# Patient Record
Sex: FEMALE | Race: WHITE | NOT HISPANIC OR LATINO | Employment: FULL TIME | URBAN - METROPOLITAN AREA
[De-identification: names, ages, dates, MRNs, and addresses within clinical notes are randomized per-mention and may not be internally consistent; named-entity substitution may affect disease eponyms.]

---

## 2017-02-18 ENCOUNTER — APPOINTMENT (OUTPATIENT)
Dept: LAB | Facility: HOSPITAL | Age: 22
End: 2017-02-18
Payer: COMMERCIAL

## 2017-02-18 ENCOUNTER — OFFICE VISIT (OUTPATIENT)
Dept: URGENT CARE | Facility: MEDICAL CENTER | Age: 22
End: 2017-02-18
Payer: COMMERCIAL

## 2017-02-18 DIAGNOSIS — R69 ILLNESS: ICD-10-CM

## 2017-02-18 PROCEDURE — 99203 OFFICE O/P NEW LOW 30 MIN: CPT

## 2017-02-18 PROCEDURE — 87798 DETECT AGENT NOS DNA AMP: CPT

## 2017-02-19 LAB
FLUAV AG SPEC QL: NORMAL
FLUBV AG SPEC QL: NORMAL
RSV B RNA SPEC QL NAA+PROBE: NORMAL

## 2018-07-12 ENCOUNTER — OFFICE VISIT (OUTPATIENT)
Dept: OBGYN CLINIC | Facility: MEDICAL CENTER | Age: 23
End: 2018-07-12
Payer: COMMERCIAL

## 2018-07-12 VITALS
BODY MASS INDEX: 22.99 KG/M2 | HEIGHT: 65 IN | DIASTOLIC BLOOD PRESSURE: 70 MMHG | SYSTOLIC BLOOD PRESSURE: 110 MMHG | WEIGHT: 138 LBS

## 2018-07-12 DIAGNOSIS — Z01.419 ENCOUNTER FOR GYNECOLOGICAL EXAMINATION WITHOUT ABNORMAL FINDING: Primary | ICD-10-CM

## 2018-07-12 DIAGNOSIS — Z12.4 PAP SMEAR FOR CERVICAL CANCER SCREENING: ICD-10-CM

## 2018-07-12 PROBLEM — J11.1 INFLUENZA-LIKE ILLNESS: Status: ACTIVE | Noted: 2017-02-18

## 2018-07-12 PROCEDURE — 99385 PREV VISIT NEW AGE 18-39: CPT | Performed by: NURSE PRACTITIONER

## 2018-07-12 PROCEDURE — G0145 SCR C/V CYTO,THINLAYER,RESCR: HCPCS | Performed by: NURSE PRACTITIONER

## 2018-07-12 NOTE — PROGRESS NOTES
[facility]  ANNUAL GYNECOLOGIC EXAM  Kiko Navarro 21 y o  SUBJECTIVE      HPI:  Kiko Navarro (pronounced "Ya Khan") is a 21 y o  [de-identified] female presenting today for annual GYN exam  Her last gynecologic exam was in  at our practice  Patient's last menstrual period was 2018  Periods are regular, Q30, x7 days  Sexually active with 1 Male partner, monogamous  Just got  on ! Declines STI testing today  Denies sexual concerns  Has no breast, bowel, bladder, or vaginal concerns  Works at [employer]  Gynecologic History   Last pap smear: Never  History of abnormal pap smears: N/A  The patient denies history of sexually transmitted disease  Contraceptive method: Condoms  Dyspareunia: denies      Obstetric History   0  Desires conception in the next year: Possibly    Health Maintenance  Self-breast exams: yes  Tobacco cessation: N/A      The following portions of the patient's history were reviewed and updated as appropriate: allergies, current medications, past family history, past medical history, past social history, past surgical history and problem list     At todays visit I discussed the importance of self-breast exams and awareness  We discussed the importance of exercise and healthy diet  I reviewed ASCCP guidelines for cervical cancer screening  Safe sexual practices were strongly encouraged to protect against sexually transmitted infections  We reviewed her reproductive life plan  All questions were answered to her apparent satisfaction       ROS  General ROS: negative for chills or fever  Breast ROS: negative for breast lumps  Respiratory ROS: no cough, shortness of breath, or wheezing  Cardiovascular ROS: no chest pain or dyspnea on exertion  Gastrointestinal ROS: no abdominal pain, change in bowel habits, or black or bloody stools  Genito-Urinary ROS: no dysuria, trouble voiding, or hematuria  Neurological ROS: negative      OBJECTIVE  Vitals:    07/12/18 1627   BP: 110/70   BP Location: Left arm   Patient Position: Sitting   Weight: 62 6 kg (138 lb)   Height: 5' 5" (1 651 m)       Physical Exam   Constitutional: She is oriented to person, place, and time  Vital signs are normal  She appears well-developed and well-nourished  Genitourinary: Vagina normal and uterus normal  Pelvic exam was performed with patient supine  There is no rash, tenderness or lesion on the right labia  There is no rash, tenderness or lesion on the left labia  No erythema in the vagina  No vaginal discharge found  Right adnexum does not display mass and does not display tenderness  Left adnexum does not display mass and does not display tenderness  Cervix does not exhibit motion tenderness  Uterus is not tender  HENT:   Head: Normocephalic and atraumatic  Pulmonary/Chest: Effort normal  Right breast exhibits no inverted nipple, no mass, no nipple discharge, no skin change and no tenderness  Left breast exhibits no inverted nipple, no mass, no nipple discharge, no skin change and no tenderness  Breasts are symmetrical    Abdominal: Soft  Normal appearance  Musculoskeletal: Normal range of motion  Lymphadenopathy:     She has no axillary adenopathy  Neurological: She is alert and oriented to person, place, and time  Skin: Skin is warm, dry and intact  Psychiatric: She has a normal mood and affect  Her behavior is normal    Vitals reviewed  ASSESSMENT  Normal exam      PLAN  1 - She is OK with condoms for now  Advised to start prenatal vitamin, they want kids soon but not sure when  2 - First pap smear with reflex HPV (if ASCUS) done today  3 - Return annually for routine GYN exams      All questions were answered & [place] expressed understanding        Rubio Leblanc

## 2018-07-20 LAB
LAB AP GYN PRIMARY INTERPRETATION: NORMAL
LAB AP LMP: NORMAL
Lab: NORMAL

## 2019-04-17 ENCOUNTER — TRANSCRIBE ORDERS (OUTPATIENT)
Dept: LAB | Facility: CLINIC | Age: 24
End: 2019-04-17

## 2019-04-17 ENCOUNTER — TELEPHONE (OUTPATIENT)
Dept: OBGYN CLINIC | Facility: CLINIC | Age: 24
End: 2019-04-17

## 2019-04-17 ENCOUNTER — APPOINTMENT (OUTPATIENT)
Dept: LAB | Facility: CLINIC | Age: 24
End: 2019-04-17
Payer: COMMERCIAL

## 2019-04-17 DIAGNOSIS — N30.00 ACUTE CYSTITIS WITHOUT HEMATURIA: Primary | ICD-10-CM

## 2019-04-17 DIAGNOSIS — N30.00 ACUTE CYSTITIS WITHOUT HEMATURIA: ICD-10-CM

## 2019-04-17 LAB
BACTERIA UR QL AUTO: ABNORMAL /HPF
BILIRUB UR QL STRIP: NEGATIVE
CLARITY UR: ABNORMAL
COLOR UR: YELLOW
GLUCOSE UR STRIP-MCNC: NEGATIVE MG/DL
HGB UR QL STRIP.AUTO: ABNORMAL
KETONES UR STRIP-MCNC: NEGATIVE MG/DL
LEUKOCYTE ESTERASE UR QL STRIP: ABNORMAL
NITRITE UR QL STRIP: NEGATIVE
NON-SQ EPI CELLS URNS QL MICRO: ABNORMAL /HPF
PH UR STRIP.AUTO: 6 [PH]
PROT UR STRIP-MCNC: NEGATIVE MG/DL
RBC #/AREA URNS AUTO: ABNORMAL /HPF
SP GR UR STRIP.AUTO: 1.02 (ref 1–1.03)
UROBILINOGEN UR QL STRIP.AUTO: 0.2 E.U./DL
WBC #/AREA URNS AUTO: ABNORMAL /HPF

## 2019-04-17 PROCEDURE — 87086 URINE CULTURE/COLONY COUNT: CPT

## 2019-04-17 PROCEDURE — 87186 SC STD MICRODIL/AGAR DIL: CPT

## 2019-04-17 PROCEDURE — 81001 URINALYSIS AUTO W/SCOPE: CPT

## 2019-04-17 PROCEDURE — 87077 CULTURE AEROBIC IDENTIFY: CPT

## 2019-04-17 RX ORDER — NITROFURANTOIN 25; 75 MG/1; MG/1
100 CAPSULE ORAL 2 TIMES DAILY
Qty: 6 CAPSULE | Refills: 0 | Status: SHIPPED | OUTPATIENT
Start: 2019-04-17 | End: 2019-04-20

## 2019-04-19 ENCOUNTER — TELEPHONE (OUTPATIENT)
Dept: OBGYN CLINIC | Facility: CLINIC | Age: 24
End: 2019-04-19

## 2019-04-19 LAB
BACTERIA UR CULT: ABNORMAL
BACTERIA UR CULT: ABNORMAL

## 2019-09-03 ENCOUNTER — OFFICE VISIT (OUTPATIENT)
Dept: URGENT CARE | Facility: MEDICAL CENTER | Age: 24
End: 2019-09-03
Payer: COMMERCIAL

## 2019-09-03 VITALS
TEMPERATURE: 98.6 F | DIASTOLIC BLOOD PRESSURE: 72 MMHG | RESPIRATION RATE: 20 BRPM | SYSTOLIC BLOOD PRESSURE: 105 MMHG | HEART RATE: 100 BPM | HEIGHT: 65 IN | WEIGHT: 164.38 LBS | OXYGEN SATURATION: 76 % | BODY MASS INDEX: 27.39 KG/M2

## 2019-09-03 DIAGNOSIS — J02.9 SORE THROAT: Primary | ICD-10-CM

## 2019-09-03 LAB — S PYO AG THROAT QL: NEGATIVE

## 2019-09-03 PROCEDURE — 99213 OFFICE O/P EST LOW 20 MIN: CPT | Performed by: PHYSICIAN ASSISTANT

## 2019-09-03 PROCEDURE — 87070 CULTURE OTHR SPECIMN AEROBIC: CPT | Performed by: PHYSICIAN ASSISTANT

## 2019-09-03 PROCEDURE — 87880 STREP A ASSAY W/OPTIC: CPT | Performed by: PHYSICIAN ASSISTANT

## 2019-09-03 NOTE — PATIENT INSTRUCTIONS
Please use Tylenol or Motrin for pain   may continue to use warm salt water gargles and drink hot fluids   follow-up with PCP if symptoms do not improve  Report to ER if symptoms worsen    Pharyngitis   WHAT YOU NEED TO KNOW:   Pharyngitis, or sore throat, is inflammation of the tissues and structures in your pharynx (throat)  Pharyngitis is most often caused by bacteria  It may also be caused by a cold or flu virus  Other causes include smoking, allergies, or acid reflux  DISCHARGE INSTRUCTIONS:   Call 911 for any of the following:   · You have trouble breathing or swallowing because your throat is swollen or sore  Return to the emergency department if:   · You are drooling because it hurts too much to swallow  · Your fever is higher than 102? F (39?C) or lasts longer than 3 days  · You are confused  · You taste blood in your throat  Contact your healthcare provider if:   · Your throat pain gets worse  · You have a painful lump in your throat that does not go away after 5 days  · Your symptoms do not improve after 5 days  · You have questions or concerns about your condition or care  Medicines:  Viral pharyngitis will go away on its own without treatment  Your sore throat should start to feel better in 3 to 5 days for both viral and bacterial infections  You may need any of the following:  · Antibiotics  treat a bacterial infection  · NSAIDs , such as ibuprofen, help decrease swelling, pain, and fever  NSAIDs can cause stomach bleeding or kidney problems in certain people  If you take blood thinner medicine, always ask your healthcare provider if NSAIDs are safe for you  Always read the medicine label and follow directions  · Acetaminophen  decreases pain and fever  It is available without a doctor's order  Ask how much to take and how often to take it  Follow directions  Acetaminophen can cause liver damage if not taken correctly  · Take your medicine as directed    Contact your healthcare provider if you think your medicine is not helping or if you have side effects  Tell him or her if you are allergic to any medicine  Keep a list of the medicines, vitamins, and herbs you take  Include the amounts, and when and why you take them  Bring the list or the pill bottles to follow-up visits  Carry your medicine list with you in case of an emergency  Manage your symptoms:   · Gargle salt water  Mix ¼ teaspoon salt in an 8 ounce glass of warm water and gargle  This may help decrease swelling in your throat  · Drink liquids as directed  You may need to drink more liquids than usual  Liquids may help soothe your throat and prevent dehydration  Ask how much liquid to drink each day and which liquids are best for you  · Use a cool-steam humidifier  to help moisten the air in your room and calm your cough  · Soothe your throat  with cough drops, ice, soft foods, or popsicles  Prevent the spread of pharyngitis:  Cover your mouth and nose when you cough or sneeze  Do not share food or drinks  Wash your hands often  Use soap and water  If soap and water are unavailable, use an alcohol based hand   Follow up with your healthcare provider as directed:  Write down your questions so you remember to ask them during your visits  © 2017 [address] Information is for End User's use only and may not be sold, redistributed or otherwise used for commercial purposes  All illustrations and images included in CareNotes® are the copyrighted property of A D A M , Inc  or Nicholas Owens  The above information is an  only  It is not intended as medical advice for individual conditions or treatments  Talk to your doctor, nurse or pharmacist before following any medical regimen to see if it is safe and effective for you

## 2019-09-05 LAB — BACTERIA THROAT CULT: NORMAL

## 2021-06-21 ENCOUNTER — ANNUAL EXAM (OUTPATIENT)
Dept: OBGYN CLINIC | Facility: MEDICAL CENTER | Age: 26
End: 2021-06-21
Payer: COMMERCIAL

## 2021-06-21 VITALS — DIASTOLIC BLOOD PRESSURE: 62 MMHG | WEIGHT: 178 LBS | BODY MASS INDEX: 29.62 KG/M2 | SYSTOLIC BLOOD PRESSURE: 96 MMHG

## 2021-06-21 DIAGNOSIS — Z01.419 ENCOUNTER FOR GYNECOLOGICAL EXAMINATION (GENERAL) (ROUTINE) WITHOUT ABNORMAL FINDINGS: Primary | ICD-10-CM

## 2021-06-21 PROCEDURE — S0612 ANNUAL GYNECOLOGICAL EXAMINA: HCPCS | Performed by: NURSE PRACTITIONER

## 2021-06-21 PROCEDURE — G0145 SCR C/V CYTO,THINLAYER,RESCR: HCPCS | Performed by: NURSE PRACTITIONER

## 2021-06-21 NOTE — ASSESSMENT & PLAN NOTE
Normal findings on routine annual gyn exam  Recommended monthly SBE, annual CBE  Reviewed ASCCP guidelines and pap collected today  The patient reports she has NOT completed Gardasil series-declines at this time  STI testing was offered and declined at this time; the patient is aware that condoms are recommended for all sexual contact for prevention of STI  Reviewed diet/activity recommendations and reasons to call  F/u in one year for routine annual gyn exam or sooner PRN

## 2021-06-21 NOTE — PROGRESS NOTES
Encounter for gynecological examination (general) (routine) without abnormal findings  Normal findings on routine annual gyn exam  Recommended monthly SBE, annual CBE  Reviewed ASCCP guidelines and pap collected today  The patient reports she has NOT completed Gardasil series-declines at this time  STI testing was offered and declined at this time; the patient is aware that condoms are recommended for all sexual contact for prevention of STI  Reviewed diet/activity recommendations and reasons to call  F/u in one year for routine annual gyn exam or sooner PRN  Diagnoses and all orders for this visit:    Encounter for gynecological examination (general) (routine) without abnormal findings  -     Liquid-based pap, screening         Nathaly Grayson  1995    CC:  Yearly exam    S:  Nathaly Grayson is a 32 y o  female here for yearly exam  She denies breast concerns, abdominal/pelvic pain, abnormal vaginal discharge or bladder/bowel dysfunction  Her cycles are regular, not heavy or painful  Sexual activity: She is sexually active without pain, bleeding or dryness  Contraception:  She uses nothing  for contraception  They are attempting pregnancy in the next few months  Advised PN vitamin with DHA to start now  Discussed timing and frequency of intercourse  STD testing:  She does not request STD testing today  Gardasil:  She has had the Gardasil series  Last Pap:7/18 negative     Family hx of breast cancer: denies  Family hx of ovarian cancer: denies  Family hx of colon cancer: denies     She works for [employer]    No current outpatient medications on file    Social History     Socioeconomic History    Marital status: /Civil Union     Spouse name: Not on file    Number of children: Not on file    Years of education: Not on file    Highest education level: Not on file   Occupational History    Not on file   Tobacco Use    Smoking status: Current Every Day Smoker  Smokeless tobacco: Never Used   Substance and Sexual Activity    Alcohol use: Not Currently    Drug use: No    Sexual activity: Yes     Partners: Male   Other Topics Concern    Not on file   Social History Narrative    Not on file     Social Determinants of Health     Financial Resource Strain:     Difficulty of Paying Living Expenses:    Food Insecurity:     Worried About Running Out of Food in the Last Year:     [address] in the Last Year:    Transportation Needs:     Lack of Transportation (Medical):  Lack of Transportation (Non-Medical):    Physical Activity:     Days of Exercise per Week:     Minutes of Exercise per Session:    Stress:     Feeling of Stress :    Social Connections:     Frequency of Communication with Friends and Family:     Frequency of Social Gatherings with Friends and Family:     Attends Congregation Services:     Active Member of Clubs or Organizations:     Attends Club or Organization Meetings:     Marital Status:    Intimate Partner Violence:     Fear of Current or Ex-Partner:     Emotionally Abused:     Physically Abused:     Sexually Abused:      Family History   Problem Relation Age of Onset    No Known Problems Mother     Diabetes Father     Bone cancer Paternal Grandfather      Past Medical History:   Diagnosis Date    Mononucleosis     Pleurisy      Review of Systems   Constitutional: Negative for appetite change, fatigue and unexpected weight change  Respiratory: Negative for shortness of breath  Cardiovascular: Negative for chest pain and leg swelling  Breasts:  negative for tenderness or masses  Gastrointestinal: Negative for abdominal pain  Endocrine: Negative for cold intolerance and heat intolerance  Genitourinary: Negative for dyspareunia, dysuria, flank pain, frequency, genital sores, hematuria, menstrual problem and pelvic pain  Musculoskeletal: Negative for back pain  Neurological: Negative for headaches       O:  Blood pressure 96/62, weight 80 7 kg (178 lb), last menstrual period 05/28/2021, not currently breastfeeding  Patient appears well and is not in distress  ROM normal   Neck is supple without masses  Normal thyroid  Heart regular rate and rhythm  Lungs CTA bilaterally   Breasts are symmetrical without mass, tenderness, nipple discharge, skin changes or adenopathy  Abdomen is soft and nontender without masses  External genitals are normal without lesions or rashes  Urethral meatus and urethra are normal  Bladder is normal to palpation  Vagina is normal without discharge or bleeding  Cervix is normal without discharge or lesion  Uterus is normal, mobile, nontender without palpable mass  Adnexa are normal, nontender, without palpable mass     Skin warm and dry  Capillary refill < 2 seconds  Alert and oriented x 3 with normal affect

## 2021-06-25 LAB
LAB AP GYN PRIMARY INTERPRETATION: NORMAL
Lab: NORMAL

## 2021-06-28 ENCOUNTER — TELEPHONE (OUTPATIENT)
Dept: OBGYN CLINIC | Facility: CLINIC | Age: 26
End: 2021-06-28

## 2021-10-26 ENCOUNTER — ULTRASOUND (OUTPATIENT)
Dept: OBGYN CLINIC | Facility: MEDICAL CENTER | Age: 26
End: 2021-10-26
Payer: COMMERCIAL

## 2021-10-26 VITALS
WEIGHT: 189 LBS | BODY MASS INDEX: 31.49 KG/M2 | DIASTOLIC BLOOD PRESSURE: 76 MMHG | HEIGHT: 65 IN | SYSTOLIC BLOOD PRESSURE: 100 MMHG

## 2021-10-26 DIAGNOSIS — N91.1 SECONDARY AMENORRHEA: Primary | ICD-10-CM

## 2021-10-26 PROCEDURE — 76817 TRANSVAGINAL US OBSTETRIC: CPT | Performed by: NURSE PRACTITIONER

## 2021-10-26 PROCEDURE — 99213 OFFICE O/P EST LOW 20 MIN: CPT | Performed by: NURSE PRACTITIONER

## 2021-11-03 ENCOUNTER — TELEPHONE (OUTPATIENT)
Dept: OBGYN CLINIC | Facility: CLINIC | Age: 26
End: 2021-11-03

## 2021-11-05 ENCOUNTER — TELEMEDICINE (OUTPATIENT)
Dept: OBGYN CLINIC | Facility: CLINIC | Age: 26
End: 2021-11-05

## 2021-11-05 VITALS — WEIGHT: 189 LBS | HEIGHT: 65 IN | BODY MASS INDEX: 31.49 KG/M2

## 2021-11-05 DIAGNOSIS — Z34.01 ENCOUNTER FOR SUPERVISION OF NORMAL FIRST PREGNANCY IN FIRST TRIMESTER: Primary | ICD-10-CM

## 2021-11-05 PROCEDURE — OBC: Performed by: NURSE PRACTITIONER

## 2021-11-08 ENCOUNTER — APPOINTMENT (OUTPATIENT)
Dept: LAB | Facility: CLINIC | Age: 26
End: 2021-11-08
Payer: COMMERCIAL

## 2021-11-08 DIAGNOSIS — Z34.01 ENCOUNTER FOR SUPERVISION OF NORMAL FIRST PREGNANCY IN FIRST TRIMESTER: ICD-10-CM

## 2021-11-08 LAB
ABO GROUP BLD: NORMAL
BACTERIA UR QL AUTO: NORMAL /HPF
BASOPHILS # BLD AUTO: 0.03 THOUSANDS/ΜL (ref 0–0.1)
BASOPHILS NFR BLD AUTO: 1 % (ref 0–1)
BILIRUB UR QL STRIP: NEGATIVE
BLD GP AB SCN SERPL QL: NEGATIVE
CLARITY UR: CLEAR
COLOR UR: YELLOW
EOSINOPHIL # BLD AUTO: 0.06 THOUSAND/ΜL (ref 0–0.61)
EOSINOPHIL NFR BLD AUTO: 1 % (ref 0–6)
ERYTHROCYTE [DISTWIDTH] IN BLOOD BY AUTOMATED COUNT: 12.9 % (ref 11.6–15.1)
GLUCOSE UR STRIP-MCNC: NEGATIVE MG/DL
HBV SURFACE AG SER QL: NORMAL
HCT VFR BLD AUTO: 41.1 % (ref 34.8–46.1)
HCV AB SER QL: NORMAL
HGB BLD-MCNC: 14 G/DL (ref 11.5–15.4)
HGB UR QL STRIP.AUTO: NEGATIVE
HYALINE CASTS #/AREA URNS LPF: NORMAL /LPF
IMM GRANULOCYTES # BLD AUTO: 0.01 THOUSAND/UL (ref 0–0.2)
IMM GRANULOCYTES NFR BLD AUTO: 0 % (ref 0–2)
KETONES UR STRIP-MCNC: NEGATIVE MG/DL
LEUKOCYTE ESTERASE UR QL STRIP: NEGATIVE
LYMPHOCYTES # BLD AUTO: 1.64 THOUSANDS/ΜL (ref 0.6–4.47)
LYMPHOCYTES NFR BLD AUTO: 37 % (ref 14–44)
MCH RBC QN AUTO: 32.3 PG (ref 26.8–34.3)
MCHC RBC AUTO-ENTMCNC: 34.1 G/DL (ref 31.4–37.4)
MCV RBC AUTO: 95 FL (ref 82–98)
MONOCYTES # BLD AUTO: 0.42 THOUSAND/ΜL (ref 0.17–1.22)
MONOCYTES NFR BLD AUTO: 10 % (ref 4–12)
NEUTROPHILS # BLD AUTO: 2.26 THOUSANDS/ΜL (ref 1.85–7.62)
NEUTS SEG NFR BLD AUTO: 51 % (ref 43–75)
NITRITE UR QL STRIP: NEGATIVE
NON-SQ EPI CELLS URNS QL MICRO: NORMAL /HPF
NRBC BLD AUTO-RTO: 0 /100 WBCS
PH UR STRIP.AUTO: 7.5 [PH]
PLATELET # BLD AUTO: 197 THOUSANDS/UL (ref 149–390)
PMV BLD AUTO: 12.8 FL (ref 8.9–12.7)
PROT UR STRIP-MCNC: NEGATIVE MG/DL
RBC # BLD AUTO: 4.33 MILLION/UL (ref 3.81–5.12)
RBC #/AREA URNS AUTO: NORMAL /HPF
RH BLD: POSITIVE
RUBV IGG SERPL IA-ACNC: 49.3 IU/ML
SP GR UR STRIP.AUTO: 1.01 (ref 1–1.03)
SPECIMEN EXPIRATION DATE: NORMAL
UROBILINOGEN UR QL STRIP.AUTO: 0.2 E.U./DL
WBC # BLD AUTO: 4.42 THOUSAND/UL (ref 4.31–10.16)
WBC #/AREA URNS AUTO: NORMAL /HPF

## 2021-11-08 PROCEDURE — 87086 URINE CULTURE/COLONY COUNT: CPT

## 2021-11-08 PROCEDURE — 81001 URINALYSIS AUTO W/SCOPE: CPT

## 2021-11-08 PROCEDURE — 36415 COLL VENOUS BLD VENIPUNCTURE: CPT

## 2021-11-08 PROCEDURE — 86803 HEPATITIS C AB TEST: CPT

## 2021-11-08 PROCEDURE — 80081 OBSTETRIC PANEL INC HIV TSTG: CPT

## 2021-11-09 ENCOUNTER — TELEPHONE (OUTPATIENT)
Dept: PERINATAL CARE | Facility: CLINIC | Age: 26
End: 2021-11-09

## 2021-11-09 ENCOUNTER — TELEPHONE (OUTPATIENT)
Dept: PERINATAL CARE | Facility: OTHER | Age: 26
End: 2021-11-09

## 2021-11-09 LAB
BACTERIA UR CULT: NORMAL
HIV 1+2 AB+HIV1 P24 AG SERPL QL IA: NORMAL
RPR SER QL: NORMAL

## 2021-11-18 ENCOUNTER — INITIAL PRENATAL (OUTPATIENT)
Dept: OBGYN CLINIC | Facility: MEDICAL CENTER | Age: 26
End: 2021-11-18
Payer: COMMERCIAL

## 2021-11-18 ENCOUNTER — ROUTINE PRENATAL (OUTPATIENT)
Dept: PERINATAL CARE | Facility: OTHER | Age: 26
End: 2021-11-18
Payer: COMMERCIAL

## 2021-11-18 VITALS
DIASTOLIC BLOOD PRESSURE: 63 MMHG | HEIGHT: 65 IN | WEIGHT: 186 LBS | SYSTOLIC BLOOD PRESSURE: 103 MMHG | HEART RATE: 78 BPM | BODY MASS INDEX: 30.99 KG/M2

## 2021-11-18 VITALS — BODY MASS INDEX: 30.95 KG/M2 | SYSTOLIC BLOOD PRESSURE: 112 MMHG | WEIGHT: 186 LBS | DIASTOLIC BLOOD PRESSURE: 82 MMHG

## 2021-11-18 DIAGNOSIS — Z3A.12 12 WEEKS GESTATION OF PREGNANCY: ICD-10-CM

## 2021-11-18 DIAGNOSIS — O99.210 OBESITY IN PREGNANCY: ICD-10-CM

## 2021-11-18 DIAGNOSIS — Z11.3 SCREENING FOR STD (SEXUALLY TRANSMITTED DISEASE): ICD-10-CM

## 2021-11-18 DIAGNOSIS — Z23 NEED FOR INFLUENZA VACCINATION: ICD-10-CM

## 2021-11-18 DIAGNOSIS — Z34.01 ENCOUNTER FOR SUPERVISION OF NORMAL FIRST PREGNANCY IN FIRST TRIMESTER: Primary | ICD-10-CM

## 2021-11-18 DIAGNOSIS — Z87.898 HISTORY OF SYNCOPE: ICD-10-CM

## 2021-11-18 DIAGNOSIS — Z34.01 ENCOUNTER FOR SUPERVISION OF NORMAL FIRST PREGNANCY IN FIRST TRIMESTER: ICD-10-CM

## 2021-11-18 DIAGNOSIS — Z36.82 ENCOUNTER FOR NUCHAL TRANSLUCENCY TESTING: Primary | ICD-10-CM

## 2021-11-18 PROBLEM — J11.1 INFLUENZA-LIKE ILLNESS: Status: RESOLVED | Noted: 2017-02-18 | Resolved: 2021-11-18

## 2021-11-18 PROBLEM — N91.1 SECONDARY AMENORRHEA: Status: RESOLVED | Noted: 2021-10-26 | Resolved: 2021-11-18

## 2021-11-18 PROBLEM — Z01.419 ENCOUNTER FOR GYNECOLOGICAL EXAMINATION (GENERAL) (ROUTINE) WITHOUT ABNORMAL FINDINGS: Status: RESOLVED | Noted: 2021-06-21 | Resolved: 2021-11-18

## 2021-11-18 LAB
SL AMB  POCT GLUCOSE, UA: NORMAL
SL AMB POCT URINE PROTEIN: NORMAL

## 2021-11-18 PROCEDURE — 99241 PR OFFICE CONSULTATION NEW/ESTAB PATIENT 15 MIN: CPT | Performed by: OBSTETRICS & GYNECOLOGY

## 2021-11-18 PROCEDURE — 76813 OB US NUCHAL MEAS 1 GEST: CPT | Performed by: OBSTETRICS & GYNECOLOGY

## 2021-11-18 PROCEDURE — PNV: Performed by: PHYSICIAN ASSISTANT

## 2021-11-18 PROCEDURE — 87591 N.GONORRHOEAE DNA AMP PROB: CPT | Performed by: PHYSICIAN ASSISTANT

## 2021-11-18 PROCEDURE — 87491 CHLMYD TRACH DNA AMP PROBE: CPT | Performed by: PHYSICIAN ASSISTANT

## 2021-11-18 PROCEDURE — 90686 IIV4 VACC NO PRSV 0.5 ML IM: CPT | Performed by: PHYSICIAN ASSISTANT

## 2021-11-18 PROCEDURE — 90471 IMMUNIZATION ADMIN: CPT | Performed by: PHYSICIAN ASSISTANT

## 2021-11-30 ENCOUNTER — TELEPHONE (OUTPATIENT)
Dept: OBGYN CLINIC | Facility: CLINIC | Age: 26
End: 2021-11-30

## 2021-12-03 LAB
C TRACH DNA SPEC QL NAA+PROBE: NEGATIVE
N GONORRHOEA DNA SPEC QL NAA+PROBE: NEGATIVE

## 2021-12-07 ENCOUNTER — TELEPHONE (OUTPATIENT)
Dept: PERINATAL CARE | Facility: CLINIC | Age: 26
End: 2021-12-07

## 2021-12-14 ENCOUNTER — ROUTINE PRENATAL (OUTPATIENT)
Dept: OBGYN CLINIC | Facility: MEDICAL CENTER | Age: 26
End: 2021-12-14

## 2021-12-14 VITALS — SYSTOLIC BLOOD PRESSURE: 116 MMHG | WEIGHT: 190.2 LBS | DIASTOLIC BLOOD PRESSURE: 66 MMHG | BODY MASS INDEX: 31.65 KG/M2

## 2021-12-14 DIAGNOSIS — Z34.02 ENCOUNTER FOR SUPERVISION OF NORMAL FIRST PREGNANCY IN SECOND TRIMESTER: ICD-10-CM

## 2021-12-14 DIAGNOSIS — O99.210 OBESITY IN PREGNANCY: ICD-10-CM

## 2021-12-14 DIAGNOSIS — Z34.01 ENCOUNTER FOR SUPERVISION OF NORMAL FIRST PREGNANCY IN FIRST TRIMESTER: Primary | ICD-10-CM

## 2021-12-14 LAB
SL AMB  POCT GLUCOSE, UA: NORMAL
SL AMB POCT URINE PROTEIN: NORMAL

## 2021-12-14 PROCEDURE — PNV: Performed by: NURSE PRACTITIONER

## 2021-12-27 ENCOUNTER — TELEPHONE (OUTPATIENT)
Dept: OBGYN CLINIC | Facility: CLINIC | Age: 26
End: 2021-12-27

## 2022-01-06 ENCOUNTER — ROUTINE PRENATAL (OUTPATIENT)
Dept: PERINATAL CARE | Facility: OTHER | Age: 27
End: 2022-01-06
Payer: COMMERCIAL

## 2022-01-06 VITALS
WEIGHT: 192.2 LBS | DIASTOLIC BLOOD PRESSURE: 72 MMHG | BODY MASS INDEX: 32.02 KG/M2 | SYSTOLIC BLOOD PRESSURE: 108 MMHG | HEIGHT: 65 IN | HEART RATE: 78 BPM

## 2022-01-06 DIAGNOSIS — Z3A.20 20 WEEKS GESTATION OF PREGNANCY: ICD-10-CM

## 2022-01-06 DIAGNOSIS — Z36.86 ENCOUNTER FOR ANTENATAL SCREENING FOR CERVICAL LENGTH: ICD-10-CM

## 2022-01-06 DIAGNOSIS — O99.210 OBESITY IN PREGNANCY: Primary | ICD-10-CM

## 2022-01-06 PROCEDURE — 76811 OB US DETAILED SNGL FETUS: CPT | Performed by: OBSTETRICS & GYNECOLOGY

## 2022-01-06 PROCEDURE — 99213 OFFICE O/P EST LOW 20 MIN: CPT | Performed by: OBSTETRICS & GYNECOLOGY

## 2022-01-06 PROCEDURE — 76817 TRANSVAGINAL US OBSTETRIC: CPT | Performed by: OBSTETRICS & GYNECOLOGY

## 2022-01-06 RX ORDER — DEXAMETHASONE 6 MG/1
6 TABLET ORAL DAILY
COMMUNITY
Start: 2021-12-31 | End: 2022-01-18

## 2022-01-06 RX ORDER — AZITHROMYCIN 500 MG/1
500 TABLET, FILM COATED ORAL DAILY
COMMUNITY
Start: 2021-12-31 | End: 2022-01-18

## 2022-01-11 PROBLEM — Z3A.20 20 WEEKS GESTATION OF PREGNANCY: Status: ACTIVE | Noted: 2022-01-11

## 2022-01-11 NOTE — PROGRESS NOTES
The patient was seen today for an ultrasound  Please see ultrasound report (located under Ob Procedures) for additional details  Thank you very much for allowing us to participate in the care of this very nice patient  Should you have any questions, please do not hesitate to contact me  Amor Bates MD [facility]  Attending Physician, [place]

## 2022-01-18 ENCOUNTER — ROUTINE PRENATAL (OUTPATIENT)
Dept: OBGYN CLINIC | Facility: MEDICAL CENTER | Age: 27
End: 2022-01-18

## 2022-01-18 VITALS — SYSTOLIC BLOOD PRESSURE: 110 MMHG | BODY MASS INDEX: 32.98 KG/M2 | DIASTOLIC BLOOD PRESSURE: 72 MMHG | WEIGHT: 198.2 LBS

## 2022-01-18 DIAGNOSIS — O98.512 COVID-19 AFFECTING PREGNANCY IN SECOND TRIMESTER: ICD-10-CM

## 2022-01-18 DIAGNOSIS — O99.210 OBESITY IN PREGNANCY: ICD-10-CM

## 2022-01-18 DIAGNOSIS — Z3A.21 21 WEEKS GESTATION OF PREGNANCY: ICD-10-CM

## 2022-01-18 DIAGNOSIS — U07.1 COVID-19 AFFECTING PREGNANCY IN SECOND TRIMESTER: ICD-10-CM

## 2022-01-18 DIAGNOSIS — Z34.92 PRENATAL CARE IN SECOND TRIMESTER: Primary | ICD-10-CM

## 2022-01-18 LAB
SL AMB  POCT GLUCOSE, UA: NORMAL
SL AMB POCT URINE PROTEIN: NORMAL

## 2022-01-18 PROCEDURE — PNV: Performed by: NURSE PRACTITIONER

## 2022-01-18 NOTE — PROGRESS NOTES
Prenatal care in second trimester    Jong Brown  is a 32 y o   @21w4d who presents for routine prenatal visit  Denies OB complaints  No fetal movement yet-anterior placenta  Denies contractions, cramping, leakage of fluid or vaginal bleeding  Declines COVID vaccine  1/6 normal anatomy scan  Having a girl  Declines all genetic testing and MSAFP  For growth @ 32 weeks  S/p flu vaccine  Reviewed reasons to call  Obesity in pregnancy  Walking encouraged  TWG=9#    COVID-19 affecting pregnancy in second trimester  + COVID test @ 18 weeks  Third trimester growth scan    Added to list    21 weeks gestation of pregnancy  For routine PN visit

## 2022-01-18 NOTE — PROGRESS NOTES
Patient here for PN visit  She denies any complaints  US done 1/6  It's a girl! She does not feel movement yet  Urine neg for protein and glucose  She wants to try to breast feed

## 2022-01-18 NOTE — PATIENT INSTRUCTIONS
Pregnancy at [address] to [address]:   What changes are happening with my body? Now that you are in your second trimester, you have more energy  You may also be feeling hungrier than usual  You may be gaining about ½ to 1 pound a week, and your pregnancy is beginning to show  You may need to start wearing maternity clothes  As your baby gets larger, you may have other symptoms  These may include body aches or stretch marks on your abdomen, breasts, thighs, or buttocks  How do I care for myself at this stage of my pregnancy? · Eat a variety of healthy foods  Healthy foods include fruits, vegetables, whole-grain breads, low-fat dairy foods, beans, lean meats, and fish  Drink liquids as directed  Ask how much liquid to drink each day and which liquids are best for you  Limit caffeine to less than 200 milligrams each day  Limit your intake of fish to 2 servings each week  Choose fish low in mercury such as canned light tuna, shrimp, salmon, cod, or tilapia  Do not  eat fish high in mercury such as swordfish, tilefish, tamiko mackerel, and shark  · Take prenatal vitamins as directed  Your need for certain vitamins and minerals, such as folic acid, increases during pregnancy  Prenatal vitamins provide some of the extra vitamins and minerals you need  Prenatal vitamins may also help to decrease the risk of certain birth defects  · Talk to your healthcare provider about exercise  Moderate exercise can help you stay fit  Your healthcare provider will help you plan an exercise program that is safe for you during pregnancy  · Do not smoke  Smoking increases your risk of a miscarriage and other health problems during your pregnancy  Smoking can cause your baby to be born too early or weigh less at birth  Ask your healthcare provider for information if you need help quitting  · Do not drink alcohol  Alcohol passes from your body to your baby through the placenta   It can affect your baby's brain development and cause fetal alcohol syndrome (FAS)  FAS is a group of conditions that causes mental, behavior, and growth problems  · Talk to your healthcare provider before you take any medicines  Many medicines may harm your baby if you take them when you are pregnant  Do not take any medicines, vitamins, herbs, or supplements without first talking to your healthcare provider  Never use illegal or street drugs (such as marijuana or cocaine) while you are pregnant  What are some safety tips during pregnancy? · Avoid hot tubs and saunas  Do not use a hot tub or sauna while you are pregnant, especially during your first trimester  Hot tubs and saunas may raise your baby's temperature and increase the risk of birth defects  · Avoid toxoplasmosis  This is an infection caused by eating raw meat or being around infected cat feces  It can cause birth defects, miscarriages, and other problems  Wash your hands after you touch raw meat  Make sure any meat is well-cooked before you eat it  Avoid raw eggs and unpasteurized milk  Use gloves or ask someone else to clean your cat's litter box while you are pregnant  What changes are happening with my baby? By 22 weeks, your baby is about 8 inches long from the top of the head to the rump (baby's bottom)  Your baby also weighs about 1 pound  Your baby is becoming much more active  You may be able to feel the baby move inside you now  The first movements may not be that noticeable  They may feel like a fluttering sensation  As time goes on, your baby's movements will become stronger and more noticeable  What do I need to know about prenatal care? During the first 28 weeks of your pregnancy, you will see your healthcare provider once a month  Your healthcare provider will check your blood pressure and weight  You may also need the following:  · A urine test  may also be done to check for sugar and protein   These can be signs of gestational diabetes or infection  Protein in your urine may also be a sign of preeclampsia  Preeclampsia is a condition that can develop during week 20 or later of your pregnancy  It causes high blood pressure, and it can cause problems with your kidneys and other organs  · Fundal height  is a measurement of your uterus to check your baby's growth  This number is usually the same as the number of weeks that you have been pregnant  · A fetal ultrasound  shows pictures of your baby inside your uterus  It shows your baby's development  The movement and position of your baby can also be seen  Your healthcare provider may be able to tell you what your baby's gender is during the ultrasound  · Your baby's heart rate  will be checked  When should I seek immediate care? · You develop a severe headache that does not go away  · You have new or increased vision changes, such as blurred or spotted vision  · You have new or increased swelling in your face or hands  · You have vaginal spotting or bleeding  · Your water broke or you feel warm water gushing or trickling from your vagina  When should I call my doctor or obstetrician? · You have abdominal cramps, pressure, or tightening  · You have a change in vaginal discharge  · You cannot keep food or drinks down, and you are losing weight  · You have chills or a fever  · You have vaginal itching, burning, or pain  · You have yellow, green, white, or foul-smelling vaginal discharge  · You have pain or burning when you urinate, less urine than usual, or pink or bloody urine  · You have questions or concerns about your condition or care  CARE AGREEMENT:   You have the right to help plan your care  Learn about your health condition and how it may be treated  Discuss treatment options with your healthcare providers to decide what care you want to receive  You always have the right to refuse treatment   The above information is an  only  It is not intended as medical advice for individual conditions or treatments  Talk to your doctor, nurse or pharmacist before following any medical regimen to see if it is safe and effective for you  © Copyright [facility] 2021 Information is for End User's use only and may not be sold, redistributed or otherwise used for commercial purposes   All illustrations and images included in CareNotes® are the copyrighted property of [employer] , Inc  or [address] [place] [address]

## 2022-01-18 NOTE — ASSESSMENT & PLAN NOTE
Hattie Tuttlekathy  is a 32 y o  Luis Eduardomie Keisha @21w4d who presents for routine prenatal visit  Denies OB complaints  No fetal movement yet-anterior placenta  Denies contractions, cramping, leakage of fluid or vaginal bleeding  Declines COVID vaccine  1/6 normal anatomy scan  Having a girl  Declines all genetic testing and MSAFP  For growth @ 32 weeks  S/p flu vaccine  Reviewed reasons to call

## 2022-01-21 ENCOUNTER — TELEPHONE (OUTPATIENT)
Dept: PERINATAL CARE | Facility: OTHER | Age: 27
End: 2022-01-21

## 2022-01-21 NOTE — TELEPHONE ENCOUNTER
Left patient a message that her MFM appointment had to be rescheduled  The new time, date and location were provided - 1/28/22  3:15  [place]  The patient has been instructed to please call us back at [phone] with any questions or concerns

## 2022-01-26 ENCOUNTER — TELEPHONE (OUTPATIENT)
Dept: PERINATAL CARE | Facility: OTHER | Age: 27
End: 2022-01-26

## 2022-01-26 NOTE — TELEPHONE ENCOUNTER
Left message for pt stating that I was canceling her ultrasound appt on this Friday 1/28 in [place], She did have her level 2 on 1/6 and needs to make a 12w followup for growth around 4/20

## 2022-02-14 ENCOUNTER — ROUTINE PRENATAL (OUTPATIENT)
Dept: OBGYN CLINIC | Facility: MEDICAL CENTER | Age: 27
End: 2022-02-14
Payer: COMMERCIAL

## 2022-02-14 VITALS — WEIGHT: 203.8 LBS | SYSTOLIC BLOOD PRESSURE: 100 MMHG | DIASTOLIC BLOOD PRESSURE: 72 MMHG | BODY MASS INDEX: 33.91 KG/M2

## 2022-02-14 DIAGNOSIS — O98.512 COVID-19 AFFECTING PREGNANCY IN SECOND TRIMESTER: ICD-10-CM

## 2022-02-14 DIAGNOSIS — M54.9 BACK PAIN AFFECTING PREGNANCY IN SECOND TRIMESTER: ICD-10-CM

## 2022-02-14 DIAGNOSIS — Z3A.25 25 WEEKS GESTATION OF PREGNANCY: ICD-10-CM

## 2022-02-14 DIAGNOSIS — O99.891 BACK PAIN AFFECTING PREGNANCY IN SECOND TRIMESTER: ICD-10-CM

## 2022-02-14 DIAGNOSIS — O99.210 OBESITY IN PREGNANCY: ICD-10-CM

## 2022-02-14 DIAGNOSIS — Z34.92 PRENATAL CARE IN SECOND TRIMESTER: Primary | ICD-10-CM

## 2022-02-14 DIAGNOSIS — U07.1 COVID-19 AFFECTING PREGNANCY IN SECOND TRIMESTER: ICD-10-CM

## 2022-02-14 LAB
SL AMB  POCT GLUCOSE, UA: NORMAL
SL AMB POCT URINE PROTEIN: NORMAL

## 2022-02-14 PROCEDURE — 81002 URINALYSIS NONAUTO W/O SCOPE: CPT | Performed by: NURSE PRACTITIONER

## 2022-02-14 PROCEDURE — PNV: Performed by: NURSE PRACTITIONER

## 2022-02-14 NOTE — PATIENT INSTRUCTIONS
Pregnancy at 23 to 26 [address]:   What changes are happening in my body? You are now close to or at the beginning of the third trimester  The third trimester starts at 24 weeks and ends with delivery  As your baby gets larger, you may develop certain symptoms  These may include pain in your back or down the sides of your abdomen  You may also have stretch marks on your abdomen, breasts, thighs, or buttocks  You may also have constipation  How do I care for myself at this stage of my pregnancy? · Eat a variety of healthy foods  Healthy foods include fruits, vegetables, whole-grain breads, low-fat dairy foods, beans, lean meats, and fish  Drink liquids as directed  Ask how much liquid to drink each day and which liquids are best for you  Limit caffeine to less than 200 milligrams each day  Limit your intake of fish to 2 servings each week  Choose fish low in mercury such as canned light tuna, shrimp, salmon, cod, or tilapia  Do not  eat fish high in mercury such as swordfish, tilefish, tamiko mackerel, and shark  · Manage back pain  Do not stand for long periods of time or lift heavy items  Use good posture while you stand, squat, or bend  Wear low-heeled shoes with good support  Rest may also help to relieve back pain  Ask your healthcare provider about exercises you can do to strengthen your back muscles  · Take prenatal vitamins as directed  Your need for certain vitamins and minerals, such as folic acid, increases during pregnancy  Prenatal vitamins provide some of the extra vitamins and minerals you need  Prenatal vitamins may also help to decrease the risk of certain birth defects  · Talk to your healthcare provider about exercise  Moderate exercise can help you stay fit  Your healthcare provider will help you plan an exercise program that is safe for you during pregnancy  · Do not smoke    Smoking increases your risk of a miscarriage and other health problems during your pregnancy  Smoking can cause your baby to be born too early or weigh less at birth  Ask your healthcare provider for information if you need help quitting  · Do not drink alcohol  Alcohol passes from your body to your baby through the placenta  It can affect your baby's brain development and cause fetal alcohol syndrome (FAS)  FAS is a group of conditions that causes mental, behavior, and growth problems  · Talk to your healthcare provider before you take any medicines  Many medicines may harm your baby if you take them when you are pregnant  Do not take any medicines, vitamins, herbs, or supplements without first talking to your healthcare provider  Never use illegal or street drugs (such as marijuana or cocaine) while you are pregnant  What are some safety tips during pregnancy? · Avoid hot tubs and saunas  Do not use a hot tub or sauna while you are pregnant, especially during your first trimester  Hot tubs and saunas may raise your baby's temperature and increase the risk of birth defects  · Avoid toxoplasmosis  This is an infection caused by eating raw meat or being around infected cat feces  It can cause birth defects, miscarriages, and other problems  Wash your hands after you touch raw meat  Make sure any meat is well-cooked before you eat it  Avoid raw eggs and unpasteurized milk  Use gloves or ask someone else to clean your cat's litter box while you are pregnant  What changes are happening with my baby? By 26 weeks, your baby will weigh about 2 pounds  Your baby will be about 10 inches long from the top of the head to the rump (baby's bottom)  Your baby's movements are much stronger now  Your baby's eyes are almost completely formed and can partially open  Your baby also sleeps and wakes up  What do I need to know about prenatal care? Your healthcare provider will check your blood pressure and weight   You may also need the following:  · A urine test  may also be done to check for sugar and protein  These can be signs of gestational diabetes or infection  Protein in your urine may also be a sign of preeclampsia  Preeclampsia is a condition that can develop during week 20 or later of your pregnancy  It causes high blood pressure, and it can cause problems with your kidneys and other organs  · Fundal height  is a measurement of your uterus to check your baby's growth  This number is usually the same as the number of weeks that you have been pregnant  · Your baby's heart rate  will be checked  When should I seek immediate care? · You develop a severe headache that does not go away  · You have new or increased vision changes, such as blurred or spotted vision  · You have new or increased swelling in your face or hands  · You have vaginal spotting or bleeding  · Your water broke or you feel warm water gushing or trickling from your vagina  When should I call my doctor or obstetrician? · You have abdominal cramps, pressure, or tightening  · You have a change in vaginal discharge  · You have light bleeding  · You have chills or a fever  · You have vaginal itching, burning, or pain  · You have yellow, green, white, or foul-smelling vaginal discharge  · You have pain or burning when you urinate, less urine than usual, or pink or bloody urine  · You have questions or concerns about your condition or care  CARE AGREEMENT:   You have the right to help plan your care  Learn about your health condition and how it may be treated  Discuss treatment options with your healthcare providers to decide what care you want to receive  You always have the right to refuse treatment  The above information is an  only  It is not intended as medical advice for individual conditions or treatments  Talk to your doctor, nurse or pharmacist before following any medical regimen to see if it is safe and effective for you    © Copyright "[employer] 2021 Information is for [employer] use only and may not be sold, redistributed or otherwise used for commercial purposes   All illustrations and images included in CareNotes® are the copyrighted property of A D A M , Inc  or Alma Diego

## 2022-02-14 NOTE — PROGRESS NOTES
Patient here for PN visit  She states she has lower back and hip pain; denies spotting or LOF  Good fetal movement  28 week lab slip given  Urine neg for protein and glucose

## 2022-02-14 NOTE — ASSESSMENT & PLAN NOTE
Saeid Oliva  is a 32 y o  Rosan Height @25w3d who presents for routine prenatal visit  Having lower back pain  No fetal movement yet-anterior placenta  Denies contractions, cramping, leakage of fluid or vaginal bleeding  Declines COVID vaccine  S/P flu vaccine  1/6 normal anatomy scan  Having a girl  Declines all genetic testing and MSAFP  For growth @ 32 weeks (2/24)  Lab slips given for 28 week labs  TDap @ next visit  Yellow packet @ next visit    Reviewed reasons to call

## 2022-02-14 NOTE — PROGRESS NOTES
25 weeks gestation of pregnancy  For routine prenatal visit    Prenatal care in second trimester  Grayson Fernandes  is a 32 y o  Luz Elena Mckeon @25w3d who presents for routine prenatal visit  Having lower back pain  No fetal movement yet-anterior placenta  Denies contractions, cramping, leakage of fluid or vaginal bleeding  Declines COVID vaccine  S/P flu vaccine  1/6 normal anatomy scan  Having a girl  Declines all genetic testing and MSAFP  For growth @ 32 weeks (2/24)  Lab slips given for 28 week labs  TDap @ next visit  Yellow packet @ next visit    Reviewed reasons to call  COVID-19 affecting pregnancy in second trimester  + COVID test @ 18 weeks  Third trimester growth scan  Obesity in pregnancy  Walking encouraged  TWG=14 5 #    Back pain affecting pregnancy in second trimester  Would like referral for PT  Provided

## 2022-02-24 ENCOUNTER — ULTRASOUND (OUTPATIENT)
Dept: PERINATAL CARE | Facility: OTHER | Age: 27
End: 2022-02-24
Payer: COMMERCIAL

## 2022-02-24 VITALS
SYSTOLIC BLOOD PRESSURE: 114 MMHG | HEART RATE: 81 BPM | HEIGHT: 65 IN | WEIGHT: 201.2 LBS | BODY MASS INDEX: 33.52 KG/M2 | DIASTOLIC BLOOD PRESSURE: 68 MMHG

## 2022-02-24 DIAGNOSIS — O99.210 OBESITY IN PREGNANCY: ICD-10-CM

## 2022-02-24 DIAGNOSIS — O98.512 COVID-19 AFFECTING PREGNANCY IN SECOND TRIMESTER: ICD-10-CM

## 2022-02-24 DIAGNOSIS — U07.1 COVID-19 AFFECTING PREGNANCY IN SECOND TRIMESTER: ICD-10-CM

## 2022-02-24 DIAGNOSIS — Z13.79 GENETIC SCREENING: ICD-10-CM

## 2022-02-24 DIAGNOSIS — O35.8XX0 ANOMALY OF HEART OF FETUS AFFECTING PREGNANCY, ANTEPARTUM, SINGLE OR UNSPECIFIED FETUS: ICD-10-CM

## 2022-02-24 DIAGNOSIS — Z3A.26 26 WEEKS GESTATION OF PREGNANCY: Primary | ICD-10-CM

## 2022-02-24 PROBLEM — O35.BXX0 FETAL CARDIAC ANOMALY AFFECTING PREGNANCY, ANTEPARTUM: Status: ACTIVE | Noted: 2022-02-24

## 2022-02-24 PROCEDURE — 99213 OFFICE O/P EST LOW 20 MIN: CPT | Performed by: OBSTETRICS & GYNECOLOGY

## 2022-02-24 PROCEDURE — 76816 OB US FOLLOW-UP PER FETUS: CPT | Performed by: OBSTETRICS & GYNECOLOGY

## 2022-02-24 NOTE — LETTER
February 24, 2022     [facility]  [address]    Patient: Lety Westbrook   YOB: 1995   Date of Visit: 2/24/2022       Dear Dr Levi Espino: Thank you for referring Katlin Iqbal to me for evaluation  Below are my notes for this consultation  If you have questions, please do not hesitate to call me  I look forward to following your patient along with you  Sincerely,        Esthela Steve MD        CC: MD Audrey Rasmussen MD Luwana Belfast, MD  2/24/2022  3:13 PM  Sign when Signing Visit  A fetal ultrasound was completed  See Ob procedures in Epic for an interpretation and recommendations  Do not hesitate to contact us in [facility] if you have questions  Don Ramon MD, [address] [place] [address]  Maternal Fetal Medicine

## 2022-02-24 NOTE — PROGRESS NOTES
Patient chose to use [facility] and was given lab slip for the Noninvasive Prenatal Screen -[facility] Qnatal Advanced  Blood sample is drawn at [facility] and online appointment scheduling and lab locations can be found @ www  [employer]     Qnatal  card given, patient instructed how to check her out- of -pocket responsibility @ [facility]  Patient aware that  is provided by third party and is only an estimate of cost ,not a guarantee  For definitive cost, patient encouraged to call her insurance provider- insurance phone # located on the back of her ID card  Some Insurance plans may require prior authorization before blood is drawn, patient instructed to check with her plan before she goes to lab and notify [facility]  Patient made aware insurance authorization does not mean test is covered 100%  Information on how to check OOP NIPT coverage/ check if a prior authorization needed for NIPT was also provided to patient during the appointment scheduling of her [facility] NT appt  Patient made aware Qnatal results typically are available in 7-10 business days after blood draw and to call [facility] if she does not receive notification of her results  Patient made aware that viewing Qnatal results online will reveal gender  Patient verbalized understanding of all instructions and no questions at this time  She took the [facility] and will wait for a call from [facility] about Prior Authorization before havinf the test drawn  Message sent to [address] [place] [address] [facility] for the initiation of prior Auth

## 2022-02-24 NOTE — LETTER
February 24, 2022     [facility]  [address]    Patient: Raquel Carpenter   YOB: 1995   Date of Visit: 2/24/2022       Dear Dr Haydee Adkins: Thank you for referring Manjinder Ritter to me for evaluation  Below are my notes for this consultation  If you have questions, please do not hesitate to call me  I look forward to following your patient along with you  Sincerely,        Holli Hyde MD        CC: No Recipients  Holli Hyde MD  2/24/2022  3:09 PM  Incomplete  A fetal ultrasound was completed  See Ob procedures in Epic for an interpretation and recommendations  Do not hesitate to contact us in [facility] if you have questions  Lan Salazar MD, [facility]  Maternal Fetal Medicine

## 2022-02-24 NOTE — PROGRESS NOTES
A fetal ultrasound was completed  See Ob procedures in Epic for an interpretation and recommendations  Do not hesitate to contact us in [facility] if you have questions  Joseph Alvarado MD, [address] [facility]  Maternal Fetal Medicine

## 2022-02-27 ENCOUNTER — OFFICE VISIT (OUTPATIENT)
Dept: URGENT CARE | Facility: CLINIC | Age: 27
End: 2022-02-27
Payer: COMMERCIAL

## 2022-02-27 VITALS
HEART RATE: 90 BPM | OXYGEN SATURATION: 99 % | SYSTOLIC BLOOD PRESSURE: 94 MMHG | DIASTOLIC BLOOD PRESSURE: 64 MMHG | BODY MASS INDEX: 33.49 KG/M2 | WEIGHT: 201 LBS | RESPIRATION RATE: 18 BRPM | TEMPERATURE: 97.7 F | HEIGHT: 65 IN

## 2022-02-27 DIAGNOSIS — R11.10 VOMITING, INTRACTABILITY OF VOMITING NOT SPECIFIED, PRESENCE OF NAUSEA NOT SPECIFIED, UNSPECIFIED VOMITING TYPE: Primary | ICD-10-CM

## 2022-02-27 LAB
ATRIAL RATE: 89 BPM
P AXIS: 10 DEGREES
PR INTERVAL: 138 MS
QRS AXIS: 69 DEGREES
QRSD INTERVAL: 82 MS
QT INTERVAL: 348 MS
QTC INTERVAL: 423 MS
SL AMB  POCT GLUCOSE, UA: NEGATIVE
SL AMB LEUKOCYTE ESTERASE,UA: NEGATIVE
SL AMB POCT BILIRUBIN,UA: NEGATIVE
SL AMB POCT BLOOD,UA: ABNORMAL
SL AMB POCT CLARITY,UA: CLEAR
SL AMB POCT COLOR,UA: ABNORMAL
SL AMB POCT KETONES,UA: 160
SL AMB POCT NITRITE,UA: NEGATIVE
SL AMB POCT PH,UA: 6
SL AMB POCT SPECIFIC GRAVITY,UA: 1.01
SL AMB POCT URINE PROTEIN: ABNORMAL
SL AMB POCT UROBILINOGEN: 0.2
T WAVE AXIS: 66 DEGREES
VENTRICULAR RATE: 89 BPM

## 2022-02-27 PROCEDURE — 93005 ELECTROCARDIOGRAM TRACING: CPT | Performed by: PHYSICIAN ASSISTANT

## 2022-02-27 PROCEDURE — 99213 OFFICE O/P EST LOW 20 MIN: CPT | Performed by: PHYSICIAN ASSISTANT

## 2022-02-27 PROCEDURE — 81002 URINALYSIS NONAUTO W/O SCOPE: CPT | Performed by: PHYSICIAN ASSISTANT

## 2022-02-27 PROCEDURE — 93010 ELECTROCARDIOGRAM REPORT: CPT | Performed by: INTERNAL MEDICINE

## 2022-02-27 NOTE — PROGRESS NOTES
[facility]        NAME: Genoveva Bautista is a 32 y o  female  : 1995    MRN: [id number]  DATE: 2022  TIME: 10:54 AM    Assessment and Plan   Vomiting, intractability of vomiting not specified, presence of nausea not specified, unspecified vomiting type [R11 10]  1  Vomiting, intractability of vomiting not specified, presence of nausea not specified, unspecified vomiting type  POCT urine dip    ECG 12 lead     In depth discussion with pt and her mother about when to go to the ER  PT is stable now  Normal vitals and normal EKG  If pain returns I recommend ER   Close f/u with OBGYN     Patient Instructions   Patient Instructions   Your blood pressure was low and you had some ketones in your urine  Make sure you eat something and drink a lot of fluids today  Try heat and a warm bath for the shoulder  If the pain gets worse or you develop respiratory symptoms go to the ER         Follow up with PCP in 3-5 days  Proceed to  ER if symptoms worsen  Chief Complaint     Chief Complaint   Patient presents with    Nausea     27 weeks pregnant  yesterday started with n/v  this am having sharp stabbing pain into R shoulder that radiates into R side of chest  pain is intermittent  History of Present Illness       The patient is a 72-year-old female who is currently 27 weeks pregnant presenting today for nausea and vomiting x1 day  She vomited 2x yesterday and once today  Has been keeping water down  Has been throwing up bile  The nausea/vomiting are pretty much resolved at this moment  Pt did eat chicken prior to this episode  This morning she began to have a stabbing pain in her right shoulder radiating into the right side of her chest   The pain is intermittent  Normal oxygenation and no temperature in office  Patient feels fetal movement  Pain is a 10/10 when it is severe  No cramping or issues otherwise  Baby possible has a CHD but she is unsure  COVID in December    No shortness of breath or pain in the left side of her chest  Denies palpitations  Review of Systems   Review of Systems   Constitutional: Negative for activity change, appetite change, chills, fatigue and fever  HENT: Negative for congestion, rhinorrhea, sinus pressure, sinus pain and sore throat  Respiratory: Negative for cough, chest tightness and shortness of breath  Cardiovascular: Negative for chest pain and palpitations  Gastrointestinal: Negative for diarrhea, nausea (resolved) and vomiting (resolved)  Musculoskeletal: Positive for arthralgias (right shoulder)  Negative for myalgias  Skin: Negative for color change and pallor  Neurological: Negative for headaches  Current Medications       Current Outpatient Medications:     Prenatal Vit-Fe Fumarate-FA (PRENATAL VITAMINS PO), Take by mouth, Disp: , Rfl:     Current Allergies     Allergies as of 02/27/2022    (No Known Allergies)            The following portions of the patient's history were reviewed and updated as appropriate: allergies, current medications, past family history, past medical history, past social history, past surgical history and problem list      Past Medical History:   Diagnosis Date    Mononucleosis     Pleurisy        Past Surgical History:   Procedure Laterality Date    WISDOM TOOTH EXTRACTION         Family History   Problem Relation Age of Onset    No Known Problems Mother     Diabetes Father     Hypertension Father     No Known Problems Maternal Grandmother     No Known Problems Maternal Grandfather     No Known Problems Paternal Grandmother     Bone cancer Paternal Grandfather     No Known Problems Brother          Medications have been verified          Objective   BP 94/64 (BP Location: Left arm, Patient Position: Sitting)   Pulse 90   Temp 97 7 °F (36 5 °C) (Temporal)   Resp 18   Ht 5' 5" (1 651 m)   Wt 91 2 kg (201 lb)   LMP 08/20/2021 (LMP Unknown)   SpO2 99%   BMI 33 45 kg/m² Physical Exam     Physical Exam  Vitals and nursing note reviewed  Constitutional:       General: She is not in acute distress  Appearance: Normal appearance  She is normal weight  She is not ill-appearing, toxic-appearing or diaphoretic  HENT:      Head: Normocephalic and atraumatic  Cardiovascular:      Rate and Rhythm: Normal rate and regular rhythm  Heart sounds: Normal heart sounds  No murmur heard  No friction rub  No gallop  Pulmonary:      Effort: Pulmonary effort is normal  No respiratory distress  Breath sounds: Normal breath sounds  No stridor  No wheezing, rhonchi or rales  Chest:      Chest wall: No tenderness  Abdominal:      General: Bowel sounds are normal  There is no distension  Palpations: Abdomen is soft  There is no mass  Tenderness: There is no abdominal tenderness  There is no right CVA tenderness, left CVA tenderness, guarding or rebound  Hernia: No hernia is present  Musculoskeletal:         General: No swelling or tenderness  Normal range of motion  Skin:     General: Skin is warm and dry  Capillary Refill: Capillary refill takes less than 2 seconds  Neurological:      Mental Status: She is alert

## 2022-02-27 NOTE — PATIENT INSTRUCTIONS
Your blood pressure was low and you had some ketones in your urine  Make sure you eat something and drink a lot of fluids today  Try heat and a warm bath for the shoulder    If the pain gets worse or you develop respiratory symptoms go to the ER [facility]

## 2022-02-28 ENCOUNTER — DOCUMENTATION (OUTPATIENT)
Dept: PERINATAL CARE | Facility: CLINIC | Age: 27
End: 2022-02-28

## 2022-03-01 ENCOUNTER — ROUTINE PRENATAL (OUTPATIENT)
Dept: PEDIATRIC CARDIOLOGY | Facility: CLINIC | Age: 27
End: 2022-03-01
Payer: COMMERCIAL

## 2022-03-01 ENCOUNTER — TELEPHONE (OUTPATIENT)
Dept: PERINATAL CARE | Facility: CLINIC | Age: 27
End: 2022-03-01

## 2022-03-01 ENCOUNTER — TELEPHONE (OUTPATIENT)
Dept: PERINATAL CARE | Facility: OTHER | Age: 27
End: 2022-03-01

## 2022-03-01 VITALS
SYSTOLIC BLOOD PRESSURE: 118 MMHG | HEART RATE: 80 BPM | WEIGHT: 198.63 LBS | DIASTOLIC BLOOD PRESSURE: 68 MMHG | BODY MASS INDEX: 33.05 KG/M2

## 2022-03-01 DIAGNOSIS — O35.8XX0 ANOMALY OF HEART OF FETUS AFFECTING PREGNANCY, ANTEPARTUM, SINGLE OR UNSPECIFIED FETUS: Primary | ICD-10-CM

## 2022-03-01 PROCEDURE — 76825 ECHO EXAM OF FETAL HEART: CPT | Performed by: PEDIATRICS

## 2022-03-01 PROCEDURE — 76827 ECHO EXAM OF FETAL HEART: CPT | Performed by: PEDIATRICS

## 2022-03-01 PROCEDURE — 93325 DOPPLER ECHO COLOR FLOW MAPG: CPT | Performed by: PEDIATRICS

## 2022-03-01 PROCEDURE — 76820 UMBILICAL ARTERY ECHO: CPT | Performed by: PEDIATRICS

## 2022-03-01 PROCEDURE — 99205 OFFICE O/P NEW HI 60 MIN: CPT | Performed by: PEDIATRICS

## 2022-03-01 RX ORDER — ASPIRIN 81 MG/1
81 TABLET, CHEWABLE ORAL DAILY
COMMUNITY
End: 2022-05-19

## 2022-03-01 NOTE — PROGRESS NOTES
Fetal Cardiology Consult    Date of Visit:    3/1/2022  Gestational Age:  29w1d   Estimated Date of Delivery:    22     I had the opportunity to meet with Rod Chávez and her  Maurilio De La O today for a Fetal Cardiology Consult and Fetal Echocardiogram   The indication for the procedure was abnormal sizes of vessels in three vessel view  A fetal echocardiogram with color doppler flow mapping was performed (see full report under OB procedures)  There were poor acoustic windows  There is size discrepancy between the left and right ventricles with the left ventricle being smaller  The mitral valve annulus measures 6 8mm with a Z-score of-1 4  The aortic valve measures 2 7mm with a Z-score of-3 7  The valve leaflets appear thickened  There is normal flow velocity across the aortic valve  The aortic arch is small with the transverse arch measuring 3 3 mm in the aortic and the aortic isthmus measuring 2 2 mm with a Z-score of -2 7  There is a likely ventricular septal defect seen on her previous study  The small aortic arch and aortic valve with no flow acceleration across the aortic valve supports the presence of a ventricular septal defect - possibly a posterior malaligned VSD  There is otherwise normal anatomy and normal biventricular function  I reviewed the Fetal Echo findings and discussed the anatomy, physiology, and assessment needed after birth  I also reviewed the post-edgar clinical course, interventions possibly needed, and answered all questions  Assessment and Recommendations:  -Hypoplastic aortic valve, aortic arch, and aortic isthmus  LV/RV size discrepancy  Likely VSD   -Evaluation at [facility] fetal center for likely delivery at their center  Thank you for allowing me to participate in Paris's care  Feel free to contact me with questions      I spent 60minutes - on the day of service - reviewing the patient's chart, reviewing previous imaging studies, counseling the patient about the fetal findings, coordinating care, and documenting care  Rober Grove MD  Pediatric Cardiology  [address]  Fax: [phone]  [email or web address]

## 2022-03-01 NOTE — Clinical Note
[employer]  Looks like a hypoplastic aortic arch  Tough to see the VSD today compared to your study, but based on normal flow across the small aortic valve there should be VSD as well  They will need to deliver at [facility]  BODØ  I should be finalizing the fetal echo soon and you can bundle and send to [facility] fetal center      Thanks,  Catrachita Simmons

## 2022-03-01 NOTE — TELEPHONE ENCOUNTER
Pt was seen today by Ziggy Else he is referring her to [facility] for hypoplastic aortic arch within the week

## 2022-03-01 NOTE — TELEPHONE ENCOUNTER
Spoke with pt and informed her insurance Authorization for her [place] ([id number]) genetic screening has been approved  Auth # is [id number] with dates of service 2/28/2022-8/28/2022  Insurance Authorization is not a guarantee of payment and final determination is based on your member benefits, appropriateness of service provided and eligibility at time of services rendered and claim received  Please check if you have any OOP lab co-pay or deductible prior to completing screening  Pt receptive to information and declines questions at this time

## 2022-03-01 NOTE — TELEPHONE ENCOUNTER
Phone call to [facility] Cardiology, s/w nurse navigator Bonny Hurtado  Referral from Dr Adebayo Guzman, gave patient demographics, insurance and reason for referral  Per Bonny Hurtado patient will probably be scheduled in next 2-3 weeks and she will forward records from [facility] to CFDT team   Cardiology  03/01/22, [facility] 11/18/21, 01/06/22,02/24/22 copies of insurance cards, patient demographics sent via fax to # [phone] and confirmation records received "Complete" sent back @ 10:39  Per Bonny Hurtado she will review records sent and call patient directly for f/u appt  S/W Rod Chávez, made aware [facility] nurse navigator will be calling her directly for appt scheduling

## 2022-03-02 NOTE — TELEPHONE ENCOUNTER
[facility]  scheduled Wed March 16th  Dr Eliel Mejia updated via in basket message

## 2022-03-13 ENCOUNTER — APPOINTMENT (OUTPATIENT)
Dept: LAB | Age: 27
End: 2022-03-13
Payer: COMMERCIAL

## 2022-03-13 DIAGNOSIS — Z34.92 PRENATAL CARE IN SECOND TRIMESTER: ICD-10-CM

## 2022-03-13 LAB
# FETUSES US: 1
CFDNA.FET/TOTAL PLAS.CFDNA: NORMAL
ERYTHROCYTE [DISTWIDTH] IN BLOOD BY AUTOMATED COUNT: 12.8 % (ref 11.6–15.1)
FET CHR 13 TS PLAS.CFDNA QL: NEGATIVE
FET CHR 18 TS PLAS.CFDNA QL: NEGATIVE
FET CHR 21 TS PLAS.CFDNA QL: NEGATIVE
FET CHR X + Y ANEUP PLAS.CFDNA QL: NORMAL
FET CHROM X + Y ANEUP CFDNA IMP: NORMAL
FET Y CHROM PLAS.CFDNA QL: NOT DETECTED
FET Y CHROM PLAS.CFDNA: NORMAL
GA (DAYS): 1 D
GA (WEEKS): 28 WK
GLUCOSE 1H P 50 G GLC PO SERPL-MCNC: 78 MG/DL (ref 40–134)
HCT VFR BLD AUTO: 39.5 % (ref 34.8–46.1)
HGB BLD-MCNC: 12.8 G/DL (ref 11.5–15.4)
MCH RBC QN AUTO: 32.1 PG (ref 26.8–34.3)
MCHC RBC AUTO-ENTMCNC: 32.4 G/DL (ref 31.4–37.4)
MCV RBC AUTO: 99 FL (ref 82–98)
MICRODELETION SYND BLD/T FISH: NORMAL
PLATELET # BLD AUTO: 240 THOUSANDS/UL (ref 149–390)
PMV BLD AUTO: 11.4 FL (ref 8.9–12.7)
RBC # BLD AUTO: 3.99 MILLION/UL (ref 3.81–5.12)
REF LAB TEST METHOD: NORMAL
SERVICE CMNT-IMP: NORMAL
SERVICE CMNT-IMP: NORMAL
SL AMB ABNORMAL MSS?: NORMAL
SL AMB ABNORMAL US?: YES
SL AMB ADVANCED MATERNAL AGE?: NO
SL AMB MICRODELETION INTERP: NORMAL
SL AMB MICRODELETION: NORMAL
SL AMB PERSONAL/FAM HISTORY?: NORMAL
SL AMB SPECIFICATIONS: NORMAL
WBC # BLD AUTO: 7.38 THOUSAND/UL (ref 4.31–10.16)

## 2022-03-13 PROCEDURE — 36415 COLL VENOUS BLD VENIPUNCTURE: CPT

## 2022-03-13 PROCEDURE — 86592 SYPHILIS TEST NON-TREP QUAL: CPT

## 2022-03-13 PROCEDURE — 85027 COMPLETE CBC AUTOMATED: CPT

## 2022-03-13 PROCEDURE — 82950 GLUCOSE TEST: CPT

## 2022-03-14 LAB — RPR SER QL: NORMAL

## 2022-03-14 NOTE — RESULT ENCOUNTER NOTE
I have reviewed the patient's lab results which are normal   Please contact the patient to inform her of the normal results, if she has not reviewed them in Esequiel Maldonado MD

## 2022-03-15 ENCOUNTER — ROUTINE PRENATAL (OUTPATIENT)
Dept: OBGYN CLINIC | Age: 27
End: 2022-03-15
Payer: COMMERCIAL

## 2022-03-15 VITALS
DIASTOLIC BLOOD PRESSURE: 62 MMHG | BODY MASS INDEX: 34.49 KG/M2 | WEIGHT: 207 LBS | HEIGHT: 65 IN | SYSTOLIC BLOOD PRESSURE: 114 MMHG

## 2022-03-15 DIAGNOSIS — Z3A.29 29 WEEKS GESTATION OF PREGNANCY: ICD-10-CM

## 2022-03-15 DIAGNOSIS — O35.8XX0 ANOMALY OF HEART OF FETUS AFFECTING PREGNANCY, ANTEPARTUM, SINGLE OR UNSPECIFIED FETUS: Primary | ICD-10-CM

## 2022-03-15 DIAGNOSIS — Z23 NEED FOR TDAP VACCINATION: ICD-10-CM

## 2022-03-15 PROCEDURE — 90715 TDAP VACCINE 7 YRS/> IM: CPT

## 2022-03-15 PROCEDURE — 90471 IMMUNIZATION ADMIN: CPT

## 2022-03-15 PROCEDURE — PNV: Performed by: STUDENT IN AN ORGANIZED HEALTH CARE EDUCATION/TRAINING PROGRAM

## 2022-03-15 NOTE — PROGRESS NOTES
32 y o   at 29w4d, here for return OB visit  Feeling well overall and without physical concerns  Anxious for [facility] consult  Good FM  Denies LOF, VB, contractions  Denies dysuria, hematuria  Problem List Items Addressed This Visit        Cardiovascular and Mediastinum    Fetal cardiac anomaly affecting pregnancy, antepartum - Primary     Has consult with [facility] tomorrow         Relevant Orders    POCT urine dip       Other    29 weeks gestation of pregnancy     -precautions reviewed  -s/p Tdap/flu vaccines  -prepregnancy BMI 31 with goal weight gain 11-20#: TWG = 18#   -delivery paperwork not signed as likely to deliver at [address]   Will follow up and sign if necessary           Other Visit Diagnoses     Need for Tdap vaccination        Relevant Orders    TDAP VACCINE GREATER THAN OR EQUAL TO 6YO IM

## 2022-03-15 NOTE — PROGRESS NOTES
Pt is here for routine ob visit  No concerns at this time  Unable to void  No LOF,VB,Contractions  +FM   28wk labs completed   UTD flu   tdap given today/pt tolerated well   Pt will deliver at [facility] no yellow folder given or delivery consent signed   [facility] Pump faxed

## 2022-03-15 NOTE — ASSESSMENT & PLAN NOTE
-precautions reviewed  -s/p Tdap/flu vaccines  -prepregnancy BMI 31 with goal weight gain 11-20#: TWG = 18#   -delivery paperwork not signed as likely to deliver at [facility]   Will follow up and sign if necessary

## 2022-03-15 NOTE — TELEPHONE ENCOUNTER
Qnatal report ( resulted 03/13/22)  [address]ed to [address]  [facility] received confirmation report sent "Complete" @ 09:44  Phone call to Deepak Souza @ [facility] CFDT # [phone]- reviewed Dr Estefany Gilmore note from [address] report 01/06/22 states "The patient had declined genetic screening"  Dr Cassie Mina offered NIPT again 02/24/22, patient had blood draw 03/03 and Qnatal resulted 03/13  Deepak Souza verbalized she will review above with [facility] Genetic Counselor  Has MFM # to call back any questions

## 2022-03-23 PROBLEM — O09.93 SUPERVISION OF HIGH-RISK PREGNANCY, THIRD TRIMESTER: Status: ACTIVE | Noted: 2022-03-15

## 2022-03-23 PROBLEM — Z3A.20 20 WEEKS GESTATION OF PREGNANCY: Status: RESOLVED | Noted: 2022-01-11 | Resolved: 2022-03-23

## 2022-03-23 PROBLEM — Z3A.31 31 WEEKS GESTATION OF PREGNANCY: Status: ACTIVE | Noted: 2022-02-14

## 2022-03-23 PROBLEM — Z3A.21 21 WEEKS GESTATION OF PREGNANCY: Status: RESOLVED | Noted: 2022-01-18 | Resolved: 2022-03-23

## 2022-03-23 PROBLEM — Z34.93 PRENATAL CARE IN THIRD TRIMESTER: Status: ACTIVE | Noted: 2022-01-18

## 2022-03-25 ENCOUNTER — TELEPHONE (OUTPATIENT)
Dept: OBGYN CLINIC | Facility: CLINIC | Age: 27
End: 2022-03-25

## 2022-03-25 NOTE — TELEPHONE ENCOUNTER
Lm for pt to contact office, we will need new forms as she has completed our sections  Pt returned call and emailed a new copy to me

## 2022-03-25 NOTE — PROGRESS NOTES
Pt is here for prenatal ob visit today  No question's or concerns at this time  Having a Girl! GA: 31w3d  KITTY: 2022    Urine: Protein Negative / Glucose Negative  No LOF, VB, Contractions  +FM  28 wk labs completed  UTD with flu and tdap vaccines

## 2022-03-28 ENCOUNTER — ROUTINE PRENATAL (OUTPATIENT)
Dept: OBGYN CLINIC | Age: 27
End: 2022-03-28

## 2022-03-28 VITALS
HEIGHT: 65 IN | BODY MASS INDEX: 34.26 KG/M2 | DIASTOLIC BLOOD PRESSURE: 68 MMHG | SYSTOLIC BLOOD PRESSURE: 106 MMHG | WEIGHT: 205.6 LBS

## 2022-03-28 DIAGNOSIS — O98.512 COVID-19 AFFECTING PREGNANCY IN SECOND TRIMESTER: ICD-10-CM

## 2022-03-28 DIAGNOSIS — O99.210 OBESITY IN PREGNANCY: ICD-10-CM

## 2022-03-28 DIAGNOSIS — U07.1 COVID-19 AFFECTING PREGNANCY IN SECOND TRIMESTER: ICD-10-CM

## 2022-03-28 DIAGNOSIS — Z3A.31 31 WEEKS GESTATION OF PREGNANCY: Primary | ICD-10-CM

## 2022-03-28 DIAGNOSIS — O09.93 SUPERVISION OF HIGH-RISK PREGNANCY, THIRD TRIMESTER: ICD-10-CM

## 2022-03-28 DIAGNOSIS — Z34.93 PRENATAL CARE IN THIRD TRIMESTER: ICD-10-CM

## 2022-03-28 LAB
SL AMB  POCT GLUCOSE, UA: NEGATIVE
SL AMB POCT URINE PROTEIN: NEGATIVE

## 2022-03-28 PROCEDURE — PNV

## 2022-03-28 NOTE — ASSESSMENT & PLAN NOTE
Bulmaro Willams is a 32 y o  Urbano Crews who presents for routine PNV  28 week labs reviewed: WNL  TWG 7 53 kg (16 lb 9 6 oz)   Denies OB complaints  Good fetal movement  Denies contractions, cramping, leakage of fluid or vaginal bleeding  S/p Tdap vaccine  Reviewed  labor precautions and FKCs     Advised to start Perineal massage at 34-36 weeks   Pregnancy Essential guide and Baby and Me web site recommended

## 2022-03-28 NOTE — PROGRESS NOTES
Problem   Fetal Cardiac Anomaly Affecting Pregnancy, Antepartum   31 Weeks Gestation of Pregnancy    Blood Type: A positive  Antibody negative  Pap 2021  Normal  GC/CT negative  PN1 Labs: WNL  Hgb: 14 0  28 Week Labs: WNL  Passed GTT  Hgb 12 8               31 weeks gestation of pregnancy  Cale Tapia is a 32 y o  Sharlie Baba who presents for routine PNV  28 week labs reviewed: WNL  TWG 7 53 kg (16 lb 9 6 oz)   Denies OB complaints  Good fetal movement  Denies contractions, cramping, leakage of fluid or vaginal bleeding  S/p Tdap vaccine  Reviewed  labor precautions and FKCs  Advised to start Perineal massage at 34-36 weeks   Pregnancy Essential guide and Baby and Me web site recommended       Fetal cardiac anomaly affecting pregnancy, antepartum  Pediatric Cardiology consult on 2022 showed likely VSD  Recommended follow up evaluation at [facility] fetal center with likely delivery at [facility]  Patient had consult with [facility] and has upcoming appointment on 22 to determine delivery location  Yellow folder reviewed as patient is uncertain where she will deliver

## 2022-03-28 NOTE — ASSESSMENT & PLAN NOTE
Pediatric Cardiology consult on 03/01/2022 showed likely VSD  Recommended follow up evaluation at [facility] fetal center with likely delivery at [facility]  Patient had consult with [facility] and has upcoming appointment on 04/27/22 to determine delivery location  Yellow folder reviewed as patient is uncertain where she will deliver

## 2022-03-28 NOTE — PATIENT INSTRUCTIONS
Pregnancy at 31 to 34 Weeks   AMBULATORY CARE:   Changes happening with your body: You may continue to have symptoms such as shortness of breath, heartburn, contractions, or swelling of your ankles and feet  You may be gaining about 1 pound a week now  Seek care immediately if:   · You develop a severe headache that does not go away  · You have new or increased vision changes, such as blurred or spotted vision  · You have new or increased swelling in your face or hands  · You have vaginal spotting or bleeding  · Your water broke or you feel warm water gushing or trickling from your vagina  Call your obstetrician if:   · You have more than 5 contractions in 1 hour  · You notice any changes in your baby's movements  · You have abdominal cramps, pressure, or tightening  · You have a change in vaginal discharge  · You have chills or a fever  · You have vaginal itching, burning, or pain  · You have yellow, green, white, or foul-smelling vaginal discharge  · You have pain or burning when you urinate, less urine than usual, or pink or bloody urine  · You have questions or concerns about your condition or care  How to care for yourself at this stage of your pregnancy:       · Eat a variety of healthy foods  Healthy foods include fruits, vegetables, whole-grain breads, low-fat dairy foods, beans, lean meats, and fish  Drink liquids as directed  Ask how much liquid to drink each day and which liquids are best for you  Limit caffeine to less than 200 milligrams each day  Limit your intake of fish to 2 servings each week  Choose fish low in mercury such as canned light tuna, shrimp, salmon, cod, or tilapia  Do not  eat fish high in mercury such as swordfish, tilefish, tamiko mackerel, and shark  · Manage heartburn  by eating 4 or 5 small meals each day instead of large meals  Avoid spicy food  · Manage swelling  by lying down and putting your feet up           · Take prenatal vitamins as directed  Your need for certain vitamins and minerals, such as folic acid, increases during pregnancy  Prenatal vitamins provide some of the extra vitamins and minerals you need  Prenatal vitamins may also help to decrease the risk of certain birth defects  · Talk to your healthcare provider about exercise  Moderate exercise can help you stay fit  Your healthcare provider will help you plan an exercise program that is safe for you during pregnancy  · Do not smoke  Smoking increases your risk of a miscarriage and other health problems during your pregnancy  Smoking can cause your baby to be born too early or weigh less at birth  Ask your healthcare provider for information if you need help quitting  · Do not drink alcohol  Alcohol passes from your body to your baby through the placenta  It can affect your baby's brain development and cause fetal alcohol syndrome (FAS)  FAS is a group of conditions that causes mental, behavior, and growth problems  · Talk to your healthcare provider before you take any medicines  Many medicines may harm your baby if you take them when you are pregnant  Do not take any medicines, vitamins, herbs, or supplements without first talking to your healthcare provider  Never use illegal or street drugs (such as marijuana or cocaine) while you are pregnant  Safety tips during pregnancy:   · Avoid hot tubs and saunas  Do not use a hot tub or sauna while you are pregnant, especially during your first trimester  Hot tubs and saunas may raise your baby's temperature and increase the risk of birth defects  · Avoid toxoplasmosis  This is an infection caused by eating raw meat or being around infected cat feces  It can cause birth defects, miscarriages, and other problems  Wash your hands after you touch raw meat  Make sure any meat is well-cooked before you eat it  Avoid raw eggs and unpasteurized milk   Use gloves or ask someone else to clean your cat's litter box while you are pregnant  Changes happening with your baby:  By 34 weeks, your baby may weigh more than 5 pounds  Your baby will be about 12 ½ inches long from the top of the head to the rump (baby's bottom)  Your baby is gaining about ½ pound a week  Your baby's eyes open and close now  Your baby's kicks and movements are more forceful at this time  What you need to know about prenatal care: Your healthcare provider will check your blood pressure and weight  You may also need the following:  · A urine test  may also be done to check for sugar and protein  These can be signs of gestational diabetes or infection  Protein in your urine may also be a sign of preeclampsia  Preeclampsia is a condition that can develop during week 20 or later of your pregnancy  It causes high blood pressure, and it can cause problems with your kidneys and other organs  · A gestational diabetes screen  may be done  Your healthcare provider may order either a 1-step or 2-step oral glucose tolerance test (OGTT)  ? 1-step OGTT:  Your blood sugar level will be tested after you have not eaten for 8 hours (fasting)  You will then be given a glucose drink  Your level will be tested again 1 hour and 2 hours after you finish the drink  ? 2-step OGTT:  You do not have to fast for the first part of the test  You will have the glucose drink at any time of day  Your blood sugar level will be checked 1 hour later  If your blood sugar is higher than a certain level, another test will be ordered  You will fast and your blood sugar level will be tested  You will have the glucose drink  Your blood will be tested again 1 hour, 2 hours, and 3 hours after you finish the glucose drink  · A Tdap vaccine  may be recommended by your healthcare provider  · Fundal height  is a measurement of your uterus to check your baby's growth  This number is usually the same as the number of weeks that you have been pregnant   Your healthcare provider may also check your baby's position  · Your baby's heart rate  will be checked  Follow up with your obstetrician as directed:  Write down your questions so you remember to ask them during your visits  © Copyright [employer] 2022 Information is for End User's use only and may not be sold, redistributed or otherwise used for commercial purposes  All illustrations and images included in CareNotes® are the copyrighted property of A D A M , Inc  or [facility] [place]note 2[place] An Win   The above information is an  only  It is not intended as medical advice for individual conditions or treatments  Talk to your doctor, nurse or pharmacist before following any medical regimen to see if it is safe and effective for you  Perineal Massage    Perineal massage is recommended starting after 34 weeks in order to reduce risks of perineal tearing during childbirth  You have been provided and instructional sheet in your yellow 28 week prenatal packet  [place] Contractions   WHAT YOU NEED TO KNOW:   What are [place]berry contractions? [place] contractions are tightening and squeezing of the muscles of your uterus (womb) during pregnancy  The uterine muscles control the uterus  [place] contractions stop on their own  They are not true labor contractions and do not cause your cervix (opening to your uterus) to dilate (open)  What causes [place] contractions? [place] contractions may get your body ready for true labor  They may increase blood flow to the placenta  The placenta forms during pregnancy and provides oxygen and nutrition to your unborn baby  The placenta also removes waste products from the unborn baby  The following may also cause [place] contractions to happen:  · Exercise    · Dehydration    · Sex    · A full bladder    What are the signs and symptoms of [place] contractions?    · Pain or discomfort in your groin or lower abdomen that comes and goes    · Your contractions are short, and do not last longer each time    · Your contractions do not get closer together each time    · Your contractions do not get stronger or more painful each time    · Your contractions stop when you change your position or rest    How are [place] contractions diagnosed? Your healthcare provider may examine your cervix to look for changes such as dilation or fluid  He or she may ask you how long your contractions last, how often they happen, and where you feel them  Use a clock or watch to time contractions  Write down how much time passes between each contraction and how long each contraction lasts  Your healthcare provider may watch you for several hours to make sure that true labor does not begin  How are [place] contractions treated? Your healthcare provider may give you pain medicine or medicine to help you relax  If you are dehydrated, he or she may give you fluids through an IV or have you drink liquids  How can I manage my symptoms? · Change your activity or your position  when you feel contractions begin  Walk if you have been lying or sitting  Lie down if you have been standing or walking  True labor will not stop by changing your position or activity  · Take a warm bath  to relax your body  · Drink more liquids  to prevent dehydration  Ask how much liquid to drink each day and which liquids are best for you  · Practice your labor breathing  to decrease your discomfort  This may help you get ready for true labor  Take slow, deep breaths, or fast, short breaths  Ask your healthcare provider how to practice labor breathing  When should I seek immediate care? · You have bleeding from your vagina  · You have fluid leaking from your vagina that does not stop  · You feel a gush of fluid from your vagina  · Your contractions happen every 5 minutes or sooner, and last for more than 60 seconds      · Your contractions begin to feel stronger or more painful  · You feel a change in your baby's movement, or you feel fewer than 6 to 10 movements in an hour  When should I call my doctor? · You have a fever  · You have questions or concerns about your condition or care  CARE AGREEMENT:   You have the right to help plan your care  Learn about your health condition and how it may be treated  Discuss treatment options with your healthcare providers to decide what care you want to receive  You always have the right to refuse treatment  The above information is an  only  It is not intended as medical advice for individual conditions or treatments  Talk to your doctor, nurse or pharmacist before following any medical regimen to see if it is safe and effective for you  © Copyright [employer]  Information is for End User's use only and may not be sold, redistributed or otherwise used for commercial purposes  All illustrations and images included in CareNotes® are the copyrighted property of A D A M , Inc  or [employer]      Labor   AMBULATORY CARE:    (premature) labor  occurs when the uterus contracts and your cervix opens earlier than normal  The cervix is the opening of your uterus   labor happens after the 20th week of pregnancy but before the 37th week  You may have premature rupture of membranes (PROM)  PROM means your water broke before labor began  An early labor could cause you to have your baby before he or she is ready to be born  Common signs and symptoms include the following: You may not know that you are having  labor  It is common to have  contractions (tightening and relaxing of the uterus) and not notice them   The following are signs and symptoms that suggest a  labor:  · Contractions that get stronger and closer together    · Changes in vaginal discharge, such as more discharge or discharge that is watery or bloody     · Low back pain · Pressure in the lower abdomen     · Vaginal spotting or bleeding    Call your local emergency number (911 in the [address]) if:   · You see or feel like there is something in your vagina  Call your doctor if:   · You have bright red, painless vaginal bleeding  · Your symptoms do not get better or they get worse  · Your water broke or you feel warm water gushing or trickling from your vagina  · You have contractions that get stronger and closer together for more than 1 hour  · You notice a decrease in your baby's movement  · You have abdominal cramps, pressure, or tightening  · You have a change in vaginal discharge  · You have a fever  · You have burning when you urinate or you are urinating less than is normal for you  · You have questions or concerns about your condition or care  How  labor is diagnosed: You may have one or more of the following tests to check for  labor:  · A pelvic exam  is also called an internal or vaginal exam  During a pelvic exam, your healthcare provider will gently put a warmed speculum into your vagina  A speculum is a tool that opens your vagina  This lets your healthcare provider see if your cervix is opening  · A vaginal ultrasound  uses sound waves to show pictures of your cervix and your baby inside your uterus  During this test, a small tube is placed into your vagina  This test will help your healthcare provider see if your cervix is opening  · A fetal ultrasound  uses sound waves to show pictures of your baby inside your uterus  The movement, heart rate, and position of your baby can also be seen  · A fetal fibronectin test  checks for a protein called fetal fibronectin in the cervix or vagina  Normally, there is no protein in cervical and vaginal secretions until the 20th week of pregnancy up to the end of pregnancy  · Blood and urine tests  may be done to look for signs of infection      Treatment for  labor  may delay delivery  You may need any of the following:  · Bed rest  may be recommended  You may need to lie on your left side, which improves circulation to the uterus and baby  Your healthcare provider will tell you when it is okay to get out of bed  · Medicine  may be given to stop contractions if your baby is not ready to be born  You may also need certain medicines if your  labor cannot be stopped and your healthcare provider thinks you will have your baby early  These medicines help your baby's lungs, brain, and digestive organs mature  They also help decrease your baby's risk of being born with cerebral palsy  If you have PROM, fluid from your vagina or rectum will be checked for a strep infection  You may be given antibiotics to prevent a strep infection during delivery  Antibiotics may also be used to prevent labor from starting  You may also need steroids to decrease the risk for complications due to  labor  Self-care:   · Rest  as much as possible  You may need to lie on your left side to improve circulation to the uterus and baby  You may be able to prevent  labor by resting and reducing your physical activity  · Ask your healthcare provider about activities that are safe for you to do  Your healthcare provider or obstetrician may recommend that you avoid sexual intercourse  Ask your healthcare provider if exercise is safe  · Drink liquids as directed  Ask how much liquid to drink each day and which liquids are best for you  · Do not smoke  Your baby may not grow well and he or she may weigh less at birth if you smoke during pregnancy  Smoking also increases the risk that your baby will be born too early  Nicotine and other chemicals in cigarettes and cigars can cause lung damage  Ask your healthcare provider for information if you currently smoke and need help to quit  E-cigarettes or smokeless tobacco still contain nicotine   Talk to your healthcare provider before you use these products  · Do not drink alcohol  Alcohol may harm your unborn baby and cause  labor  · Maintain a healthy weight  A healthy weight may prevent  labor  Ask your healthcare provider how much weight you should gain during your pregnancy  Follow up with your doctor as directed:  Write down your questions so you remember to ask them during your visits  © Copyright [employer]  Information is for End User's use only and may not be sold, redistributed or otherwise used for commercial purposes  All illustrations and images included in CareNotes® are the copyrighted property of A D A M , Inc  or [facility] An Win   The above information is an  only  It is not intended as medical advice for individual conditions or treatments  Talk to your doctor, nurse or pharmacist before following any medical regimen to see if it is safe and effective for you  Valuable Online Resource:     [facility] pregnancy essential guide    http://batsheva melo/      On the right side of the screen is a 50 page guide providing valuable information about your entire pregnancy  On the left hand side of the site you will see several other links to great information and resources that [facility] offers     If you click on the tab that says "Pregnancy and Birth Packet" this opens another  guide to labor and delivery information as well as breast feeding information,  care, pediatricians, car seat safety and much more     The [facility] and [address] tab has a virtual tour of the new L&D unit, as well as valuable information about classes that are offered, breast feeding support, support groups and much more  I highly recommend the virtual Breast Feeding class, very informational even if you have breast fed in the past  Check for available dates ! Click around and enjoy all we have to offer!     Please note that all information in regards to locations and visiting hours have not been updated due to 2 Aniya Smith delivery location is [address] [place] [address] Drive @ [facility] [address], [address] [address]

## 2022-03-29 ENCOUNTER — TELEPHONE (OUTPATIENT)
Dept: PERINATAL CARE | Facility: OTHER | Age: 27
End: 2022-03-29

## 2022-03-29 NOTE — TELEPHONE ENCOUNTER
Left message for pt , I did confirm with Dr Stevie Sibley regarding her intermittent FMLA papers and he stated that she would need to go through her OB to have those papers filled out  Any questions I did advise pt to call me back

## 2022-03-29 NOTE — TELEPHONE ENCOUNTER
Pt called again stating she was referred to a fetal cardiologist and now going to [facility]  Pt now needs intermittent leave due to these appts  I advised her to contact the cardiologist office for this leave, but I will work in conjunction with that office to update our forms  Pt will discuss with other provider and call our office if needed  Spoke with pt, reviewed ppw that was submitted, everything was complete, and I advised pt that we have not received any new forms as of today  She will contact the company that is handling her fmla to see what, if anything is needed

## 2022-03-29 NOTE — TELEPHONE ENCOUNTER
Pt had called asking if Dr Bailee Amador pediatric cardiology would be able to fill out intermittent FMLA papers due to her frequent visits at [facility]  Message was given to Dr Bailee Amador due to him being here at the [employer] doing fetal echoes today

## 2022-03-29 NOTE — TELEPHONE ENCOUNTER
Pt calling because she was contacted by the woman at her work that [employer] and was told that we did not complete some info that she needed, pt wondering if we received fax from that woman and can help complete the forms again

## 2022-03-30 NOTE — PROGRESS NOTES
Please refer to the [facility] ultrasound report in Ob Procedures for additional information regarding today's visit

## 2022-03-31 ENCOUNTER — ULTRASOUND (OUTPATIENT)
Dept: PERINATAL CARE | Facility: OTHER | Age: 27
End: 2022-03-31
Payer: COMMERCIAL

## 2022-03-31 ENCOUNTER — TELEPHONE (OUTPATIENT)
Dept: PEDIATRICS CLINIC | Age: 27
End: 2022-03-31

## 2022-03-31 VITALS
BODY MASS INDEX: 34.19 KG/M2 | WEIGHT: 205.2 LBS | HEART RATE: 80 BPM | HEIGHT: 65 IN | DIASTOLIC BLOOD PRESSURE: 72 MMHG | SYSTOLIC BLOOD PRESSURE: 119 MMHG

## 2022-03-31 DIAGNOSIS — O36.8390 FETAL ARRHYTHMIA AFFECTING PREGNANCY, ANTEPARTUM: ICD-10-CM

## 2022-03-31 DIAGNOSIS — O99.213 MATERNAL OBESITY, ANTEPARTUM, THIRD TRIMESTER: ICD-10-CM

## 2022-03-31 DIAGNOSIS — Z3A.31 31 WEEKS GESTATION OF PREGNANCY: ICD-10-CM

## 2022-03-31 DIAGNOSIS — O35.8XX0 ANOMALY OF HEART OF FETUS AFFECTING PREGNANCY, ANTEPARTUM, SINGLE OR UNSPECIFIED FETUS: Primary | ICD-10-CM

## 2022-03-31 PROCEDURE — 76816 OB US FOLLOW-UP PER FETUS: CPT | Performed by: OBSTETRICS & GYNECOLOGY

## 2022-03-31 PROCEDURE — 99214 OFFICE O/P EST MOD 30 MIN: CPT | Performed by: OBSTETRICS & GYNECOLOGY

## 2022-03-31 NOTE — TELEPHONE ENCOUNTER
Left message for patient to call our office back to set up a meet and greet appointment if she doesn't have a pediatrician established already

## 2022-03-31 NOTE — LETTER
April 2, 2022     [facility]  [address]    Patient: Manjinder Garnica   YOB: 1995   Date of Visit: 3/31/2022       Dear Dr Reva Bell: Thank you for referring Kiara Messer to me for evaluation  Below are my notes for this consultation  If you have questions, please do not hesitate to call me  I look forward to following your patient along with you  Sincerely,        Micah Mairno MD        CC: No Recipients  Micah Marino MD  3/30/2022  7:03 PM  Sign when Signing Visit  Please refer to the [facility] ultrasound report in Ob Procedures for additional information regarding today's visit

## 2022-04-01 ENCOUNTER — TELEPHONE (OUTPATIENT)
Dept: OBGYN CLINIC | Facility: CLINIC | Age: 27
End: 2022-04-01

## 2022-04-01 DIAGNOSIS — K64.8 INTERNAL HEMORRHOID: Primary | ICD-10-CM

## 2022-04-01 RX ORDER — HYDROCORTISONE ACETATE 25 MG/1
25 SUPPOSITORY RECTAL 2 TIMES DAILY PRN
Qty: 12 SUPPOSITORY | Refills: 0 | Status: SHIPPED | OUTPATIENT
Start: 2022-04-01 | End: 2022-04-12

## 2022-04-01 NOTE — TELEPHONE ENCOUNTER
She can use anusol -hc suppository for symptomatic hemorrhoid relief  I will send rx to pharmacy on file  I recommend a high fiber diet with adequate hydration and over the counter stool softeners such as colace

## 2022-04-01 NOTE — TELEPHONE ENCOUNTER
OB pt, 32 weeks, called saying she has an internal hemorrhoids and she wants to know if it is anything that she can take  Pt last seen on 3/28/22 by [place]  Please advise! Thanks!

## 2022-04-02 PROBLEM — O99.213 MATERNAL OBESITY, ANTEPARTUM, THIRD TRIMESTER: Status: ACTIVE | Noted: 2022-04-02

## 2022-04-02 PROBLEM — O36.8390 FETAL ARRHYTHMIA AFFECTING PREGNANCY, ANTEPARTUM: Status: ACTIVE | Noted: 2022-04-02

## 2022-04-02 NOTE — PATIENT INSTRUCTIONS
Kick Counts in Pregnancy   WHAT YOU NEED TO KNOW:   Kick counts measure how much your baby is moving in your womb  A kick from your baby can be felt as a twist, turn, swish, roll, or jab  It is common to feel your baby kicking at 26 to 28 weeks of pregnancy  You may feel your baby kick as early as 20 weeks of pregnancy  You may want to start counting at 28 weeks  DISCHARGE INSTRUCTIONS:   Contact your doctor immediately if:   · You feel a change in the number of kicks or movements of your baby  · You feel fewer than 10 kicks within 2 hours  · You have questions or concerns about your baby's movements  Why measure kick counts:  Your baby's movement may provide information about your baby's health  He or she may move less, or not at all, if there are problems  Your baby may move less if he or she is not getting enough oxygen or nutrition from the placenta  Do not smoke while you are pregnant  Smoking decreases the amount of oxygen that gets to your baby  Talk to your healthcare provider if you need help to quit smoking  Tell your healthcare provider as soon as you feel a change in your baby's movements  When to measure kick counts:   · Measure kick counts at the same time every day  · Measure kick counts when your baby is awake and most active  Your baby may be most active in the evening  How to measure kick counts:  Check that your baby is awake before you measure kick counts  You can wake up your baby by lightly pushing on your belly, walking, or drinking something cold  Your healthcare provider may tell you different ways to measure kick counts  You may be told to do the following:  · Use a chart or clock to keep track of the time you start and finish counting  · Sit in a chair or lie on your left side  · Place your hands on the largest part of your belly  · Count until you reach 10 kicks  Write down how much time it takes to count 10 kicks       · It may take 30 minutes to 2 hours to count 10 kicks  It should not take more than 2 hours to count 10 kicks  Follow up with your doctor as directed:  Write down your questions so you remember to ask them during your visits  © Copyright [employer] 2022 Information is for End User's use only and may not be sold, redistributed or otherwise used for commercial purposes  All illustrations and images included in CareNotes® are the copyrighted property of [employer] D A M , Inc  or [facility] [employer]   The above information is an  only  It is not intended as medical advice for individual conditions or treatments  Talk to your doctor, nurse or pharmacist before following any medical regimen to see if it is safe and effective for you

## 2022-04-04 PROBLEM — Z3A.32 32 WEEKS GESTATION OF PREGNANCY: Status: ACTIVE | Noted: 2022-02-14

## 2022-04-04 PROBLEM — O99.210 OBESITY IN PREGNANCY: Status: RESOLVED | Noted: 2021-11-18 | Resolved: 2022-04-04

## 2022-04-05 ENCOUNTER — ULTRASOUND (OUTPATIENT)
Dept: PERINATAL CARE | Facility: OTHER | Age: 27
End: 2022-04-05
Payer: COMMERCIAL

## 2022-04-05 VITALS
BODY MASS INDEX: 34.99 KG/M2 | WEIGHT: 210 LBS | SYSTOLIC BLOOD PRESSURE: 100 MMHG | HEIGHT: 65 IN | DIASTOLIC BLOOD PRESSURE: 67 MMHG | HEART RATE: 78 BPM

## 2022-04-05 DIAGNOSIS — Z3A.32 32 WEEKS GESTATION OF PREGNANCY: Primary | ICD-10-CM

## 2022-04-05 DIAGNOSIS — O36.8390 FETAL ARRHYTHMIA AFFECTING PREGNANCY, ANTEPARTUM: ICD-10-CM

## 2022-04-05 PROCEDURE — 76815 OB US LIMITED FETUS(S): CPT | Performed by: OBSTETRICS & GYNECOLOGY

## 2022-04-05 PROCEDURE — 59025 FETAL NON-STRESS TEST: CPT | Performed by: OBSTETRICS & GYNECOLOGY

## 2022-04-05 NOTE — PATIENT INSTRUCTIONS
Nonstress Test for Pregnancy   WHAT YOU NEED TO KNOW:   What do I need to know about a nonstress test?  A nonstress test measures your baby's heart rate and movements  Nonstress means that no stress will be placed on your baby during the test   How do I prepare for a nonstress test?  Your healthcare provider will talk to you about how to prepare for this test  He or she may tell you to eat and drink plenty of fluids before your test  If you smoke, you may be asked not to smoke within 2 hours before the test  He or she will also tell you which medicines to take or not take on the day of your test   What will happen during a nonstress test?  You may be asked to lie down or recline back for the test on a bed  One or 2 belts with sensors will be placed around your abdomen  Your baby's heart rate will be recorded with a machine  If your baby does not move, your baby may be asleep  Your healthcare provider may make a noise near your abdomen to try to wake your baby  The test usually takes about 20 minutes, but can take longer if your baby needs to be awakened  What do I need to know about the test results? Your baby will be expected to move at least 2 times for a certain amount of time  Your baby's heart rate will be expected to go up by a certain number of beats per minute during movement  If your baby does not move as expected, the test may need to be repeated or you may need other tests  CARE AGREEMENT:   You have the right to help plan your care  Learn about your health condition and how it may be treated  Discuss treatment options with your healthcare providers to decide what care you want to receive  You always have the right to refuse treatment  The above information is an  only  It is not intended as medical advice for individual conditions or treatments  Talk to your doctor, nurse or pharmacist before following any medical regimen to see if it is safe and effective for you    © Copyright [employer] 2022 Information is for [employer] use only and may not be sold, redistributed or otherwise used for commercial purposes  All illustrations and images included in CareNotes® are the copyrighted property of [employer]  or [address] [place] [address] Елена López in Pregnancy   AMBULATORY CARE:   Kick counts  measure how much your baby is moving in your womb  A kick from your baby can be felt as a twist, turn, swish, roll, or jab  It is common to feel your baby kicking at 26 to 28 weeks of pregnancy  You may feel your baby kick as early as 20 weeks of pregnancy  You may want to start counting at 28 weeks  Contact your doctor immediately if:   · You feel a change in the number of kicks or movements of your baby  · You feel fewer than 10 kicks within 2 hours  · You have questions or concerns about your baby's movements  Why measure kick counts:  Your baby's movement may provide information about your baby's health  He or she may move less, or not at all, if there are problems  Your baby may move less if he or she is not getting enough oxygen or nutrition from the placenta  Do not smoke while you are pregnant  Smoking decreases the amount of oxygen that gets to your baby  Talk to your healthcare provider if you need help to quit smoking  Tell your healthcare provider as soon as you feel a change in your baby's movements  When to measure kick counts:   · Measure kick counts at the same time every day  · Measure kick counts when your baby is awake and most active  Your baby may be most active in the evening  How to measure kick counts:  Check that your baby is awake before you measure kick counts  You can wake up your baby by lightly pushing on your belly, walking, or drinking something cold  Your healthcare provider may tell you different ways to measure kick counts  You may be told to do the following:  · Use a chart or clock to keep track of the time you start and finish counting       · Sit in a chair or lie on your left side  · Place your hands on the largest part of your belly  · Count until you reach 10 kicks  Write down how much time it takes to count 10 kicks  · It may take 30 minutes to 2 hours to count 10 kicks  It should not take more than 2 hours to count 10 kicks  Follow up with your doctor as directed:  Write down your questions so you remember to ask them during your visits  © Copyright [employer] 2022 Information is for End User's use only and may not be sold, redistributed or otherwise used for commercial purposes  All illustrations and images included in CareNotes® are the copyrighted property of [employer] M [employer] Inc  or [facility] [employer]   The above information is an  only  It is not intended as medical advice for individual conditions or treatments  Talk to your doctor, nurse or pharmacist before following any medical regimen to see if it is safe and effective for you

## 2022-04-05 NOTE — LETTER
April 5, 2022     Albert Shelley [address] [place] [id number]    Patient: Maru De Luna   YOB: 1995   Date of Visit: 4/5/2022       Dear Ximena Fischer: Thank you for referring Sondra Alexander to me for evaluation  Below are my notes for this consultation  If you have questions, please do not hesitate to call me  I look forward to following your patient along with you  Sincerely,        Daniele Quick MD        CC: No Recipients  Daniele Quick MD  4/5/2022  3:16 PM  Sign when Signing Visit  [facility]: Ms Cecilia Ayala was seen today at 32w4d for NST (found under the pregnancy episode) which I reviewed the RN assessment and agree, and DORY (see ultrasound report under OB procedures tab)  See ultrasound report under "OB Procedures" tab    Please don't hesitate to contact our office with any concerns or questions   -Daniele Quick MD

## 2022-04-05 NOTE — PROGRESS NOTES
[address]: Ms Jessica Conner was seen today at 32w4d for NST (found under the pregnancy episode) which I reviewed the RN assessment and agree, and DORY (see ultrasound report under OB procedures tab)  See ultrasound report under "OB Procedures" tab    Please don't hesitate to contact our office with any concerns or questions   -Leti Granados MD

## 2022-04-05 NOTE — LETTER
NST sleeve cover sheet    Patient name: Lucy Lott  : 1995  MRN: [id number]    KITTY: Estimated Date of Delivery: 22        Obstetrician: _________sloga_______________    Reason(s) for testing:  ________________________Fetal Cardiac Anomaly ( PAC's)__________________      Testing frequency:    ___ 2x/wk  ___x 1x/wk  ___ Dopplers  ___ BPP?       Last growth scan: __________________________________________

## 2022-04-05 NOTE — PROGRESS NOTES
NST procedure and expected outcome explained to patient  Daily fetal kick count discussed with handout given  Patient verbalized understanding of all and was receptive      Madie Walker RN

## 2022-04-07 PROBLEM — Z86.16 PERSONAL HISTORY OF COVID-19: Status: ACTIVE | Noted: 2022-01-18

## 2022-04-07 NOTE — PATIENT INSTRUCTIONS
Perineal Massage    Perineal massage is recommended starting after 34 weeks in order to reduce risks of perineal tearing during childbirth  You have been provided and instructional sheet in your yellow 28 week prenatal packet  Pregnancy at 31 to 34 Weeks   AMBULATORY CARE:   Changes happening with your body: You may continue to have symptoms such as shortness of breath, heartburn, contractions, or swelling of your ankles and feet  You may be gaining about 1 pound a week now  Seek care immediately if:   · You develop a severe headache that does not go away  · You have new or increased vision changes, such as blurred or spotted vision  · You have new or increased swelling in your face or hands  · You have vaginal spotting or bleeding  · Your water broke or you feel warm water gushing or trickling from your vagina  Call your obstetrician if:   · You have more than 5 contractions in 1 hour  · You notice any changes in your baby's movements  · You have abdominal cramps, pressure, or tightening  · You have a change in vaginal discharge  · You have chills or a fever  · You have vaginal itching, burning, or pain  · You have yellow, green, white, or foul-smelling vaginal discharge  · You have pain or burning when you urinate, less urine than usual, or pink or bloody urine  · You have questions or concerns about your condition or care  How to care for yourself at this stage of your pregnancy:       · Eat a variety of healthy foods  Healthy foods include fruits, vegetables, whole-grain breads, low-fat dairy foods, beans, lean meats, and fish  Drink liquids as directed  Ask how much liquid to drink each day and which liquids are best for you  Limit caffeine to less than 200 milligrams each day  Limit your intake of fish to 2 servings each week  Choose fish low in mercury such as canned light tuna, shrimp, salmon, cod, or tilapia   Do not  eat fish high in mercury such as swordfish, tilefish, tamiko mackerel, and shark  · Manage heartburn  by eating 4 or 5 small meals each day instead of large meals  Avoid spicy food  · Manage swelling  by lying down and putting your feet up  · Take prenatal vitamins as directed  Your need for certain vitamins and minerals, such as folic acid, increases during pregnancy  Prenatal vitamins provide some of the extra vitamins and minerals you need  Prenatal vitamins may also help to decrease the risk of certain birth defects  · Talk to your healthcare provider about exercise  Moderate exercise can help you stay fit  Your healthcare provider will help you plan an exercise program that is safe for you during pregnancy  · Do not smoke  Smoking increases your risk of a miscarriage and other health problems during your pregnancy  Smoking can cause your baby to be born too early or weigh less at birth  Ask your healthcare provider for information if you need help quitting  · Do not drink alcohol  Alcohol passes from your body to your baby through the placenta  It can affect your baby's brain development and cause fetal alcohol syndrome (FAS)  FAS is a group of conditions that causes mental, behavior, and growth problems  · Talk to your healthcare provider before you take any medicines  Many medicines may harm your baby if you take them when you are pregnant  Do not take any medicines, vitamins, herbs, or supplements without first talking to your healthcare provider  Never use illegal or street drugs (such as marijuana or cocaine) while you are pregnant  Safety tips during pregnancy:   · Avoid hot tubs and saunas  Do not use a hot tub or sauna while you are pregnant, especially during your first trimester  Hot tubs and saunas may raise your baby's temperature and increase the risk of birth defects  · Avoid toxoplasmosis  This is an infection caused by eating raw meat or being around infected cat feces   It can cause birth defects, miscarriages, and other problems  Wash your hands after you touch raw meat  Make sure any meat is well-cooked before you eat it  Avoid raw eggs and unpasteurized milk  Use gloves or ask someone else to clean your cat's litter box while you are pregnant  Changes happening with your baby:  By 34 weeks, your baby may weigh more than 5 pounds  Your baby will be about 12 ½ inches long from the top of the head to the rump (baby's bottom)  Your baby is gaining about ½ pound a week  Your baby's eyes open and close now  Your baby's kicks and movements are more forceful at this time  What you need to know about prenatal care: Your healthcare provider will check your blood pressure and weight  You may also need the following:  · A urine test  may also be done to check for sugar and protein  These can be signs of gestational diabetes or infection  Protein in your urine may also be a sign of preeclampsia  Preeclampsia is a condition that can develop during week 20 or later of your pregnancy  It causes high blood pressure, and it can cause problems with your kidneys and other organs  · A gestational diabetes screen  may be done  Your healthcare provider may order either a 1-step or 2-step oral glucose tolerance test (OGTT)  ? 1-step OGTT:  Your blood sugar level will be tested after you have not eaten for 8 hours (fasting)  You will then be given a glucose drink  Your level will be tested again 1 hour and 2 hours after you finish the drink  ? 2-step OGTT:  You do not have to fast for the first part of the test  You will have the glucose drink at any time of day  Your blood sugar level will be checked 1 hour later  If your blood sugar is higher than a certain level, another test will be ordered  You will fast and your blood sugar level will be tested  You will have the glucose drink  Your blood will be tested again 1 hour, 2 hours, and 3 hours after you finish the glucose drink      · A Tdap vaccine  may be recommended by your healthcare provider  · Fundal height  is a measurement of your uterus to check your baby's growth  This number is usually the same as the number of weeks that you have been pregnant  Your healthcare provider may also check your baby's position  · Your baby's heart rate  will be checked  Follow up with your obstetrician as directed:  Write down your questions so you remember to ask them during your visits  © Copyright [employer] 2022 Information is for End User's use only and may not be sold, redistributed or otherwise used for commercial purposes  All illustrations and images included in CareNotes® are the copyrighted property of A D A M , Inc  or [employer]   The above information is an  only  It is not intended as medical advice for individual conditions or treatments  Talk to your doctor, nurse or pharmacist before following any medical regimen to see if it is safe and effective for you  Arthur Newberry Contractions   WHAT YOU NEED TO KNOW:   What are Arthur Newberry contractions? [place]esto Newberry contractions are tightening and squeezing of the muscles of your uterus (womb) during pregnancy  The uterine muscles control the uterus  Arthur Newberry contractions stop on their own  They are not true labor contractions and do not cause your cervix (opening to your uterus) to dilate (open)  What causes Arthur Newberry contractions? Ernesto Newberry contractions may get your body ready for true labor  They may increase blood flow to the placenta  The placenta forms during pregnancy and provides oxygen and nutrition to your unborn baby  The placenta also removes waste products from the unborn baby  The following may also cause Arthur Newberry contractions to happen:  · Exercise    · Dehydration    · Sex    · A full bladder    What are the signs and symptoms of Ernesto Newberry contractions?    · Pain or discomfort in your groin or lower abdomen that comes and goes    · Your contractions are short, and do not last longer each time    · Your contractions do not get closer together each time    · Your contractions do not get stronger or more painful each time    · Your contractions stop when you change your position or rest    How are [place] contractions diagnosed? Your healthcare provider may examine your cervix to look for changes such as dilation or fluid  He or she may ask you how long your contractions last, how often they happen, and where you feel them  Use a clock or watch to time contractions  Write down how much time passes between each contraction and how long each contraction lasts  Your healthcare provider may watch you for several hours to make sure that true labor does not begin  How are [place] contractions treated? Your healthcare provider may give you pain medicine or medicine to help you relax  If you are dehydrated, he or she may give you fluids through an IV or have you drink liquids  How can I manage my symptoms? · Change your activity or your position  when you feel contractions begin  Walk if you have been lying or sitting  Lie down if you have been standing or walking  True labor will not stop by changing your position or activity  · Take a warm bath  to relax your body  · Drink more liquids  to prevent dehydration  Ask how much liquid to drink each day and which liquids are best for you  · Practice your labor breathing  to decrease your discomfort  This may help you get ready for true labor  Take slow, deep breaths, or fast, short breaths  Ask your healthcare provider how to practice labor breathing  When should I seek immediate care? · You have bleeding from your vagina  · You have fluid leaking from your vagina that does not stop  · You feel a gush of fluid from your vagina  · Your contractions happen every 5 minutes or sooner, and last for more than 60 seconds      · Your contractions begin to feel stronger or more painful  · You feel a change in your baby's movement, or you feel fewer than 6 to 10 movements in an hour  When should I call my doctor? · You have a fever  · You have questions or concerns about your condition or care  CARE AGREEMENT:   You have the right to help plan your care  Learn about your health condition and how it may be treated  Discuss treatment options with your healthcare providers to decide what care you want to receive  You always have the right to refuse treatment  The above information is an  only  It is not intended as medical advice for individual conditions or treatments  Talk to your doctor, nurse or pharmacist before following any medical regimen to see if it is safe and effective for you  © Copyright [employer] 2022 Information is for End User's use only and may not be sold, redistributed or otherwise used for commercial purposes  All illustrations and images included in CareNotes® are the copyrighted property of A D A M , Inc  or Alma Win     Early Labor Signs   GENERAL INFORMATION:   What are early labor signs? Early labor signs are changes in your body that allow your baby to pass through your birth canal   What are the signs and symptoms of early labor? · Lightening  occurs when your baby drops inside your pelvis  You may feel increased pressure in your pelvis  This may happen a few weeks to a few hours before your labor begins  · Contractions  are cramps and tightening that occur in your uterus to help move the baby through your birth canal  Contractions occur regularly and more often each time  Each one lasts about 30 to 70 seconds, and gets stronger until you deliver your baby  Contractions do not go away with movement  The pain starts in your lower back and moves to your abdomen  · Effacement  occurs when your cervix softens and thins, so it can easily open for the baby   Your caregiver will examine your cervix for effacement  · Dilation  is widening of your cervix, also for the baby's passage  Your caregiver will examine your cervix for dilation  Your cervix will be fully opened and ready for delivery when it is dilated to 10 centimeters  · Increased discharge  from your vagina may occur  It may be pink, clear, or slightly bloody  This discharge may also be called bloody show  Bloody show is a mucus plug that forms and blocks your cervix during pregnancy  · Rupture of membranes  is a sudden release of clear fluid from your vagina  It is also known as when your water breaks  Your caregiver may need to break your water if it does not break on its own  What is false labor? You may have false labor signs, which are also called [place] contractions  False labor is common and may happen several weeks or days before your actual labor  The contractions are not regular, and do not get closer together  The pain is usually mild, does not worsen, and is felt only in front  [place] contractions may happen later in the day, and stop after you change position, walk, or rest   When should I contact my caregiver? · You have pain in your lower back or abdomen  · You have bloody mucus or show  · You have questions or concerns about your condition or care  When should I seek immediate care or call 911? · You have regular, painful contractions that are less than 5 minutes apart and last 30 to 70 seconds each  · You have heavy vaginal bleeding  · You have a constant trickle or sudden gush of clear fluid from your vagina  · You notice a sudden decrease in your baby's movement  CARE AGREEMENT:   You have the right to help plan your care  Learn about your health condition and how it may be treated  Discuss treatment options with your caregivers to decide what care you want to receive  You always have the right to refuse treatment  The above information is an  only   It is not intended as medical advice for individual conditions or treatments  Talk to your doctor, nurse or pharmacist before following any medical regimen to see if it is safe and effective for you  2014 [address] is for End User's use only and may not be sold, redistributed or otherwise used for commercial purposes  All illustrations and images included in CareNotes® are the copyrighted property of [employer]  or Nicholas Owens  Valuable Online Resource:    [facility] pregnancy essential guide    http://batsheva melo/      On the right side of the screen is a 50 page guide providing valuable information about your entire pregnancy  On the left hand side of the site you will see several other links to great information and resources that [facility] offers     If you click on the tab that says "Pregnancy and Birth Packet" this opens another  guide to labor and delivery information as well as breast feeding information,  care, pediatricians, car seat safety and much more     The [facility] and [address] tab has a virtual tour of the new L&D unit, as well as valuable information about classes that are offered, breast feeding support, support groups and much more  I highly recommend the virtual Breast Feeding class, very informational even if you have breast fed in the past  Check for available dates ! Click around and enjoy all we have to offer!     Please note that all information in regards to locations and visiting hours have not been updated due to 2 Aniya Smith delivery location is [address] [place] [address] @ [facility], [address] [address]

## 2022-04-07 NOTE — ASSESSMENT & PLAN NOTE
Anish Velasquez is a 32 y o    33w3d who presents for routine PNV  28 week labs reviewed: WNL  Passed GTT  Hgb 12 8   TWG 9 526 kg (21 lb)   Denies OB complaints  Good fetal movement  Denies contractions, cramping, leakage of fluid or vaginal bleeding  S/p Tdap vaccine  Reviewed  labor precautions and FKCs     Advised to start Perineal massage at 34-36 weeks   Pregnancy Essential guide and Baby and Me web site recommended

## 2022-04-08 NOTE — PROGRESS NOTES
Pt is here for prenatal ob visit today  No question's or concerns at this time  GA: 33w3d  KITTY: 2022    Urine: Protein Negative / Glucose Negative  No LOF, VB, Contractions  +FM  28 wk labs completed  S/p tdap vaccine given on 3/15/2022

## 2022-04-11 ENCOUNTER — ROUTINE PRENATAL (OUTPATIENT)
Dept: OBGYN CLINIC | Age: 27
End: 2022-04-11

## 2022-04-11 VITALS
WEIGHT: 209.4 LBS | DIASTOLIC BLOOD PRESSURE: 68 MMHG | HEIGHT: 65 IN | BODY MASS INDEX: 34.89 KG/M2 | SYSTOLIC BLOOD PRESSURE: 100 MMHG

## 2022-04-11 DIAGNOSIS — O09.93 SUPERVISION OF HIGH-RISK PREGNANCY, THIRD TRIMESTER: Primary | ICD-10-CM

## 2022-04-11 DIAGNOSIS — O35.8XX0 ANOMALY OF HEART OF FETUS AFFECTING PREGNANCY, ANTEPARTUM, SINGLE OR UNSPECIFIED FETUS: ICD-10-CM

## 2022-04-11 DIAGNOSIS — Z3A.33 33 WEEKS GESTATION OF PREGNANCY: ICD-10-CM

## 2022-04-11 DIAGNOSIS — O36.8390 FETAL ARRHYTHMIA AFFECTING PREGNANCY, ANTEPARTUM: ICD-10-CM

## 2022-04-11 PROBLEM — Z34.02 ENCOUNTER FOR SUPERVISION OF NORMAL FIRST PREGNANCY IN SECOND TRIMESTER: Status: RESOLVED | Noted: 2021-11-18 | Resolved: 2022-04-11

## 2022-04-11 LAB
SL AMB  POCT GLUCOSE, UA: NEGATIVE
SL AMB POCT URINE PROTEIN: NEGATIVE

## 2022-04-11 PROCEDURE — PNV

## 2022-04-11 NOTE — PROGRESS NOTES
Problem   33 Weeks Gestation of Pregnancy    Blood Type: A positive  Antibody negative  Pap 2021  Normal  GC/CT negative  PN1 Labs: WNL  Hgb: 14 0  28 Week Labs: WNL  Passed GTT  Hgb 12 8             Encounter for Supervision of Normal First Pregnancy in Second Trimester (Resolved)     33 weeks gestation of pregnancy  Adelina Severe is a 32 y o  Rajwinder Awad  33w3d who presents for routine PNV  28 week labs reviewed: WNL  Passed GTT  Hgb 12 8   TWG 9 526 kg (21 lb)   Denies OB complaints  Good fetal movement  Denies contractions, cramping, leakage of fluid or vaginal bleeding  S/p Tdap vaccine  Reviewed  labor precautions and FKCs     Advised to start Perineal massage at 34-36 weeks   Pregnancy Essential guide and Baby and Me web site recommended

## 2022-04-12 ENCOUNTER — ULTRASOUND (OUTPATIENT)
Dept: PERINATAL CARE | Facility: OTHER | Age: 27
End: 2022-04-12
Payer: COMMERCIAL

## 2022-04-12 VITALS
BODY MASS INDEX: 34.75 KG/M2 | SYSTOLIC BLOOD PRESSURE: 111 MMHG | DIASTOLIC BLOOD PRESSURE: 69 MMHG | HEART RATE: 105 BPM | WEIGHT: 208.6 LBS | HEIGHT: 65 IN

## 2022-04-12 DIAGNOSIS — O35.8XX0 ANOMALY OF HEART OF FETUS AFFECTING PREGNANCY, ANTEPARTUM, SINGLE OR UNSPECIFIED FETUS: ICD-10-CM

## 2022-04-12 DIAGNOSIS — O41.03X0 OLIGOHYDRAMNIOS ANTEPARTUM, THIRD TRIMESTER, NOT APPLICABLE OR UNSPECIFIED FETUS: Primary | ICD-10-CM

## 2022-04-12 DIAGNOSIS — O36.8390 FETAL ARRHYTHMIA AFFECTING PREGNANCY, ANTEPARTUM: ICD-10-CM

## 2022-04-12 DIAGNOSIS — Z3A.33 33 WEEKS GESTATION OF PREGNANCY: ICD-10-CM

## 2022-04-12 PROCEDURE — 76815 OB US LIMITED FETUS(S): CPT | Performed by: OBSTETRICS & GYNECOLOGY

## 2022-04-12 PROCEDURE — 76820 UMBILICAL ARTERY ECHO: CPT | Performed by: OBSTETRICS & GYNECOLOGY

## 2022-04-12 PROCEDURE — 59025 FETAL NON-STRESS TEST: CPT | Performed by: OBSTETRICS & GYNECOLOGY

## 2022-04-12 PROCEDURE — 99214 OFFICE O/P EST MOD 30 MIN: CPT | Performed by: OBSTETRICS & GYNECOLOGY

## 2022-04-12 RX ORDER — BETAMETHASONE SODIUM PHOSPHATE AND BETAMETHASONE ACETATE 3; 3 MG/ML; MG/ML
12 INJECTION, SUSPENSION INTRA-ARTICULAR; INTRALESIONAL; INTRAMUSCULAR; SOFT TISSUE EVERY 24 HOURS
Status: SHIPPED | OUTPATIENT
Start: 2022-04-12 | End: 2022-04-14

## 2022-04-12 NOTE — LETTER
April 12, 2022     MD Dany Vargas [address]    Patient: Shaye Metz   YOB: 1995   Date of Visit: 4/12/2022       Dear Dr Linward Angelucci: Thank you for referring Manny Bain to me for evaluation  Below are my notes for this consultation  If you have questions, please do not hesitate to call me  I look forward to following your patient along with you  Sincerely,        Hank Machado MD        CC: No Recipients  Hank Machado MD  4/12/2022  7:16 PM  Sign when Signing Visit  DORY is between 4-5  Umbilical  Dopplers are normal   Large vertical pocket is more than 2  Etiology for low fluid is unknown  She denies leaking of fluid  Recommend steroids and recommend increasing her nonstress test to twice weekly with DORY and Umbilical dopplers weekly  Encouraged po hydration  Should her DORY normalize then can resume prior schedule for testing  Pre visit time reviewing her records   10 minutes  Face to face time 10 minutes  Post visit time on documentation of note, updating her problem list, adding orders and prescriptions 10 minutes  Procedures that were completed today were charged separately  The level of decision making was moderate complexity    MD Ghassan Nolasco CRNP  4/12/2022  9:30 AM  Sign when Signing Visit  Patient seen seen today for NST/DORY/doppler  NST reactive    DORY and doppler are normal

## 2022-04-12 NOTE — PROGRESS NOTES
DORY is between 4-5  Umbilical  Dopplers are normal   Large vertical pocket is more than 2  Etiology for low fluid is unknown  She denies leaking of fluid  Recommend steroids and recommend increasing her nonstress test to twice weekly with DORY and Umbilical dopplers weekly  Encouraged po hydration  Should her DORY normalize then can resume prior schedule for testing  Pre visit time reviewing her records   10 minutes  Face to face time 10 minutes  Post visit time on documentation of note, updating her problem list, adding orders and prescriptions 10 minutes  Procedures that were completed today were charged separately     The level of decision making was moderate complexity    Anabelle Estrada MD

## 2022-04-12 NOTE — PATIENT INSTRUCTIONS
Kick Counts in Pregnancy   AMBULATORY CARE:   Kick counts  measure how much your baby is moving in your womb  A kick from your baby can be felt as a twist, turn, swish, roll, or jab  It is common to feel your baby kicking at 26 to 28 weeks of pregnancy  You may feel your baby kick as early as 20 weeks of pregnancy  You may want to start counting at 28 weeks  Contact your doctor immediately if:   · You feel a change in the number of kicks or movements of your baby  · You feel fewer than 10 kicks within 2 hours  · You have questions or concerns about your baby's movements  Why measure kick counts:  Your baby's movement may provide information about your baby's health  He or she may move less, or not at all, if there are problems  Your baby may move less if he or she is not getting enough oxygen or nutrition from the placenta  Do not smoke while you are pregnant  Smoking decreases the amount of oxygen that gets to your baby  Talk to your healthcare provider if you need help to quit smoking  Tell your healthcare provider as soon as you feel a change in your baby's movements  When to measure kick counts:   · Measure kick counts at the same time every day  · Measure kick counts when your baby is awake and most active  Your baby may be most active in the evening  How to measure kick counts:  Check that your baby is awake before you measure kick counts  You can wake up your baby by lightly pushing on your belly, walking, or drinking something cold  Your healthcare provider may tell you different ways to measure kick counts  You may be told to do the following:  · Use a chart or clock to keep track of the time you start and finish counting  · Sit in a chair or lie on your left side  · Place your hands on the largest part of your belly  · Count until you reach 10 kicks  Write down how much time it takes to count 10 kicks  · It may take 30 minutes to 2 hours to count 10 kicks   It should not take more than 2 hours to count 10 kicks  Follow up with your doctor as directed:  Write down your questions so you remember to ask them during your visits  © Copyright [address] 2022 Information is for End User's use only and may not be sold, redistributed or otherwise used for commercial purposes  All illustrations and images included in CareNotes® are the copyrighted property of A D A M , Inc  or [facility]   The above information is an  only  It is not intended as medical advice for individual conditions or treatments  Talk to your doctor, nurse or pharmacist before following any medical regimen to see if it is safe and effective for you

## 2022-04-13 ENCOUNTER — HOSPITAL ENCOUNTER (OUTPATIENT)
Facility: HOSPITAL | Age: 27
Discharge: HOME | End: 2022-04-13
Attending: OBSTETRICS & GYNECOLOGY | Admitting: OBSTETRICS & GYNECOLOGY
Payer: COMMERCIAL

## 2022-04-13 PROCEDURE — 96372 THER/PROPH/DIAG INJ SC/IM: CPT

## 2022-04-13 RX ORDER — BETAMETHASONE SODIUM PHOSPHATE AND BETAMETHASONE ACETATE 3; 3 MG/ML; MG/ML
12 INJECTION, SUSPENSION INTRA-ARTICULAR; INTRALESIONAL; INTRAMUSCULAR; SOFT TISSUE ONCE
Status: COMPLETED | OUTPATIENT
Start: 2022-04-13 | End: 2022-04-13

## 2022-04-13 RX ADMIN — BETAMETHASONE SODIUM PHOSPHATE AND BETAMETHASONE ACETATE 12 MG: 3; 3 INJECTION, SUSPENSION INTRA-ARTICULAR; INTRALESIONAL; INTRAMUSCULAR at 13:17

## 2022-04-14 ENCOUNTER — CLINICAL SUPPORT (OUTPATIENT)
Dept: PERINATAL CARE | Facility: OTHER | Age: 27
End: 2022-04-14
Payer: COMMERCIAL

## 2022-04-14 ENCOUNTER — ROUTINE PRENATAL (OUTPATIENT)
Dept: PERINATAL CARE | Facility: OTHER | Age: 27
End: 2022-04-14
Payer: COMMERCIAL

## 2022-04-14 VITALS
BODY MASS INDEX: 34.55 KG/M2 | HEIGHT: 65 IN | DIASTOLIC BLOOD PRESSURE: 60 MMHG | SYSTOLIC BLOOD PRESSURE: 96 MMHG | WEIGHT: 207.4 LBS | HEART RATE: 98 BPM

## 2022-04-14 DIAGNOSIS — Z3A.33 33 WEEKS GESTATION OF PREGNANCY: Primary | ICD-10-CM

## 2022-04-14 DIAGNOSIS — O41.03X0 OLIGOHYDRAMNIOS ANTEPARTUM, THIRD TRIMESTER, NOT APPLICABLE OR UNSPECIFIED FETUS: ICD-10-CM

## 2022-04-14 DIAGNOSIS — O41.03X0 OLIGOHYDRAMNIOS IN SINGLETON PREGNANCY IN THIRD TRIMESTER: ICD-10-CM

## 2022-04-14 PROCEDURE — 59025 FETAL NON-STRESS TEST: CPT | Performed by: OBSTETRICS & GYNECOLOGY

## 2022-04-14 PROCEDURE — 96372 THER/PROPH/DIAG INJ SC/IM: CPT

## 2022-04-14 PROCEDURE — NC001 PR NO CHARGE

## 2022-04-14 PROCEDURE — 76815 OB US LIMITED FETUS(S): CPT | Performed by: OBSTETRICS & GYNECOLOGY

## 2022-04-14 RX ADMIN — BETAMETHASONE SODIUM PHOSPHATE AND BETAMETHASONE ACETATE 12 MG: 3; 3 INJECTION, SUSPENSION INTRA-ARTICULAR; INTRALESIONAL; INTRAMUSCULAR; SOFT TISSUE at 14:21

## 2022-04-14 NOTE — PROGRESS NOTES
NST was done today  Pt  Reported active fetal movement at home between visit  Daily fetal kick count reviewed and emphasized  Patient verbalized understanding of all and was receptive  Second dose of Betamethasone given today    Abiel Machado RN

## 2022-04-19 ENCOUNTER — HOSPITAL ENCOUNTER (OUTPATIENT)
Facility: HOSPITAL | Age: 27
Discharge: HOME | End: 2022-04-19
Attending: OBSTETRICS & GYNECOLOGY | Admitting: OBSTETRICS & GYNECOLOGY
Payer: COMMERCIAL

## 2022-04-19 ENCOUNTER — ULTRASOUND (OUTPATIENT)
Dept: PERINATAL CARE | Facility: OTHER | Age: 27
End: 2022-04-19
Payer: COMMERCIAL

## 2022-04-19 VITALS
BODY MASS INDEX: 34.59 KG/M2 | WEIGHT: 207.6 LBS | DIASTOLIC BLOOD PRESSURE: 74 MMHG | HEIGHT: 65 IN | HEART RATE: 88 BPM | SYSTOLIC BLOOD PRESSURE: 109 MMHG

## 2022-04-19 VITALS
TEMPERATURE: 98.5 F | DIASTOLIC BLOOD PRESSURE: 65 MMHG | RESPIRATION RATE: 18 BRPM | HEART RATE: 90 BPM | SYSTOLIC BLOOD PRESSURE: 108 MMHG

## 2022-04-19 DIAGNOSIS — Z3A.34 34 WEEKS GESTATION OF PREGNANCY: Primary | ICD-10-CM

## 2022-04-19 DIAGNOSIS — O35.8XX0 ANOMALY OF HEART OF FETUS AFFECTING PREGNANCY, ANTEPARTUM, SINGLE OR UNSPECIFIED FETUS: ICD-10-CM

## 2022-04-19 DIAGNOSIS — O41.00X0 OLIGOHYDRAMNIOS, ANTEPARTUM, SINGLE OR UNSPECIFIED FETUS: ICD-10-CM

## 2022-04-19 PROCEDURE — NC001 PR NO CHARGE: Performed by: OBSTETRICS & GYNECOLOGY

## 2022-04-19 PROCEDURE — 76815 OB US LIMITED FETUS(S): CPT | Performed by: OBSTETRICS & GYNECOLOGY

## 2022-04-19 PROCEDURE — 99213 OFFICE O/P EST LOW 20 MIN: CPT

## 2022-04-19 PROCEDURE — 59025 FETAL NON-STRESS TEST: CPT | Performed by: OBSTETRICS & GYNECOLOGY

## 2022-04-19 NOTE — DISCHARGE INSTRUCTIONS
Oligohydramnios   AMBULATORY CARE:   Oligohydramnios  is a condition that causes you to have too little amniotic fluid during pregnancy  This fluid surrounds your unborn baby in the womb  Oligohydramnios can happen at any time during the pregnancy  It is most common in the third trimester  Why amniotic fluid is important during pregnancy:  Amniotic fluid helps your baby grow and develop normally  The fluid does the following during pregnancy:  · Protects your baby from injury    · Protects the umbilical cord and keeps it from being pinched    · Helps your baby's lungs develop, and helps him or her exercise his or her muscles and digestive system    · Keeps your baby's temperature regular and protected from infection    · Prevents contractions from starting early    Common signs and symptoms of oligohydramnios:   · Less amniotic fluid than expected for the trimester    · Smaller uterus size than expected for the trimester    · Smaller abdomen size than expected for the trimester    · Slow heartbeat of the baby    · Baby is easy to feel when a healthcare provider touches the mother's abdomen    · Fewer movements than usual from the baby    · Amniotic fluid leaking from the mother's vagina    Seek care immediately if:   · You have clear fluid leaking from your vagina  · You have heavy bleeding or any bleeding from your vagina for more than 24 hours  · You have vision changes or problems, such as blurred vision  Contact your healthcare provider if:   · Your baby is moving less than usual     · You have a fever or chills  · You have contractions before you are due  · You have cramps, pressure in your abdomen, or a low backache  · You have questions or concerns about your condition or care  Treatment  may include any of the following:  · Delivery  may be recommended if your baby is close to being full term  · An amnioinfusion  is a procedure to inject fluid into the womb   The fluid may help take pressure off the umbilical cord  During labor, your healthcare provider puts a catheter (thin tube) through your cervix  Fluid is put through the catheter and into the womb  Risks of oligohydramnios:  Your baby's lungs may not develop correctly  He or she may be born smaller than expected  The umbilical cord may be pinched  This may prevent oxygen from getting to his or her brain and can cause brain damage  Your baby may breathe in meconium (his or her first bowel movement) during delivery  This can be life-threatening  Meconium may stain the amniotic fluid  He or she may be born with clubfoot, or the bridge of his or her nose may be flattened  You may need to have a  section ()  You may have a miscarriage or stillbirth  Care for yourself until delivery:   · Drink more water  to stay hydrated  Ask your healthcare provider how much liquid to drink each day  · Bed rest  may be needed if you are not ready to deliver  Your healthcare provider may recommend bed rest for several weeks of your pregnancy  Lower your risk for oligohydramnios in a future pregnancy:   · Do not smoke  Nicotine in cigarettes increases the risk for PROM or problems with the placenta  Nicotine also causes low birth weight and other medical problems for the baby  Do not use e-cigarettes or smokeless tobacco in place of cigarettes or to help you quit  They still contain nicotine  Ask your healthcare provider for information if you currently smoke and need help quitting  · Eat a variety of healthy foods  Healthy foods include fruits, vegetables, low-fat dairy products, lean meats, and beans  Healthy foods can help you gain a healthy amount of weight during pregnancy, and prevent diabetes  · Take prenatal vitamins as directed  The vitamins should contain at least 4,000 micrograms of folic acid  Folic acid helps prevent birth defects such as spina bifida   Your healthcare provider can help you choose a prenatal vitamin that is right for you  · Drink liquids as directed  More liquid can help prevent dehydration and high blood pressure  Liquids can help prevent PROM  PROM can cause fluid to leak out of the amniotic sac  · Control diabetes or other medical conditions  Diabetes can cause problems for your baby, such as too much weight gain  If you have diabetes, work with your healthcare provider to manage your blood sugar levels before and during your next pregnancy  Follow up with your healthcare provider as directed: You may need tests every 1 to 3 weeks  Go to all follow-up visits  Write down your questions so you remember to ask them during your visits  © Copyright [employer] 2022 Information is for End User's use only and may not be sold, redistributed or otherwise used for commercial purposes  All illustrations and images included in CareNotes® are the copyrighted property of [employer]  or Alma Diego  The above information is an  only  It is not intended as medical advice for individual conditions or treatments  Talk to your doctor, nurse or pharmacist before following any medical regimen to see if it is safe and effective for you

## 2022-04-19 NOTE — LETTER
April 19, 2022     Lorraine Mcgee, [address] [facility]  [address]  [address] [facility] [place]    Patient: Michael Zeng   YOB: 1995   Date of Visit: 4/19/2022       Dear Ms Chyna Montelongo: Thank you for referring Jani Lugo to me for evaluation  Below are my notes for this consultation  If you have questions, please do not hesitate to call me  I look forward to following your patient along with you  Sincerely,        Leonides Marsh MD        CC: MD Leonides Davies MD  4/19/2022  4:15 PM  Sign when Signing Visit  A fetal ultrasound and NST were completed  See Ob procedures in Epic for an interpretation and recommendations  Do not hesitate to contact us in [facility] if you have questions  Nickie Mckay MD, [address] [facility]  Maternal Fetal Medicine

## 2022-04-19 NOTE — PROGRESS NOTES
NST was done today  Pt  Reported active fetal movement at home between visit  Daily fetal kick count reviewed and emphasized  Patient verbalized understanding of all and was receptive      Olamide Whitman RN

## 2022-04-19 NOTE — PROGRESS NOTES
L&D Triage Note - OB/GYN  Amy Bran 32 y o  female MRN: [id number]  Unit/Bed#: LD TRIAGE  Encounter: [id number]      Assessment:  32 y o   at 34w4d here for r/o PPROM    Plan:  1  R/o PPROM  - History of Oligohydramnios  Most recent DORY on  was 8 6cm but was found to be 5 6cm today in office  Sent for further evaluation   - No Sudden release of fluids, no VB, no contractions, no other symptoms   - Speculum exam negative for pooling, Nitrazine negative, negative ferning   - FHT category I  - Discharge for outpatient follow up with already scheduled 2x week appointments given low amniotic fluid       ______________________________________________________________________      Chief Compliant: Rule out Premature  Rupture of Membranes  TIME:   Subjective:  32 y o   at 34w4d presenting with concern for  premature rupture membranes  Patient history oligohydramnios completed betamethasone x2 on 2022  She was seen to have an DORY of 4 9cm on  which corrected to 8 6 on   Her DORY on ultrasound today at [facility] was 5 6cm  She has continued to feel normal fetal movement, denies vaginal bleeding or increased vaginal discharge  No HA, No SOB, No CP, No visual change  Last intercourse last night  No cramping  Review of Systems   Constitutional: Negative for activity change, chills, fatigue and fever  HENT: Negative for congestion, ear pain, hearing loss, rhinorrhea, sore throat and trouble swallowing  Eyes: Negative for pain and visual disturbance  Respiratory: Negative for cough and shortness of breath  Cardiovascular: Negative for chest pain and palpitations  Gastrointestinal: Negative for constipation, diarrhea, nausea and vomiting  Endocrine: Negative for polyuria  Genitourinary: Positive for vaginal discharge (mildly increased wet area around the normal amount of discharge on underware)   Negative for dysuria, hematuria and vaginal bleeding  Musculoskeletal: Negative for arthralgias and myalgias  Neurological: Positive for light-headedness (hx soft blood pressures, not worse than baseline)  Negative for dizziness and headaches  Objective:  Vitals:    04/19/22 1821   BP: 108/65   Pulse: 90   Resp: 18   Temp: 98 5 °F (36 9 °C)     Physical Exam  Constitutional:       General: She is not in acute distress  Appearance: Normal appearance  She is not ill-appearing or toxic-appearing  HENT:      Head: Normocephalic and atraumatic  Nose: Nose normal       Mouth/Throat:      Mouth: Mucous membranes are moist    Eyes:      Pupils: Pupils are equal, round, and reactive to light  Cardiovascular:      Rate and Rhythm: Normal rate and regular rhythm  Heart sounds: Normal heart sounds  Pulmonary:      Effort: Pulmonary effort is normal       Breath sounds: Normal breath sounds  Abdominal:      Palpations: Abdomen is soft  Tenderness: There is no abdominal tenderness  Comments: Gravid abdomin   Musculoskeletal:         General: Normal range of motion  Cervical back: Normal range of motion  Right lower leg: No edema  Left lower leg: No edema  Skin:     General: Skin is warm and dry  Neurological:      Mental Status: She is alert and oriented to person, place, and time     Psychiatric:         Mood and Affect: Mood normal          Behavior: Behavior normal          FHT:  136 / Moderate 6 - 25 bpm / accelerations presnet, no decelerations  [place]: none/yoandyg        DO Jessica 4/19/2022 6:34 PM

## 2022-04-19 NOTE — PROGRESS NOTES
A fetal ultrasound and NST were completed  See Ob procedures in Epic for an interpretation and recommendations  Do not hesitate to contact us in [facility] if you have questions  Andreia Ferreira MD, [address] [facility]  Maternal Fetal Medicine

## 2022-04-21 ENCOUNTER — ROUTINE PRENATAL (OUTPATIENT)
Dept: PERINATAL CARE | Facility: OTHER | Age: 27
End: 2022-04-21
Payer: COMMERCIAL

## 2022-04-21 VITALS
HEIGHT: 65 IN | HEART RATE: 90 BPM | SYSTOLIC BLOOD PRESSURE: 99 MMHG | BODY MASS INDEX: 34.69 KG/M2 | WEIGHT: 208.2 LBS | DIASTOLIC BLOOD PRESSURE: 70 MMHG

## 2022-04-21 DIAGNOSIS — Z3A.34 34 WEEKS GESTATION OF PREGNANCY: Primary | ICD-10-CM

## 2022-04-21 DIAGNOSIS — O99.213 MATERNAL OBESITY, ANTEPARTUM, THIRD TRIMESTER: ICD-10-CM

## 2022-04-21 DIAGNOSIS — O35.8XX0 ANOMALY OF HEART OF FETUS AFFECTING PREGNANCY, ANTEPARTUM, SINGLE OR UNSPECIFIED FETUS: ICD-10-CM

## 2022-04-21 DIAGNOSIS — O41.03X0 OLIGOHYDRAMNIOS ANTEPARTUM, THIRD TRIMESTER, NOT APPLICABLE OR UNSPECIFIED FETUS: ICD-10-CM

## 2022-04-21 PROCEDURE — 76815 OB US LIMITED FETUS(S): CPT | Performed by: OBSTETRICS & GYNECOLOGY

## 2022-04-21 PROCEDURE — 59025 FETAL NON-STRESS TEST: CPT | Performed by: OBSTETRICS & GYNECOLOGY

## 2022-04-21 NOTE — LETTER
April 21, 2022     Lulú Toure, [address] [facility]  [address]  [address] [facility]    Patient: Bambi Bartlett   YOB: 1995   Date of Visit: 4/21/2022       Dear Ms Jessenia Luis: Thank you for referring Fuad Baltazar to me for evaluation  Below are my notes for this consultation  If you have questions, please do not hesitate to call me  I look forward to following your patient along with you  Sincerely,        Hugo Hunt MD        CC: No Recipients  Hugo Hunt MD  4/21/2022  3:28 PM  Sign when Signing Visit  A fetal ultrasound and NST were completed  See Ob procedures in Epic for an interpretation and recommendations  Do not hesitate to contact us in [facility] if you have questions  Alex Bazzi MD, [address] [facility]  Maternal Fetal Medicine

## 2022-04-21 NOTE — PROGRESS NOTES
A fetal ultrasound and NST were completed  See Ob procedures in Epic for an interpretation and recommendations  Do not hesitate to contact us in [facility] if you have questions  Shakila Gutierrez MD, [address] [facility]  Maternal Fetal Medicine

## 2022-04-21 NOTE — TELEPHONE ENCOUNTER
Pediatric Referral from an OB Practice:    Is this the patients first baby?: YES    Is it important for the pediatrician to have evening/weekend hours?: YES    Is it important that the pediatrician speaks a language other than English?: NO    Do you prefer the idea of a small practice or a larger practice?  No preference    Pediatric practice chosen: [address] [place], [place] [address] [address]    Appointment with KASI Dean on May 6 at 3:34PM

## 2022-04-25 ENCOUNTER — TELEPHONE (OUTPATIENT)
Dept: OBGYN CLINIC | Facility: MEDICAL CENTER | Age: 27
End: 2022-04-25

## 2022-04-25 ENCOUNTER — ROUTINE PRENATAL (OUTPATIENT)
Dept: OBGYN CLINIC | Age: 27
End: 2022-04-25

## 2022-04-25 VITALS
DIASTOLIC BLOOD PRESSURE: 64 MMHG | SYSTOLIC BLOOD PRESSURE: 112 MMHG | WEIGHT: 208.8 LBS | HEIGHT: 65 IN | BODY MASS INDEX: 34.79 KG/M2

## 2022-04-25 DIAGNOSIS — Z3A.35 35 WEEKS GESTATION OF PREGNANCY: Primary | ICD-10-CM

## 2022-04-25 DIAGNOSIS — O35.8XX0 ANOMALY OF HEART OF FETUS AFFECTING PREGNANCY, ANTEPARTUM, SINGLE OR UNSPECIFIED FETUS: ICD-10-CM

## 2022-04-25 PROCEDURE — PNV: Performed by: STUDENT IN AN ORGANIZED HEALTH CARE EDUCATION/TRAINING PROGRAM

## 2022-04-25 NOTE — TELEPHONE ENCOUNTER
Lm for pt to contact office to discuss/clarify what pt needs completed   Pt stated that she was to have off four weeks prior to her due date, but she does not work in [place]          ----- Message from Jalen Morocho sent at 4/20/2022  5:21 PM EDT -----  Regarding: [employer]  Peyman Monreal 1995 [employer]

## 2022-04-25 NOTE — PROGRESS NOTES
Pt is here for routine ob visit   No concerns at this time  Unable to void   No LOF,VB,Contractions  +Fm   UTD flu/tdap  Declined Covid vaccines

## 2022-04-25 NOTE — PROGRESS NOTES
32 y o   at 35w3d, here for routine OB visit  Feeling well overall and without concerns  Good FM  Denies LOF, VB, contractions at this time  No questions/concerns  Has final visit with [facility] on Wednesday, for decision regarding delivery location  Problem   35 Weeks Gestation of Pregnancy    A positive  Antibody negative  Pap 2021   Normal  PN1 and 3T labs wnl  -s/p Tdap/flu vaccines  -Prepregnancy BMI 31 with goal weight gain 11-20#: TWG =19#    Likely delivery at [facility], pending follow up this week  -needs birth plan and delivery consent if delivering at [facility]         Problem List Items Addressed This Visit        Cardiovascular and Mediastinum    Fetal cardiac anomaly affecting pregnancy, antepartum       Other    35 weeks gestation of pregnancy - Primary     -precautions reviewed  -GBS next visit  -recommend d/c ASA at 36 weeks

## 2022-04-26 ENCOUNTER — ULTRASOUND (OUTPATIENT)
Dept: PERINATAL CARE | Facility: OTHER | Age: 27
End: 2022-04-26
Payer: COMMERCIAL

## 2022-04-26 VITALS
SYSTOLIC BLOOD PRESSURE: 100 MMHG | DIASTOLIC BLOOD PRESSURE: 53 MMHG | BODY MASS INDEX: 34.99 KG/M2 | HEIGHT: 65 IN | WEIGHT: 210 LBS | HEART RATE: 80 BPM

## 2022-04-26 DIAGNOSIS — O41.03X0 OLIGOHYDRAMNIOS ANTEPARTUM, THIRD TRIMESTER, NOT APPLICABLE OR UNSPECIFIED FETUS: Primary | ICD-10-CM

## 2022-04-26 DIAGNOSIS — Z3A.35 35 WEEKS GESTATION OF PREGNANCY: ICD-10-CM

## 2022-04-26 PROCEDURE — 76818 FETAL BIOPHYS PROFILE W/NST: CPT | Performed by: OBSTETRICS & GYNECOLOGY

## 2022-04-26 NOTE — PROGRESS NOTES
NST was done today  Pt  Reported active fetal movement at home between visit  Daily fetal kick count reviewed and emphasized  Patient verbalized understanding of all and was receptive      Dorinda Merida RN

## 2022-04-28 ENCOUNTER — TELEPHONE (OUTPATIENT)
Dept: OBGYN CLINIC | Facility: CLINIC | Age: 27
End: 2022-04-28

## 2022-04-28 ENCOUNTER — ROUTINE PRENATAL (OUTPATIENT)
Dept: PERINATAL CARE | Facility: OTHER | Age: 27
End: 2022-04-28
Payer: COMMERCIAL

## 2022-04-28 VITALS
BODY MASS INDEX: 35.06 KG/M2 | SYSTOLIC BLOOD PRESSURE: 99 MMHG | WEIGHT: 210.4 LBS | DIASTOLIC BLOOD PRESSURE: 61 MMHG | HEIGHT: 65 IN | HEART RATE: 89 BPM

## 2022-04-28 DIAGNOSIS — O41.03X0 OLIGOHYDRAMNIOS IN SINGLETON PREGNANCY IN THIRD TRIMESTER: ICD-10-CM

## 2022-04-28 DIAGNOSIS — O36.8390 FETAL ARRHYTHMIA AFFECTING PREGNANCY, ANTEPARTUM: Primary | ICD-10-CM

## 2022-04-28 DIAGNOSIS — Z3A.35 35 WEEKS GESTATION OF PREGNANCY: ICD-10-CM

## 2022-04-28 PROCEDURE — 59025 FETAL NON-STRESS TEST: CPT | Performed by: OBSTETRICS & GYNECOLOGY

## 2022-04-28 NOTE — PROGRESS NOTES
NST was done today  Pt  Reported active fetal movement at home between visit  Daily fetal kick count reviewed and emphasized  Patient verbalized understanding of all and was receptive      Madie Walker RN

## 2022-04-28 NOTE — TELEPHONE ENCOUNTER
Patient calling regarding her FMLA she has a different benefit from her employment that she can take she wants to take leave starting 05/02/22   Please call patient to discuss , paperwork is scanned

## 2022-04-30 NOTE — PATIENT INSTRUCTIONS
Thank you for choosing us for your  care today  If you have any questions about your ultrasound or care, please do not hesitate to contact us or your primary obstetrician  Some general instructions for your pregnancy are:     Protect against coronavirus: get vaccinated - pregnant women are increased risk of severe COVID  Notify your primary care doctor if you have any symptoms   Exercise: Aim for 22 minutes per day (150 minutes per week) of regular exercise  Walking is great!  Nutrition: aim for calcium-rich and iron-rich foods as well as healthy sources of protein   Learn about Preeclampsia: preeclampsia is a common, serious high blood pressure complication in pregnancy  A blood pressure of 894ZFAB (systolic or top number) or 79DZQC (diastolic or bottom number) is not normal and needs evaluation by your doctor  Aspirin is sometimes prescribed in early pregnancy to prevent preeclampsia in women with risk factors - ask your obstetrician if you should be on this medication   If you smoke, try to reduce how many cigarettes you smoke or try to quit completely  Do not vape   Other warning signs to watch out for in pregnancy or postpartum: chest pain, obstructed breathing or shortness of breath, seizures, thoughts of hurting yourself or your baby, bleeding, a painful or swollen leg, fever, or headache (see AWHONN POST-BIRTH Warning Signs campaign)  If these happen call 911  Itching is also not normal in pregnancy and if you experience this, especially over your hands and feet, potentially worse at night, notify your doctors

## 2022-05-03 ENCOUNTER — ULTRASOUND (OUTPATIENT)
Dept: PERINATAL CARE | Facility: OTHER | Age: 27
End: 2022-05-03
Payer: COMMERCIAL

## 2022-05-03 VITALS
WEIGHT: 209.6 LBS | HEART RATE: 80 BPM | HEIGHT: 65 IN | BODY MASS INDEX: 34.92 KG/M2 | DIASTOLIC BLOOD PRESSURE: 56 MMHG | SYSTOLIC BLOOD PRESSURE: 90 MMHG

## 2022-05-03 DIAGNOSIS — O32.9XX0 MALPRESENTATION BEFORE ONSET OF LABOR, SINGLE OR UNSPECIFIED FETUS: ICD-10-CM

## 2022-05-03 DIAGNOSIS — O35.8XX0 ANOMALY OF HEART OF FETUS AFFECTING PREGNANCY, ANTEPARTUM, SINGLE OR UNSPECIFIED FETUS: Primary | ICD-10-CM

## 2022-05-03 DIAGNOSIS — O41.03X0 OLIGOHYDRAMNIOS ANTEPARTUM, THIRD TRIMESTER, NOT APPLICABLE OR UNSPECIFIED FETUS: ICD-10-CM

## 2022-05-03 PROCEDURE — 76815 OB US LIMITED FETUS(S): CPT | Performed by: OBSTETRICS & GYNECOLOGY

## 2022-05-03 PROCEDURE — 59025 FETAL NON-STRESS TEST: CPT | Performed by: OBSTETRICS & GYNECOLOGY

## 2022-05-03 PROCEDURE — 99213 OFFICE O/P EST LOW 20 MIN: CPT | Performed by: OBSTETRICS & GYNECOLOGY

## 2022-05-03 NOTE — LETTER
May 3, 2022    Gera De Luna, [address] [facility]    Patient: Anat Price   YOB: 1995   Date of Visit: 5/3/2022   Gestational age 37w2d   [place] of this communication: Routine though please note BREECH at 36+4 (does not want ECV)   Who saw her last from your group: Checo Burger   Who is seeing her next from your group: Madison Bloch       Dear PRoviders,    This patient was seen recently in our  office  The content of my evaluation today is in the ultrasound report under "OB Procedures" tab  Please don't hesitate to contact our office with any concerns or questions       Sincerely,    Antonio Venegas MD  Attending Physician, [place]

## 2022-05-03 NOTE — PROGRESS NOTES
NST was done today  Pt  Reported active fetal movement at home between visit  Daily fetal kick count reviewed and emphasized  Patient verbalized understanding of all and was receptive      Nikole Rosenbaum RN

## 2022-05-03 NOTE — PROGRESS NOTES
[address]: Ms Jung Diaz was seen today for NST (found under the pregnancy episode) which I reviewed the RN assessment and agree, and DORY (see ultrasound report under OB procedures tab)  Discussed the finding of persistent malpresentation today; she reports that if spontaneous version does not occur then she will plan  delivery (she declines ECV)  The time spent on this established patient on the encounter date included 5 minutes previsit service time reviewing records and precharting, 10 minutes face-to-face service time counseling regarding results and coordinating care, and  5 minutes charting, totalling 20 minutes  Please don't hesitate to contact our office with any concerns or questions    Evangelista Jones MD

## 2022-05-05 ENCOUNTER — TELEPHONE (OUTPATIENT)
Dept: PERINATAL CARE | Facility: CLINIC | Age: 27
End: 2022-05-05

## 2022-05-05 NOTE — TELEPHONE ENCOUNTER
Left patient a message that her 5/6/22 [facility] appointment had to be rescheduled  The new time, date and location were provided - 5/6/22  10:15  MYNOR  The patient has been instructed to please call us back at [phone] with any questions or concerns

## 2022-05-06 ENCOUNTER — ROUTINE PRENATAL (OUTPATIENT)
Dept: PERINATAL CARE | Facility: OTHER | Age: 27
End: 2022-05-06
Payer: COMMERCIAL

## 2022-05-06 VITALS
HEIGHT: 65 IN | HEART RATE: 76 BPM | DIASTOLIC BLOOD PRESSURE: 61 MMHG | BODY MASS INDEX: 34.92 KG/M2 | WEIGHT: 209.6 LBS | SYSTOLIC BLOOD PRESSURE: 101 MMHG

## 2022-05-06 DIAGNOSIS — O41.03X0 OLIGOHYDRAMNIOS ANTEPARTUM, THIRD TRIMESTER, NOT APPLICABLE OR UNSPECIFIED FETUS: Primary | ICD-10-CM

## 2022-05-06 DIAGNOSIS — Z3A.37 37 WEEKS GESTATION OF PREGNANCY: ICD-10-CM

## 2022-05-06 PROCEDURE — 59025 FETAL NON-STRESS TEST: CPT | Performed by: OBSTETRICS & GYNECOLOGY

## 2022-05-06 NOTE — PATIENT INSTRUCTIONS
Nonstress Test for Pregnancy   WHAT YOU NEED TO KNOW:   What do I need to know about a nonstress test?  A nonstress test measures your baby's heart rate and movements  Nonstress means that no stress will be placed on your baby during the test   How do I prepare for a nonstress test?  Your healthcare provider will talk to you about how to prepare for this test  He or she may tell you to eat and drink plenty of fluids before your test  If you smoke, you may be asked not to smoke within 2 hours before the test  He or she will also tell you which medicines to take or not take on the day of your test   What will happen during a nonstress test?  You may be asked to lie down or recline back for the test on a bed  One or 2 belts with sensors will be placed around your abdomen  Your baby's heart rate will be recorded with a machine  If your baby does not move, your baby may be asleep  Your healthcare provider may make a noise near your abdomen to try to wake your baby  The test usually takes about 20 minutes, but can take longer if your baby needs to be awakened  What do I need to know about the test results? Your baby will be expected to move at least 2 times for a certain amount of time  Your baby's heart rate will be expected to go up by a certain number of beats per minute during movement  If your baby does not move as expected, the test may need to be repeated or you may need other tests  CARE AGREEMENT:   You have the right to help plan your care  Learn about your health condition and how it may be treated  Discuss treatment options with your healthcare providers to decide what care you want to receive  You always have the right to refuse treatment  The above information is an  only  It is not intended as medical advice for individual conditions or treatments  Talk to your doctor, nurse or pharmacist before following any medical regimen to see if it is safe and effective for you    © Copyright [employer] 2022 Information is for [employer] use only and may not be sold, redistributed or otherwise used for commercial purposes  All illustrations and images included in CareNotes® are the copyrighted property of [employer]  or [address] [place] [address] Елена López in Pregnancy   AMBULATORY CARE:   Kick counts  measure how much your baby is moving in your womb  A kick from your baby can be felt as a twist, turn, swish, roll, or jab  It is common to feel your baby kicking at 26 to 28 weeks of pregnancy  You may feel your baby kick as early as 20 weeks of pregnancy  You may want to start counting at 28 weeks  Contact your doctor immediately if:   · You feel a change in the number of kicks or movements of your baby  · You feel fewer than 10 kicks within 2 hours  · You have questions or concerns about your baby's movements  Why measure kick counts:  Your baby's movement may provide information about your baby's health  He or she may move less, or not at all, if there are problems  Your baby may move less if he or she is not getting enough oxygen or nutrition from the placenta  Do not smoke while you are pregnant  Smoking decreases the amount of oxygen that gets to your baby  Talk to your healthcare provider if you need help to quit smoking  Tell your healthcare provider as soon as you feel a change in your baby's movements  When to measure kick counts:   · Measure kick counts at the same time every day  · Measure kick counts when your baby is awake and most active  Your baby may be most active in the evening  How to measure kick counts:  Check that your baby is awake before you measure kick counts  You can wake up your baby by lightly pushing on your belly, walking, or drinking something cold  Your healthcare provider may tell you different ways to measure kick counts  You may be told to do the following:  · Use a chart or clock to keep track of the time you start and finish counting       · Sit in a chair or lie on your left side  · Place your hands on the largest part of your belly  · Count until you reach 10 kicks  Write down how much time it takes to count 10 kicks  · It may take 30 minutes to 2 hours to count 10 kicks  It should not take more than 2 hours to count 10 kicks  Follow up with your doctor as directed:  Write down your questions so you remember to ask them during your visits  © Copyright [employer] 2022 Information is for End User's use only and may not be sold, redistributed or otherwise used for commercial purposes  All illustrations and images included in CareNotes® are the copyrighted property of A D A M , Inc  or [facility]   The above information is an  only  It is not intended as medical advice for individual conditions or treatments  Talk to your doctor, nurse or pharmacist before following any medical regimen to see if it is safe and effective for you

## 2022-05-10 ENCOUNTER — ULTRASOUND (OUTPATIENT)
Dept: PERINATAL CARE | Facility: OTHER | Age: 27
End: 2022-05-10
Payer: COMMERCIAL

## 2022-05-10 VITALS
DIASTOLIC BLOOD PRESSURE: 67 MMHG | WEIGHT: 209 LBS | BODY MASS INDEX: 34.82 KG/M2 | HEIGHT: 65 IN | SYSTOLIC BLOOD PRESSURE: 99 MMHG | HEART RATE: 82 BPM

## 2022-05-10 DIAGNOSIS — Z3A.37 37 WEEKS GESTATION OF PREGNANCY: Primary | ICD-10-CM

## 2022-05-10 DIAGNOSIS — O36.8390 FETAL ARRHYTHMIA AFFECTING PREGNANCY, ANTEPARTUM: ICD-10-CM

## 2022-05-10 DIAGNOSIS — O41.03X0 OLIGOHYDRAMNIOS ANTEPARTUM, THIRD TRIMESTER, NOT APPLICABLE OR UNSPECIFIED FETUS: ICD-10-CM

## 2022-05-10 PROCEDURE — 59025 FETAL NON-STRESS TEST: CPT | Performed by: OBSTETRICS & GYNECOLOGY

## 2022-05-10 PROCEDURE — 76815 OB US LIMITED FETUS(S): CPT | Performed by: OBSTETRICS & GYNECOLOGY

## 2022-05-10 NOTE — LETTER
May 10, 2022     Franca Diego MD  [facility] [address]  [address]    Patient: Dk Braun   YOB: 1995   Date of Visit: 5/10/2022       Dear Dr Kulwant Vargas: Thank you for referring Genie Johnston to me for evaluation  Below are my notes for this consultation  If you have questions, please do not hesitate to call me  I look forward to following your patient along with you  Sincerely,        Velma Granados RN        CC: No Recipients  Iris oGre MD  5/10/2022  2:44 PM  Sign when Signing Visit  Please refer to the [facility] ultrasound report in Ob Procedures for additional information regarding today's visit

## 2022-05-10 NOTE — PROGRESS NOTES
Please refer to the [facility] ultrasound report in Ob Procedures for additional information regarding today's visit

## 2022-05-10 NOTE — PROGRESS NOTES
Repeat Non-Stress Testing:    Pt verbalizes +FM  Pt denies ALL:               Leaking of fluid   Contractions   Vaginal bleeding   Decreased fetal movement    Patient has no questions or concerns  Dr Kristyn Devlin viewed NST strip prior to completion of visit

## 2022-05-10 NOTE — PATIENT INSTRUCTIONS
Nonstress Test for Pregnancy   WHAT YOU NEED TO KNOW:   What do I need to know about a nonstress test?  A nonstress test measures your baby's heart rate and movements  Nonstress means that no stress will be placed on your baby during the test   How do I prepare for a nonstress test?  Your healthcare provider will talk to you about how to prepare for this test  He or she may tell you to eat and drink plenty of fluids before your test  If you smoke, you may be asked not to smoke within 2 hours before the test  He or she will also tell you which medicines to take or not take on the day of your test   What will happen during a nonstress test?  You may be asked to lie down or recline back for the test on a bed  One or 2 belts with sensors will be placed around your abdomen  Your baby's heart rate will be recorded with a machine  If your baby does not move, your baby may be asleep  Your healthcare provider may make a noise near your abdomen to try to wake your baby  The test usually takes about 20 minutes, but can take longer if your baby needs to be awakened  What do I need to know about the test results? Your baby will be expected to move at least 2 times for a certain amount of time  Your baby's heart rate will be expected to go up by a certain number of beats per minute during movement  If your baby does not move as expected, the test may need to be repeated or you may need other tests  CARE AGREEMENT:   You have the right to help plan your care  Learn about your health condition and how it may be treated  Discuss treatment options with your healthcare providers to decide what care you want to receive  You always have the right to refuse treatment  The above information is an  only  It is not intended as medical advice for individual conditions or treatments  Talk to your doctor, nurse or pharmacist before following any medical regimen to see if it is safe and effective for you    © Copyright [employer] 2022 Information is for [employer] use only and may not be sold, redistributed or otherwise used for commercial purposes  All illustrations and images included in CareNotes® are the copyrighted property of [employer]  or [address] [place] [address] Елена López in Pregnancy   AMBULATORY CARE:   Kick counts  measure how much your baby is moving in your womb  A kick from your baby can be felt as a twist, turn, swish, roll, or jab  It is common to feel your baby kicking at 26 to 28 weeks of pregnancy  You may feel your baby kick as early as 20 weeks of pregnancy  You may want to start counting at 28 weeks  Contact your doctor immediately if:   · You feel a change in the number of kicks or movements of your baby  · You feel fewer than 10 kicks within 2 hours  · You have questions or concerns about your baby's movements  Why measure kick counts:  Your baby's movement may provide information about your baby's health  He or she may move less, or not at all, if there are problems  Your baby may move less if he or she is not getting enough oxygen or nutrition from the placenta  Do not smoke while you are pregnant  Smoking decreases the amount of oxygen that gets to your baby  Talk to your healthcare provider if you need help to quit smoking  Tell your healthcare provider as soon as you feel a change in your baby's movements  When to measure kick counts:   · Measure kick counts at the same time every day  · Measure kick counts when your baby is awake and most active  Your baby may be most active in the evening  How to measure kick counts:  Check that your baby is awake before you measure kick counts  You can wake up your baby by lightly pushing on your belly, walking, or drinking something cold  Your healthcare provider may tell you different ways to measure kick counts  You may be told to do the following:  · Use a chart or clock to keep track of the time you start and finish counting       · Sit in a chair or lie on your left side  · Place your hands on the largest part of your belly  · Count until you reach 10 kicks  Write down how much time it takes to count 10 kicks  · It may take 30 minutes to 2 hours to count 10 kicks  It should not take more than 2 hours to count 10 kicks  Follow up with your doctor as directed:  Write down your questions so you remember to ask them during your visits  © Copyright [employer] 2022 Information is for End User's use only and may not be sold, redistributed or otherwise used for commercial purposes  All illustrations and images included in CareNotes® are the copyrighted property of A D A M [employer] Inc  or [facility]   The above information is an  only  It is not intended as medical advice for individual conditions or treatments  Talk to your doctor, nurse or pharmacist before following any medical regimen to see if it is safe and effective for you

## 2022-05-11 PROBLEM — O41.00X0 OLIGOHYDRAMNIOS ANTEPARTUM: Status: RESOLVED | Noted: 2022-04-19 | Resolved: 2022-05-11

## 2022-05-12 ENCOUNTER — ROUTINE PRENATAL (OUTPATIENT)
Dept: PERINATAL CARE | Facility: OTHER | Age: 27
End: 2022-05-12
Payer: COMMERCIAL

## 2022-05-12 VITALS
DIASTOLIC BLOOD PRESSURE: 64 MMHG | HEIGHT: 65 IN | BODY MASS INDEX: 35.25 KG/M2 | WEIGHT: 211.6 LBS | HEART RATE: 88 BPM | SYSTOLIC BLOOD PRESSURE: 93 MMHG

## 2022-05-12 DIAGNOSIS — O41.03X0 OLIGOHYDRAMNIOS ANTEPARTUM, THIRD TRIMESTER, NOT APPLICABLE OR UNSPECIFIED FETUS: ICD-10-CM

## 2022-05-12 DIAGNOSIS — Z3A.37 37 WEEKS GESTATION OF PREGNANCY: ICD-10-CM

## 2022-05-12 PROCEDURE — 59025 FETAL NON-STRESS TEST: CPT | Performed by: OBSTETRICS & GYNECOLOGY

## 2022-05-17 ENCOUNTER — ANESTHESIA EVENT (INPATIENT)
Dept: LABOR AND DELIVERY | Facility: HOSPITAL | Age: 27
End: 2022-05-17
Payer: COMMERCIAL

## 2022-05-17 ENCOUNTER — HOSPITAL ENCOUNTER (INPATIENT)
Facility: HOSPITAL | Age: 27
LOS: 2 days | Discharge: HOME | End: 2022-05-19
Attending: OBSTETRICS & GYNECOLOGY | Admitting: OBSTETRICS & GYNECOLOGY
Payer: COMMERCIAL

## 2022-05-17 ENCOUNTER — ROUTINE PRENATAL (OUTPATIENT)
Dept: OBGYN CLINIC | Facility: CLINIC | Age: 27
End: 2022-05-17

## 2022-05-17 ENCOUNTER — ANESTHESIA (INPATIENT)
Dept: LABOR AND DELIVERY | Facility: HOSPITAL | Age: 27
End: 2022-05-17
Payer: COMMERCIAL

## 2022-05-17 VITALS
HEIGHT: 65 IN | WEIGHT: 211.6 LBS | BODY MASS INDEX: 35.25 KG/M2 | DIASTOLIC BLOOD PRESSURE: 80 MMHG | SYSTOLIC BLOOD PRESSURE: 110 MMHG

## 2022-05-17 DIAGNOSIS — Z3A.37 37 WEEKS GESTATION OF PREGNANCY: ICD-10-CM

## 2022-05-17 DIAGNOSIS — O41.00X0 OLIGOHYDRAMNIOS: ICD-10-CM

## 2022-05-17 DIAGNOSIS — O41.03X0 OLIGOHYDRAMNIOS ANTEPARTUM, THIRD TRIMESTER, NOT APPLICABLE OR UNSPECIFIED FETUS: ICD-10-CM

## 2022-05-17 DIAGNOSIS — Z98.891 STATUS POST PRIMARY LOW TRANSVERSE CESAREAN SECTION: ICD-10-CM

## 2022-05-17 DIAGNOSIS — O32.9XX0 MALPRESENTATION BEFORE ONSET OF LABOR, SINGLE OR UNSPECIFIED FETUS: Primary | ICD-10-CM

## 2022-05-17 DIAGNOSIS — O32.9XX0 MALPRESENTATION BEFORE ONSET OF LABOR, SINGLE OR UNSPECIFIED FETUS: ICD-10-CM

## 2022-05-17 DIAGNOSIS — Z34.93 PRENATAL CARE IN THIRD TRIMESTER: Primary | ICD-10-CM

## 2022-05-17 DIAGNOSIS — O35.8XX0 ANOMALY OF HEART OF FETUS AFFECTING PREGNANCY, ANTEPARTUM, SINGLE OR UNSPECIFIED FETUS: ICD-10-CM

## 2022-05-17 PROBLEM — Z3A.38 38 WEEKS GESTATION OF PREGNANCY: Status: ACTIVE | Noted: 2022-02-14

## 2022-05-17 LAB
ABO GROUP BLD: NORMAL
BASE EXCESS BLDCOA CALC-SCNC: -3 MMOL/L (ref 3–11)
BASE EXCESS BLDCOV CALC-SCNC: -3.4 MMOL/L (ref 1–9)
BLD GP AB SCN SERPL QL: NEGATIVE
ERYTHROCYTE [DISTWIDTH] IN BLOOD BY AUTOMATED COUNT: 13.6 % (ref 11.6–15.1)
HCO3 BLDCOA-SCNC: 24.9 MMOL/L (ref 17.3–27.3)
HCO3 BLDCOV-SCNC: 22.1 MMOL/L (ref 12.2–28.6)
HCT VFR BLD AUTO: 39.8 % (ref 34.8–46.1)
HGB BLD-MCNC: 13.6 G/DL (ref 11.5–15.4)
MCH RBC QN AUTO: 33.1 PG (ref 26.8–34.3)
MCHC RBC AUTO-ENTMCNC: 34.2 G/DL (ref 31.4–37.4)
MCV RBC AUTO: 97 FL (ref 82–98)
O2 CT VFR BLDCOA CALC: 2.9 ML/DL
OXYHGB MFR BLDCOA: 14 %
OXYHGB MFR BLDCOV: 40.9 %
PCO2 BLDCOA: 55.8 MM[HG] (ref 30–60)
PCO2 BLDCOV: 41.3 MM HG (ref 27–43)
PH BLDCOA: 7.27 [PH] (ref 7.23–7.43)
PH BLDCOV: 7.35 [PH] (ref 7.19–7.49)
PLATELET # BLD AUTO: 232 THOUSANDS/UL (ref 149–390)
PMV BLD AUTO: 11.8 FL (ref 8.9–12.7)
PO2 BLDCOA: 10.9 MM HG (ref 5–25)
PO2 BLDCOV: 19.1 MM HG (ref 15–45)
RBC # BLD AUTO: 4.11 MILLION/UL (ref 3.81–5.12)
RH BLD: POSITIVE
SAO2 % BLDCOV: 8.8 ML/DL
SL AMB  POCT GLUCOSE, UA: NEGATIVE
SL AMB POCT URINE PROTEIN: NEGATIVE
SPECIMEN EXPIRATION DATE: NORMAL
WBC # BLD AUTO: 8.23 THOUSAND/UL (ref 4.31–10.16)

## 2022-05-17 PROCEDURE — 86592 SYPHILIS TEST NON-TREP QUAL: CPT | Performed by: OBSTETRICS & GYNECOLOGY

## 2022-05-17 PROCEDURE — 86850 RBC ANTIBODY SCREEN: CPT | Performed by: OBSTETRICS & GYNECOLOGY

## 2022-05-17 PROCEDURE — 87150 DNA/RNA AMPLIFIED PROBE: CPT | Performed by: STUDENT IN AN ORGANIZED HEALTH CARE EDUCATION/TRAINING PROGRAM

## 2022-05-17 PROCEDURE — 85027 COMPLETE CBC AUTOMATED: CPT | Performed by: OBSTETRICS & GYNECOLOGY

## 2022-05-17 PROCEDURE — 4A1HXCZ MONITORING OF PRODUCTS OF CONCEPTION, CARDIAC RATE, EXTERNAL APPROACH: ICD-10-PCS | Performed by: OBSTETRICS & GYNECOLOGY

## 2022-05-17 PROCEDURE — 86901 BLOOD TYPING SEROLOGIC RH(D): CPT | Performed by: OBSTETRICS & GYNECOLOGY

## 2022-05-17 PROCEDURE — 82805 BLOOD GASES W/O2 SATURATION: CPT | Performed by: OBSTETRICS & GYNECOLOGY

## 2022-05-17 PROCEDURE — 88307 TISSUE EXAM BY PATHOLOGIST: CPT | Performed by: PATHOLOGY

## 2022-05-17 PROCEDURE — NC001 PR NO CHARGE: Performed by: OBSTETRICS & GYNECOLOGY

## 2022-05-17 PROCEDURE — 59510 CESAREAN DELIVERY: CPT | Performed by: OBSTETRICS & GYNECOLOGY

## 2022-05-17 PROCEDURE — 99213 OFFICE O/P EST LOW 20 MIN: CPT

## 2022-05-17 PROCEDURE — 86900 BLOOD TYPING SEROLOGIC ABO: CPT | Performed by: OBSTETRICS & GYNECOLOGY

## 2022-05-17 PROCEDURE — PNV: Performed by: STUDENT IN AN ORGANIZED HEALTH CARE EDUCATION/TRAINING PROGRAM

## 2022-05-17 RX ORDER — OXYTOCIN/RINGER'S LACTATE 30/500 ML
PLASTIC BAG, INJECTION (ML) INTRAVENOUS CONTINUOUS PRN
Status: DISCONTINUED | OUTPATIENT
Start: 2022-05-17 | End: 2022-05-17

## 2022-05-17 RX ORDER — SODIUM CHLORIDE, SODIUM LACTATE, POTASSIUM CHLORIDE, CALCIUM CHLORIDE 600; 310; 30; 20 MG/100ML; MG/100ML; MG/100ML; MG/100ML
125 INJECTION, SOLUTION INTRAVENOUS CONTINUOUS
Status: DISCONTINUED | OUTPATIENT
Start: 2022-05-17 | End: 2022-05-18

## 2022-05-17 RX ORDER — MEPERIDINE HYDROCHLORIDE 25 MG/ML
12.5 INJECTION INTRAMUSCULAR; INTRAVENOUS; SUBCUTANEOUS
Status: DISCONTINUED | OUTPATIENT
Start: 2022-05-17 | End: 2022-05-19 | Stop reason: HOSPADM

## 2022-05-17 RX ORDER — HYDROMORPHONE HCL/PF 1 MG/ML
0.5 SYRINGE (ML) INJECTION
Status: DISCONTINUED | OUTPATIENT
Start: 2022-05-17 | End: 2022-05-19 | Stop reason: HOSPADM

## 2022-05-17 RX ORDER — NALOXONE HYDROCHLORIDE 0.4 MG/ML
0.1 INJECTION, SOLUTION INTRAMUSCULAR; INTRAVENOUS; SUBCUTANEOUS
Status: ACTIVE | OUTPATIENT
Start: 2022-05-17 | End: 2022-05-18

## 2022-05-17 RX ORDER — ONDANSETRON 2 MG/ML
4 INJECTION INTRAMUSCULAR; INTRAVENOUS ONCE AS NEEDED
Status: DISCONTINUED | OUTPATIENT
Start: 2022-05-17 | End: 2022-05-19 | Stop reason: HOSPADM

## 2022-05-17 RX ORDER — FENTANYL CITRATE/PF 50 MCG/ML
25 SYRINGE (ML) INJECTION
Status: DISCONTINUED | OUTPATIENT
Start: 2022-05-17 | End: 2022-05-19 | Stop reason: HOSPADM

## 2022-05-17 RX ORDER — ONDANSETRON 2 MG/ML
4 INJECTION INTRAMUSCULAR; INTRAVENOUS EVERY 8 HOURS PRN
Status: DISCONTINUED | OUTPATIENT
Start: 2022-05-17 | End: 2022-05-18

## 2022-05-17 RX ORDER — ONDANSETRON 2 MG/ML
4 INJECTION INTRAMUSCULAR; INTRAVENOUS EVERY 4 HOURS PRN
Status: ACTIVE | OUTPATIENT
Start: 2022-05-17 | End: 2022-05-18

## 2022-05-17 RX ORDER — CEFAZOLIN SODIUM 2 G/50ML
2000 SOLUTION INTRAVENOUS ONCE
Status: COMPLETED | OUTPATIENT
Start: 2022-05-17 | End: 2022-05-17

## 2022-05-17 RX ORDER — DEXAMETHASONE SODIUM PHOSPHATE 10 MG/ML
INJECTION, SOLUTION INTRAMUSCULAR; INTRAVENOUS AS NEEDED
Status: DISCONTINUED | OUTPATIENT
Start: 2022-05-17 | End: 2022-05-17

## 2022-05-17 RX ORDER — DEXAMETHASONE SODIUM PHOSPHATE 10 MG/ML
8 INJECTION, SOLUTION INTRAMUSCULAR; INTRAVENOUS ONCE AS NEEDED
Status: ACTIVE | OUTPATIENT
Start: 2022-05-17 | End: 2022-05-18

## 2022-05-17 RX ORDER — METOCLOPRAMIDE HYDROCHLORIDE 5 MG/ML
5 INJECTION INTRAMUSCULAR; INTRAVENOUS EVERY 6 HOURS PRN
Status: ACTIVE | OUTPATIENT
Start: 2022-05-17 | End: 2022-05-18

## 2022-05-17 RX ORDER — MORPHINE SULFATE 0.5 MG/ML
INJECTION, SOLUTION EPIDURAL; INTRATHECAL; INTRAVENOUS AS NEEDED
Status: DISCONTINUED | OUTPATIENT
Start: 2022-05-17 | End: 2022-05-17

## 2022-05-17 RX ORDER — ALBUTEROL SULFATE 2.5 MG/3ML
2.5 SOLUTION RESPIRATORY (INHALATION) ONCE AS NEEDED
Status: DISCONTINUED | OUTPATIENT
Start: 2022-05-17 | End: 2022-05-19 | Stop reason: HOSPADM

## 2022-05-17 RX ORDER — OXYTOCIN/RINGER'S LACTATE 30/500 ML
62.5 PLASTIC BAG, INJECTION (ML) INTRAVENOUS ONCE
Status: COMPLETED | OUTPATIENT
Start: 2022-05-17 | End: 2022-05-17

## 2022-05-17 RX ORDER — TRISODIUM CITRATE DIHYDRATE AND CITRIC ACID MONOHYDRATE 500; 334 MG/5ML; MG/5ML
30 SOLUTION ORAL ONCE
Status: DISCONTINUED | OUTPATIENT
Start: 2022-05-17 | End: 2022-05-18

## 2022-05-17 RX ORDER — KETOROLAC TROMETHAMINE 30 MG/ML
INJECTION, SOLUTION INTRAMUSCULAR; INTRAVENOUS AS NEEDED
Status: DISCONTINUED | OUTPATIENT
Start: 2022-05-17 | End: 2022-05-17

## 2022-05-17 RX ORDER — FENTANYL CITRATE 50 UG/ML
INJECTION, SOLUTION INTRAMUSCULAR; INTRAVENOUS AS NEEDED
Status: DISCONTINUED | OUTPATIENT
Start: 2022-05-17 | End: 2022-05-17

## 2022-05-17 RX ORDER — PROMETHAZINE HYDROCHLORIDE 25 MG/ML
12.5 INJECTION, SOLUTION INTRAMUSCULAR; INTRAVENOUS ONCE AS NEEDED
Status: DISCONTINUED | OUTPATIENT
Start: 2022-05-17 | End: 2022-05-19 | Stop reason: HOSPADM

## 2022-05-17 RX ORDER — ONDANSETRON 2 MG/ML
INJECTION INTRAMUSCULAR; INTRAVENOUS AS NEEDED
Status: DISCONTINUED | OUTPATIENT
Start: 2022-05-17 | End: 2022-05-17

## 2022-05-17 RX ADMIN — DEXAMETHASONE SODIUM PHOSPHATE 10 MG: 10 INJECTION, SOLUTION INTRAMUSCULAR; INTRAVENOUS at 19:47

## 2022-05-17 RX ADMIN — SODIUM CHLORIDE, SODIUM LACTATE, POTASSIUM CHLORIDE, AND CALCIUM CHLORIDE 125 ML/HR: .6; .31; .03; .02 INJECTION, SOLUTION INTRAVENOUS at 22:34

## 2022-05-17 RX ADMIN — MORPHINE SULFATE 0.1 MG: 0.5 INJECTION EPIDURAL; INTRATHECAL; INTRAVENOUS at 19:45

## 2022-05-17 RX ADMIN — PHENYLEPHRINE HYDROCHLORIDE 40 MCG/MIN: 10 INJECTION INTRAVENOUS at 19:47

## 2022-05-17 RX ADMIN — SODIUM CHLORIDE, SODIUM LACTATE, POTASSIUM CHLORIDE, AND CALCIUM CHLORIDE 125 ML/HR: .6; .31; .03; .02 INJECTION, SOLUTION INTRAVENOUS at 15:23

## 2022-05-17 RX ADMIN — CEFAZOLIN SODIUM 2000 MG: 2 SOLUTION INTRAVENOUS at 19:41

## 2022-05-17 RX ADMIN — FENTANYL CITRATE 10 MCG: 50 INJECTION INTRAMUSCULAR; INTRAVENOUS at 19:45

## 2022-05-17 RX ADMIN — Medication 62.5 MILLI-UNITS/MIN: at 22:34

## 2022-05-17 RX ADMIN — Medication 500 MILLI-UNITS/MIN: at 20:08

## 2022-05-17 RX ADMIN — KETOROLAC TROMETHAMINE 30 MG: 30 INJECTION, SOLUTION INTRAMUSCULAR at 20:26

## 2022-05-17 RX ADMIN — SODIUM CHLORIDE, SODIUM LACTATE, POTASSIUM CHLORIDE, AND CALCIUM CHLORIDE 1000 ML: .6; .31; .03; .02 INJECTION, SOLUTION INTRAVENOUS at 14:00

## 2022-05-17 RX ADMIN — ONDANSETRON 4 MG: 2 INJECTION INTRAMUSCULAR; INTRAVENOUS at 19:47

## 2022-05-17 NOTE — PROGRESS NOTES
Fetal cardiac anomaly affecting pregnancy, antepartum  Per last [facility] note  okay for delivery at [address]  37 weeks gestation of pregnancy  33 yo  at 38+4 here for routine ob visit  Feeling well  Nervous for delivery  No contractions, leaking or bleeding  Good fetal movement  GBS collected  Oligohydramnios antepartum, third trimester, not applicable or unspecified fetus  Bedside scan with single 1 4 cm pocket  Sent to L&D for likely delivery with oligo at term    Malpresentation before onset of labor  Breech  Patient preference is for primary CS  Declines ECV

## 2022-05-17 NOTE — ASSESSMENT & PLAN NOTE
31 yo  at 38+4 here for routine ob visit  Feeling well  Nervous for delivery  No contractions, leaking or bleeding  Good fetal movement  GBS collected

## 2022-05-17 NOTE — ASSESSMENT & PLAN NOTE
Single pocket of 1 4cm in the office prior to presentation  Repeat scan with single pocket of 1 9cm in right upper quadrant  Indication for delivery

## 2022-05-17 NOTE — H&P
H&P Exam - Obstetrics   Jacob Service 32 y o  female MRN: [id number]  Unit/Bed#: LD TRIAGE  Encounter: [id number]    Assessment & Plan:  27yo  at 38w4d weeks gestation who is being admitted for primary  section  By issue: Malpresentation before onset of labor  Assessment & Plan  Confirmed breech on admission with fetal head on the maternal left  For delivery by primary  section  Anesthesia consult for spinal  Ancef 2g IV for surgical prophylaxis    Fetal cardiac anomaly affecting pregnancy, antepartum  Assessment & Plan  Cleared for delivery at [address] [place] [address] aware that patient is being delivered today    38 weeks gestation of pregnancy  Assessment & Plan  CBC wnl  FU RPR, Blood Type & Screen      * Oligohydramnios antepartum, third trimester, not applicable or unspecified fetus  Assessment & Plan  Single pocket of 1 4cm in the office prior to presentation  Repeat scan with single pocket of 1 9cm in right upper quadrant  Indication for delivery      Dr Alexandria Snow aware  This patient will be an INPATIENT and I certify the anticipated length of stay is >2 Midnights  Chief Complaint: "my fluid was low in the office"    History of Present Illness:  Jacob Service is a 32 y o   with an KITTY of 2022, by Last Menstrual Period , now at 38w4d weeks gestation who presents from her obstetrician's office for further evalaution  Was at a prenatal visit and found to have oligohydramnios with fetus breech  No contractions, loss of fluid or vaginal bleeding  Repeat DORY consistent with oligohydramnios  Previous ultrasounds throughout the pregnancy have shown oligohydramnios with subsequent resolution  Given current gestational age there is an indication for delivery  Obstetrician: Ting Valdes    Pregnancy complications:   1  Obesity, BMI 35kg/m2 on admission  2   Fetal cardiac anomaly  OB History    Para Term  AB Living   1 0 0 0 0 0   SAB IAB Ectopic Multiple Live Births   0 0 0 0 0      # Outcome Date GA Lbr Stephen/2nd Weight Sex Delivery Anes PTL Lv   1 Current                Baby complications/comments:   Breech  EFW 3/31/22 1842 grams - 4 lbs 1 oz (42%)    [facility] fetal echo 3/16/22:  1  There is left ventricle/right ventricle disproportion  2  LV mild hypoplasia  3  The left ventricle is slender, but nearly apex forming  4  Normal left ventricular systolic function  5  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  6  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  7  Mild transverse arch hypoplasia is seen  8  Normal flow in the aorta  Review of Systems   12-point ROS negative unless stated in HPI  Historical Information   Past Medical History:   Diagnosis Date    Mononucleosis     Pleurisy     Varicella     disease     Past Surgical History:   Procedure Laterality Date    WISDOM TOOTH EXTRACTION       Social History   Social History     Substance and Sexual Activity   Alcohol Use Not Currently     Social History     Substance and Sexual Activity   Drug Use No     Social History     Tobacco Use   Smoking Status Former Smoker    Packs/day: 0 25    Types: Cigarettes    Quit date: 9/15/2021    Years since quittin 6   Smokeless Tobacco Never Used   Tobacco Comment    quit mid sept     Family History: non-contributory    Meds/Allergies    {  Medications Prior to Admission   Medication    aspirin 81 mg chewable tablet    Prenatal Vit-Fe Fumarate-FA (PRENATAL VITAMINS PO)      No Known Allergies    OBJECTIVE:  Vitals:   BP 93/58 (BP Location: Left arm)   Pulse 80   Temp 98 °F (36 7 °C) (Oral)   Resp 18   LMP 2021 (LMP Unknown)    BMI 35kg/m2    Physical Exam  Physical Exam:   GEN: alert and oriented x 3, no apparent distress, appears well  CARDIAC: regular rate and rhythm  PULMONARY: no respiratory distress, clear to ausculation bilaterally  ABDOMEN: gravid, soft, no tenderness  EXTREMITIES: nontender, trace edema  FETAL ASSESSMENT:  Fetal heart rate: 130/moderate variability/15x15 accelerations/no decelerations, reactive  [place]:  no contractions    Prenatal Labs: I have personally reviewed pertinent reports  Blood type: A+  Antibody screen: negative  Group B strep: pending  HIV: negative  Hepatitis B surface antigen: nonreactive  Hepatitis C antibody: nonreactive  RPR: non-reactive  Rubella: Immune  Varicella: unknown  1 hour Glucose: 78  Gonorrhea/Chlamydia: negative    Invasive Devices  Timeline    None                 Thais Jimenez MD  PGY-II, OBGYN  5/17/2022, 2:34 PM

## 2022-05-17 NOTE — ASSESSMENT & PLAN NOTE
Confirmed breech on admission with fetal head on the maternal left  For delivery by primary  section  Anesthesia consult for spinal  Ancef 2g IV for surgical prophylaxis

## 2022-05-17 NOTE — PLAN OF CARE
Problem: PAIN - ADULT  Goal: Verbalizes/displays adequate comfort level or baseline comfort level  Description: Interventions:  - Encourage patient to monitor pain and request assistance  - Assess pain using appropriate pain scale  - Administer analgesics based on type and severity of pain and evaluate response  - Implement non-pharmacological measures as appropriate and evaluate response  - Consider cultural and social influences on pain and pain management  - Notify physician/advanced practitioner if interventions unsuccessful or patient reports new pain  Outcome: Progressing     Problem: INFECTION - ADULT  Goal: Absence or prevention of progression during hospitalization  Description: INTERVENTIONS:  - Assess and monitor for signs and symptoms of infection  - Monitor lab/diagnostic results  - Monitor all insertion sites, i e  indwelling lines, tubes, and drains  - Monitor endotracheal if appropriate and nasal secretions for changes in amount and color  - [place] appropriate cooling/warming therapies per order  - Administer medications as ordered  - Instruct and encourage patient and family to use good hand hygiene technique  - Identify and instruct in appropriate isolation precautions for identified infection/condition  Outcome: Progressing     Problem: SAFETY ADULT  Goal: Patient will remain free of falls  Description: INTERVENTIONS:  - Educate patient/family on patient safety including physical limitations  - Instruct patient to call for assistance with activity   - Consult OT/PT to assist with strengthening/mobility   - Keep Call bell within reach  - Keep bed low and locked with side rails adjusted as appropriate  - Keep care items and personal belongings within reach  - Initiate and maintain comfort rounds  - Make Fall Risk Sign visible to staff  - Apply yellow socks and bracelet for high fall risk patients  - Consider moving patient to room near nurses station  Outcome: Progressing  Goal: Maintain or return to baseline ADL function  Description: INTERVENTIONS:  -  Assess patient's ability to carry out ADLs; assess patient's baseline for ADL function and identify physical deficits which impact ability to perform ADLs (bathing, care of mouth/teeth, toileting, grooming, dressing, etc )  - Assess/evaluate cause of self-care deficits   - Assess range of motion  - Assess patient's mobility; develop plan if impaired  - Assess patient's need for assistive devices and provide as appropriate  - Encourage maximum independence but intervene and supervise when necessary  - Involve family in performance of ADLs  - Assess for home care needs following discharge   - Consider OT consult to assist with ADL evaluation and planning for discharge  - Provide patient education as appropriate  Outcome: Progressing  Goal: Maintains/Returns to pre admission functional level  Description: INTERVENTIONS:  - Perform BMAT or MOVE assessment daily    - Set and communicate daily mobility goal to care team and patient/family/caregiver     - Collaborate with rehabilitation services on mobility goals if consulted  - Out of bed for toileting  - Record patient progress and toleration of activity level   Outcome: Progressing     Problem: DISCHARGE PLANNING  Goal: Discharge to home or other facility with appropriate resources  Description: INTERVENTIONS:  - Identify barriers to discharge w/patient and caregiver  - Arrange for needed discharge resources and transportation as appropriate  - Identify discharge learning needs (meds, wound care, etc )  - Arrange for interpretive services to assist at discharge as needed  - Refer to Case Management Department for coordinating discharge planning if the patient needs post-hospital services based on physician/advanced practitioner order or complex needs related to functional status, cognitive ability, or social support system  Outcome: Progressing     Problem: Knowledge Deficit  Goal: Patient/family/caregiver demonstrates understanding of disease process, treatment plan, medications, and discharge instructions  Description: Complete learning assessment and assess knowledge base    Interventions:  - Provide teaching at level of understanding  - Provide teaching via preferred learning methods  Outcome: Progressing     Problem: BIRTH - VAGINAL/ SECTION  Goal: Fetal and maternal status remain reassuring during the birth process  Description: INTERVENTIONS:  - Monitor vital signs  - Monitor fetal heart rate  - Monitor uterine activity  - Monitor labor progression (vaginal delivery)  - DVT prophylaxis  - Antibiotic prophylaxis  Outcome: Progressing  Goal: Emotionally satisfying birthing experience for mother/fetus  Description: Interventions:  - Assess, plan, implement and evaluate the nursing care given to the patient in labor  - Advocate the philosophy that each childbirth experience is a unique experience and support the family's chosen level of involvement and control during the labor process   - Actively participate in both the patient's and family's teaching of the birth process  - Consider cultural, Scientology and age-specific factors and plan care for the patient in labor  Outcome: Progressing

## 2022-05-17 NOTE — PROGRESS NOTES
PNV 38w4d    Patient denies any lof, vb or ctx  Patient has +fm  Patient urine is neg/neg  GBS done today  Having a "Girl"  Delivery consent signed today  Patient has no concerns today

## 2022-05-17 NOTE — ANESTHESIA PREPROCEDURE EVALUATION
Procedure:   SECTION () (N/A Uterus)    Relevant Problems   GYN   (+) 38 weeks gestation of pregnancy   (+) Supervision of high-risk pregnancy, third trimester      NEURO/PSYCH   (+) History of syncope   (+) Personal history of COVID-19      Cardiovascular and Mediastinum   (+) Fetal cardiac anomaly affecting pregnancy, antepartum      Other   (+) Oligohydramnios antepartum, third trimester, not applicable or unspecified fetus        Physical Exam    Airway    Mallampati score: II  TM Distance: >3 FB  Neck ROM: full     Dental       Cardiovascular  Cardiovascular exam normal    Pulmonary  Pulmonary exam normal     Other Findings        Anesthesia Plan  ASA Score- 2     Anesthesia Type- spinal with ASA Monitors  Additional Monitors:   Airway Plan:           Plan Factors-Exercise tolerance (METS): >4 METS  Chart reviewed  EKG reviewed  Imaging results reviewed  Existing labs reviewed  Patient summary reviewed  Patient is not a current smoker  Patient instructed to abstain from smoking on day of procedure  Patient did not smoke on day of surgery  Obstructive sleep apnea risk education given perioperatively  Induction-     Postoperative Plan- Plan for postoperative opioid use  Informed Consent- Anesthetic plan and risks discussed with patient  I personally reviewed this patient with the CRNA  Discussed and agreed on the Anesthesia Plan with the CRNA  Stacy Chen

## 2022-05-18 PROBLEM — Z98.891 STATUS POST PRIMARY LOW TRANSVERSE CESAREAN SECTION: Status: ACTIVE | Noted: 2022-05-18

## 2022-05-18 LAB
DME PARACHUTE DELIVERY DATE ACTUAL: NORMAL
DME PARACHUTE DELIVERY DATE REQUESTED: NORMAL
DME PARACHUTE DELIVERY NOTE: NORMAL
DME PARACHUTE ITEM DESCRIPTION: NORMAL
DME PARACHUTE ORDER STATUS: NORMAL
DME PARACHUTE SUPPLIER NAME: NORMAL
DME PARACHUTE SUPPLIER PHONE: NORMAL
ERYTHROCYTE [DISTWIDTH] IN BLOOD BY AUTOMATED COUNT: 13.3 % (ref 11.6–15.1)
HCT VFR BLD AUTO: 34.5 % (ref 34.8–46.1)
HGB BLD-MCNC: 11.8 G/DL (ref 11.5–15.4)
MCH RBC QN AUTO: 32.9 PG (ref 26.8–34.3)
MCHC RBC AUTO-ENTMCNC: 34.2 G/DL (ref 31.4–37.4)
MCV RBC AUTO: 96 FL (ref 82–98)
PLATELET # BLD AUTO: 211 THOUSANDS/UL (ref 149–390)
PMV BLD AUTO: 12.2 FL (ref 8.9–12.7)
RBC # BLD AUTO: 3.59 MILLION/UL (ref 3.81–5.12)
RPR SER QL: NORMAL
WBC # BLD AUTO: 17.15 THOUSAND/UL (ref 4.31–10.16)

## 2022-05-18 PROCEDURE — 94760 N-INVAS EAR/PLS OXIMETRY 1: CPT

## 2022-05-18 PROCEDURE — 99024 POSTOP FOLLOW-UP VISIT: CPT | Performed by: STUDENT IN AN ORGANIZED HEALTH CARE EDUCATION/TRAINING PROGRAM

## 2022-05-18 PROCEDURE — 94762 N-INVAS EAR/PLS OXIMTRY CONT: CPT

## 2022-05-18 PROCEDURE — 85027 COMPLETE CBC AUTOMATED: CPT

## 2022-05-18 RX ORDER — IBUPROFEN 600 MG/1
600 TABLET ORAL EVERY 6 HOURS SCHEDULED
Status: DISCONTINUED | OUTPATIENT
Start: 2022-05-18 | End: 2022-05-19 | Stop reason: HOSPADM

## 2022-05-18 RX ORDER — DOCUSATE SODIUM 100 MG/1
100 CAPSULE, LIQUID FILLED ORAL 2 TIMES DAILY
Status: DISCONTINUED | OUTPATIENT
Start: 2022-05-18 | End: 2022-05-18 | Stop reason: SDUPTHER

## 2022-05-18 RX ORDER — ONDANSETRON 2 MG/ML
4 INJECTION INTRAMUSCULAR; INTRAVENOUS EVERY 8 HOURS PRN
Status: DISCONTINUED | OUTPATIENT
Start: 2022-05-18 | End: 2022-05-19 | Stop reason: HOSPADM

## 2022-05-18 RX ORDER — SODIUM CHLORIDE, SODIUM LACTATE, POTASSIUM CHLORIDE, CALCIUM CHLORIDE 600; 310; 30; 20 MG/100ML; MG/100ML; MG/100ML; MG/100ML
125 INJECTION, SOLUTION INTRAVENOUS CONTINUOUS
Status: DISCONTINUED | OUTPATIENT
Start: 2022-05-18 | End: 2022-05-19 | Stop reason: HOSPADM

## 2022-05-18 RX ORDER — CYCLOBENZAPRINE HCL 10 MG
10 TABLET ORAL ONCE
Status: COMPLETED | OUTPATIENT
Start: 2022-05-18 | End: 2022-05-18

## 2022-05-18 RX ORDER — CALCIUM CARBONATE 200(500)MG
1000 TABLET,CHEWABLE ORAL DAILY PRN
Status: DISCONTINUED | OUTPATIENT
Start: 2022-05-18 | End: 2022-05-19 | Stop reason: HOSPADM

## 2022-05-18 RX ORDER — DOCUSATE SODIUM 100 MG/1
100 CAPSULE, LIQUID FILLED ORAL 2 TIMES DAILY
Status: DISCONTINUED | OUTPATIENT
Start: 2022-05-18 | End: 2022-05-19 | Stop reason: HOSPADM

## 2022-05-18 RX ORDER — OXYCODONE HYDROCHLORIDE 5 MG/1
10 TABLET ORAL EVERY 4 HOURS PRN
Status: DISCONTINUED | OUTPATIENT
Start: 2022-05-18 | End: 2022-05-19 | Stop reason: HOSPADM

## 2022-05-18 RX ORDER — SIMETHICONE 80 MG
80 TABLET,CHEWABLE ORAL 4 TIMES DAILY PRN
Status: DISCONTINUED | OUTPATIENT
Start: 2022-05-18 | End: 2022-05-19 | Stop reason: HOSPADM

## 2022-05-18 RX ORDER — OXYCODONE HYDROCHLORIDE 5 MG/1
5 TABLET ORAL EVERY 4 HOURS PRN
Status: DISCONTINUED | OUTPATIENT
Start: 2022-05-18 | End: 2022-05-19 | Stop reason: HOSPADM

## 2022-05-18 RX ORDER — ACETAMINOPHEN 325 MG/1
650 TABLET ORAL EVERY 6 HOURS SCHEDULED
Status: DISCONTINUED | OUTPATIENT
Start: 2022-05-18 | End: 2022-05-19 | Stop reason: HOSPADM

## 2022-05-18 RX ORDER — ENOXAPARIN SODIUM 100 MG/ML
40 INJECTION SUBCUTANEOUS
Status: DISCONTINUED | OUTPATIENT
Start: 2022-05-18 | End: 2022-05-19 | Stop reason: HOSPADM

## 2022-05-18 RX ORDER — DIPHENHYDRAMINE HCL 25 MG
25 TABLET ORAL EVERY 6 HOURS PRN
Status: DISCONTINUED | OUTPATIENT
Start: 2022-05-18 | End: 2022-05-19 | Stop reason: HOSPADM

## 2022-05-18 RX ADMIN — ACETAMINOPHEN 650 MG: 325 TABLET ORAL at 09:19

## 2022-05-18 RX ADMIN — ACETAMINOPHEN 650 MG: 325 TABLET ORAL at 20:57

## 2022-05-18 RX ADMIN — Medication 1 TABLET: at 09:19

## 2022-05-18 RX ADMIN — ENOXAPARIN SODIUM 40 MG: 40 INJECTION SUBCUTANEOUS at 09:20

## 2022-05-18 RX ADMIN — DIPHENHYDRAMINE HCL 25 MG: 25 TABLET, COATED ORAL at 00:56

## 2022-05-18 RX ADMIN — DOCUSATE SODIUM 100 MG: 100 CAPSULE, LIQUID FILLED ORAL at 20:56

## 2022-05-18 RX ADMIN — CYCLOBENZAPRINE HYDROCHLORIDE 10 MG: 10 TABLET, FILM COATED ORAL at 21:55

## 2022-05-18 RX ADMIN — SIMETHICONE 80 MG: 80 TABLET, CHEWABLE ORAL at 21:55

## 2022-05-18 RX ADMIN — IBUPROFEN 600 MG: 600 TABLET ORAL at 20:56

## 2022-05-18 RX ADMIN — SODIUM CHLORIDE, SODIUM LACTATE, POTASSIUM CHLORIDE, AND CALCIUM CHLORIDE 125 ML/HR: .6; .31; .03; .02 INJECTION, SOLUTION INTRAVENOUS at 02:50

## 2022-05-18 RX ADMIN — DOCUSATE SODIUM 100 MG: 100 CAPSULE, LIQUID FILLED ORAL at 09:19

## 2022-05-18 RX ADMIN — ACETAMINOPHEN 650 MG: 325 TABLET ORAL at 14:59

## 2022-05-18 RX ADMIN — ACETAMINOPHEN 650 MG: 325 TABLET ORAL at 00:56

## 2022-05-18 NOTE — DISCHARGE SUMMARY
CS Discharge Summary - Leander Sanchez 32 y o  female MRN: [id number]    Unit/Bed#: LD PACU- Encounter: [id number]    Admission Date: 2022     Discharge Date: 2022    Admitting Diagnoses:   Pregnancy at 45 weeks 4 days  Oligohydramnios  Breech presentation  Fetal cardiac anomaly    Discharge Diagnoses:   Same, delivered    Procedures:   primary  section, low transverse incision    Admitting and Delivering Attending: Dr Shoshana Nation  Discharge Attending: Dr Kofi Augustin:     Leander Sanchez is a 32 y o  Ryne Michele was admitted for newly diagnosed oligohydramnios in the setting of breech presentation at 38w4d  Patient was consented for a primary low transverse  section given the above indications  She underwent an uncomplicated  section delivery and delivered a viable female  on 22 at   APGARS were 5, 9 at 1 and 5 minutes, respectively   weighed 7lb 8 8oz  Placenta was delivered shortly after   was then transferred to NICU for known cardiac anomalies  Patient tolerated the procedure well and was transferred to recovery in stable condition  The patient's post partum course was unremarkable    Preoperative hemoglobin was 13 6g/dL, postoperative was 11 8g/dL  Her postoperative pain was well controlled with oral analgesics  On day of discharge, she was ambulating and able to reasonably perform all ADLs  She was voiding and had appropriate bowel function  Pain was well controlled  She was discharged on post-operative day #2 without complications to allow her to be with her baby who was transferred to [facility]  Patient was instructed to follow up with her OB as an outpatient and was given appropriate warnings to call provider if she develops signs of infection or uncontrolled pain       Complications:   None    Condition at discharge:   good     Provisions for Follow-Up Care:  See after visit summary for information related to follow-up care and any pertinent home health orders  Disposition:   Home    Planned Readmission:   No    Discharge Medications:   Please see AVS     Discharge instructions :   Please see AVS     Thais Reddy MD  PGY II, OB/GYN  5/19/2022, 10:38 AM

## 2022-05-18 NOTE — CASE MANAGEMENT
Case Management Progress Note    Patient name Sia Beltran  Location /-66 MRN [id number]  : 1995 Date 2022       LOS (days): 1  Geometric Mean LOS (GMLOS) (days):   Days to GMLOS:        OBJECTIVE:        Current admission status: Inpatient  Preferred Pharmacy:   [id number] [place] [place] - [address]  [facility] [place] [address]  Phone: [phone] Fax: [phone]    [id number] [place] [address]  [address]  [address] [facility], [address] [place] [address]  Phone: [phone] Fax: [phone]    Primary Care Provider: No primary care provider on file  Primary Insurance: BLUE CROSS  Secondary Insurance:     PROGRESS NOTE:  CM was notified by Lactation that an order was placed for a Zomee Breast pump  CM placed order in [place]  Awaiting response from [facility] on insurance verification and delivery

## 2022-05-18 NOTE — LACTATION NOTE
Mom provided with and discussed RBS, Hand expression/2nd night handout and increase supply for NICU baby  Reviewed pumping log and expectations for pumping output in the first week  Reviewed cycle pumping and appropriate pump settings, as well as pumping for 10-15 min 8-12 times per day  Enc Mom to discussed putting baby to the breast with the NICU team when baby is medically stable to do so  Enc her to call for lactation support as needed throughout her stay       Consult placed for Zomee pump

## 2022-05-18 NOTE — ASSESSMENT & PLAN NOTE
Routine postoperative/postpartum care   Meeting milestones   Encourage ambulation   Encourage breastfeeding  DVT chemoprophylaxis with Lovenox 40mg daily

## 2022-05-18 NOTE — ANESTHESIA PROCEDURE NOTES
Spinal Block    Patient location during procedure: OR  Start time: 5/17/2022 7:45 PM  Reason for block: procedure for pain and at surgeon's request  Staffing  Performed: CRNA   Anesthesiologist: Mark Gant MD  Resident/CRNA: Deysi Samson CRNA  Preanesthetic Checklist  Completed: patient identified, IV checked, site marked, risks and benefits discussed, surgical consent, monitors and equipment checked, pre-op evaluation and timeout performed  Spinal Block  Patient position: sitting  Prep: ChloraPrep  Patient monitoring: cardiac monitor and frequent blood pressure checks  Approach: midline  Location: L3-4  Injection technique: single-shot  Needle  Needle type: pencil-tip   Needle gauge: 25 G  Needle length: 10 cm  Assessment  Sensory level: T4  Injection Assessment:  negative aspiration for heme, no paresthesia on injection and positive aspiration for clear CSF    Post-procedure:  adhesive bandage applied, pressure dressing applied, secured with tape, site cleaned and sterile dressing applied

## 2022-05-18 NOTE — OP NOTE
Section Procedure Note    Indications:   Oligohydramnios  Breech Presentation    Pre-operative Diagnosis:   Patient Active Problem List   Diagnosis    History of syncope    Prenatal care in third trimester    COVID-19 affecting pregnancy in second trimester    38 weeks gestation of pregnancy    Back pain affecting pregnancy in second trimester    Fetal cardiac anomaly affecting pregnancy, antepartum    Supervision of high-risk pregnancy, third trimester    Fetal arrhythmia affecting pregnancy, antepartum    Maternal obesity, antepartum, third trimester    Personal history of COVID-19    Oligohydramnios antepartum, third trimester, not applicable or unspecified fetus    Malpresentation before onset of labor       Post-operative Diagnosis:   1LTCS   Patient Active Problem List   Diagnosis    History of syncope    Prenatal care in third trimester    COVID-19 affecting pregnancy in second trimester    38 weeks gestation of pregnancy    Back pain affecting pregnancy in second trimester    Fetal cardiac anomaly affecting pregnancy, antepartum    Supervision of high-risk pregnancy, third trimester    Fetal arrhythmia affecting pregnancy, antepartum    Maternal obesity, antepartum, third trimester    Personal history of COVID-19    Oligohydramnios antepartum, third trimester, not applicable or unspecified fetus    Malpresentation before onset of labor       Attending: Rubén Cardenas MD  Resident: Anat Johns MD    Maternal Findings:  Normal uterus  Normal tubes and ovaries bilaterally  No adhesions     Findings:  Viable female weighing 7lbs 9oz;  Apgar scores of 5 at one minute and 9 at five minutes     Clear amniotic fluid  Normal placenta with 3-vessel cord inserted centrally    Arterial and Venous Gases:  Umbilical Cord Venous Blood Gas:  Results from last 7 days   Lab Units 22   PH COV  7 346   PCO2 COV mm HG 41 3   HCO3 COV mmol/L 22 1   BASE EXC COV mmol/L -3 4*   O2 CT CD VB mL/dL 8 8   O2 HGB, VENOUS CORD % 54 7     Umbilical Cord Arterial Blood Gas:  Results from last 7 days   Lab Units 22   PH COA  7 267   PCO2 COA  55 8   PO2 COA mm HG 10 9   HCO3 COA mmol/L 24 9   BASE EXC COA mmol/L -3 0*   O2 CONTENT CORD ART ml/dl 2 9   O2 HGB, ARTERIAL CORD % 14 0       Specimens: Arterial and venous cord gases, cord blood, segment of umbilical cord, placenta to storage    Quantitative Blood Loss: 651 mL    Drains: Vaz catheter           Complications:  None; patient tolerated the procedure well  Disposition: PACU            Condition: stable    Brief Labor Course:   Patient was admitted for newly diagnosed oligohydramnios in the setting of breech presentation at 38w4d  Patient was consented for a primary low transverse  section given the above indications  Breech presentation was confirmed in triage  Procedure Details   The patient was seen prior to the procedure  Risks, benefits, possible complications, alternate treatment options, and expected outcomes were discussed with the patient  The patient agreed with the proposed plan and gave informed consent for a 1LTCS  The patient was taken to the [facility] Operating Room where she received spinal anesthesia  For infection prophylaxis, she received 2g Ancef preoperatively  Fetal heart tones in the OR were assessed and noted to be within normal limits and a Vaz catheter and SCDs were placed  The abdomen was prepped with Chloraprep, the vagina was prepped with Betadine, and following appropriate drying time, the patient was draped in the usual sterile manner  A Time Out was held and the above information confirmed  The patient was identified as Moni Ng and the procedure verified as a  Delivery for oligohydramnios and breech presentation      A Pfannenstiel incision was made and carried down through the underlying subcutaneous tissue to the fascia using a scalpel  The rectus fascia was then nicked in the midline and dissected laterally using Crump scissors  The superior edge of the  fascial incision was grasped with Kocher clamps bilaterally, tented upward and the underlying rectus muscles were dissected off sharply with Crump scissors  This was repeated on the inferior edge of the fascia and dissected down to the pubic rami  The rectus muscles were  and the peritoneum was identified, entered, and extended longitudinally with blunt dissection  The bladder blade was inserted  A low transverse uterine incision was made with the scalpel and extended laterally with blunt dissection  The amnion was entered bluntly  Surgeons hand was inserted through the hysterotomy and the fetal sacrum was palpated and unable to be elevated  Fetal left foot was palpated and delivered through the uterine incision with the assistance of gentle fundal pressure, followed by the contralateral foot  Fetal body was delivered to the level of the scapula, after which time the legs delivered spontaneously  The left, anterior arm was palpated, elbow flexed, and arm swept down and across the chest for delivery  The contralateral arm was palpated and delivered in similar fashion  The fetal zygomatic arches were palpated and fetal head was flexed for delivery  There was noted to have good tone  There was no apparent injury to the   The umbilical cord was doubly clamped and cut after 30 seconds to allow for delayed cord clamping  The infant was handed off to the  providers  Arterial and venous cord gases, cord blood, and a segment of umbilical cord were obtained for evaluation  The placenta delivered spontaneously with uterine fundal massage and appeared normal  The uterus was exteriorized and cleaned out with a moist lap sponge  The uterine incision was closed with a running locked suture of 0 Vicryl   A second layer of the same suture was used to imbricate the first   Hemostasis was noted to be excellent  The uterus was returned to the abdomen  The paracolic gutters were inspected and cleared of all clots and debris with irrigation and moist lap sponges  The fascia was closed with a running suture of 0 Vicryl  Subcutaneous adipose tissue was closed with a running suture of 2-0 Plain gut  The skin was closed with a subcuticular running suture of 4-0 Monocryl  The patient appeared to tolerate the procedure very well  Lap sponge, needle, and instrument counts were correct x2  The patient was transferred to her postpartum recovery room in stable condition and her infant went to the NICU due to known cardiac anomalies  Attending Attestation: Dr Leif Gaston MD was present for the entire procedure  Nayely Sy MD  OB/GYN PGY-1  5/17/2022  9:08 PM

## 2022-05-18 NOTE — PLAN OF CARE
Problem: PAIN - ADULT  Goal: Verbalizes/displays adequate comfort level or baseline comfort level  Description: Interventions:  - Encourage patient to monitor pain and request assistance  - Assess pain using appropriate pain scale  - Administer analgesics based on type and severity of pain and evaluate response  - Implement non-pharmacological measures as appropriate and evaluate response  - Consider cultural and social influences on pain and pain management  - Notify physician/advanced practitioner if interventions unsuccessful or patient reports new pain  Outcome: Progressing     Problem: INFECTION - ADULT  Goal: Absence or prevention of progression during hospitalization  Description: INTERVENTIONS:  - Assess and monitor for signs and symptoms of infection  - Monitor lab/diagnostic results  - Monitor all insertion sites, i e  indwelling lines, tubes, and drains  - Monitor endotracheal if appropriate and nasal secretions for changes in amount and color  - [place] appropriate cooling/warming therapies per order  - Administer medications as ordered  - Instruct and encourage patient and family to use good hand hygiene technique  - Identify and instruct in appropriate isolation precautions for identified infection/condition  Outcome: Progressing     Problem: SAFETY ADULT  Goal: Patient will remain free of falls  Description: INTERVENTIONS:  - Educate patient/family on patient safety including physical limitations  - Instruct patient to call for assistance with activity   - Consult OT/PT to assist with strengthening/mobility   - Keep Call bell within reach  - Keep bed low and locked with side rails adjusted as appropriate  - Keep care items and personal belongings within reach  - Initiate and maintain comfort rounds  - Make Fall Risk Sign visible to staff  - Apply yellow socks and bracelet for high fall risk patients  - Consider moving patient to room near nurses station  Outcome: Progressing  Goal: Maintain or return to baseline ADL function  Description: INTERVENTIONS:  -  Assess patient's ability to carry out ADLs; assess patient's baseline for ADL function and identify physical deficits which impact ability to perform ADLs (bathing, care of mouth/teeth, toileting, grooming, dressing, etc )  - Assess/evaluate cause of self-care deficits   - Assess range of motion  - Assess patient's mobility; develop plan if impaired  - Assess patient's need for assistive devices and provide as appropriate  - Encourage maximum independence but intervene and supervise when necessary  - Involve family in performance of ADLs  - Assess for home care needs following discharge   - Consider OT consult to assist with ADL evaluation and planning for discharge  - Provide patient education as appropriate  Outcome: Progressing  Goal: Maintains/Returns to pre admission functional level  Description: INTERVENTIONS:  - Perform BMAT or MOVE assessment daily    - Set and communicate daily mobility goal to care team and patient/family/caregiver     - Collaborate with rehabilitation services on mobility goals if consulted  - Out of bed for toileting  - Record patient progress and toleration of activity level   Outcome: Progressing     Problem: DISCHARGE PLANNING  Goal: Discharge to home or other facility with appropriate resources  Description: INTERVENTIONS:  - Identify barriers to discharge w/patient and caregiver  - Arrange for needed discharge resources and transportation as appropriate  - Identify discharge learning needs (meds, wound care, etc )  - Arrange for interpretive services to assist at discharge as needed  - Refer to Case Management Department for coordinating discharge planning if the patient needs post-hospital services based on physician/advanced practitioner order or complex needs related to functional status, cognitive ability, or social support system  Outcome: Progressing     Problem: Knowledge Deficit  Goal: Patient/family/caregiver demonstrates understanding of disease process, treatment plan, medications, and discharge instructions  Description: Complete learning assessment and assess knowledge base    Interventions:  - Provide teaching at level of understanding  - Provide teaching via preferred learning methods  Outcome: Progressing     Problem: POSTPARTUM  Goal: Experiences normal postpartum course  Description: INTERVENTIONS:  - Monitor maternal vital signs  - Assess uterine involution and lochia  Outcome: Progressing  Goal: Appropriate maternal -  bonding  Description: INTERVENTIONS:  - Identify family support  - Assess for appropriate maternal/infant bonding   -Encourage maternal/infant bonding opportunities  - Referral to  or  as needed  Outcome: Progressing  Goal: Establishment of infant feeding pattern  Description: INTERVENTIONS:  - Assess breast/bottle feeding  - Refer to lactation as needed  Outcome: Progressing  Goal: Incision(s), wounds(s) or drain site(s) healing without S/S of infection  Description: INTERVENTIONS  - Assess and document dressing, incision, wound bed, drain sites and surrounding tissue  - Provide patient and family education  - Perform skin care/dressing changes every  Outcome: Progressing

## 2022-05-18 NOTE — ANESTHESIA PREPROCEDURE EVALUATION
Procedure:   SECTION () (N/A Uterus)    Relevant Problems   GYN   (+) 38 weeks gestation of pregnancy   (+) Supervision of high-risk pregnancy, third trimester      NEURO/PSYCH   (+) History of syncope   (+) Personal history of COVID-19        Physical Exam    Airway    Mallampati score: II  TM Distance: >3 FB  Neck ROM: full     Dental       Cardiovascular  Cardiovascular exam normal    Pulmonary  Pulmonary exam normal     Other Findings        Anesthesia Plan  ASA Score- 2     Anesthesia Type- spinal with ASA Monitors  Additional Monitors:   Airway Plan:           Plan Factors-Exercise tolerance (METS): >4 METS  Chart reviewed  EKG reviewed  Imaging results reviewed  Existing labs reviewed  Patient summary reviewed  Patient is not a current smoker  Patient instructed to abstain from smoking on day of procedure  Patient did not smoke on day of surgery  Obstructive sleep apnea risk education given perioperatively  Induction-     Postoperative Plan- Plan for postoperative opioid use  Informed Consent- Anesthetic plan and risks discussed with patient  I personally reviewed this patient with the CRNA  Discussed and agreed on the Anesthesia Plan with the CRNA  Antonio Larose

## 2022-05-18 NOTE — PROGRESS NOTES
Obstetrics Progress Note  Joseline Mccarty 32 y o  female MRN: [id number]  Unit/Bed#:  315-01 Encounter: [id number]    Assessment/Plan:  Postoperative day #1 s/p primary low transverse  delivery for breech, oligohydramnios  Stable  Baby in NICU  By issue:  * Status post primary low transverse  section  Assessment & Plan  Routine postoperative/postpartum care   Hgb 13 6g/dL --> FU am CBC   Rowe to be removed later today   Encourage ambulation   Encourage breastfeeding  DVT chemoprophylaxis with Lovenox 40mg daily pending CBC        Anticipate discharge postoperative day 3-4  Subjective/Objective   Chief Complaint:   Post delivery  Subjective:   Pain: controlled  Tolerating PO: yes  Voiding: rowe in place  Flatus: no  Bowel Movement: no  Ambulating: not yet  Chest pain: no  Shortness of breath: no  Leg pain: no  Lochia: within normal limits  Infant feeding: pumping  Objective:   Vitals:   Temp:  [98 °F (36 7 °C)-98 4 °F (36 9 °C)] 98 4 °F (36 9 °C)  HR:  [63-80] 70  Resp:  [18] 18  BP: ()/(51-80) 89/51     Intake/Output Summary (Last 24 hours) at 2022 0457  Last data filed at 2022 0130  Gross per 24 hour   Intake 800 ml   Output 1101 ml   Net -301 ml       Physical Exam:   -General: alert and oriented x 3, in no apparent distress  -Cardiovascular: regular rate   -Pulmonary: normal effort, no respiratory distress  Clear to auscultation bilaterally  -Abdomen/Pelvis: soft, non-tender, non-distended, no rebound or guarding  Uterine fundus firm and non-tender, at the umbilicus  Incision:  Pressure dressing applied is clean dry and intact  Incision clean intact  -Extremities: Nontender    Lab Results   Component Value Date    WBC 8 23 2022    HGB 13 6 2022    HCT 39 8 2022    MCV 97 2022     2022         Thais Willoughby MD  PGY-II, OBGYN  2022, 4:57 AM

## 2022-05-18 NOTE — PLAN OF CARE
Problem: PAIN - ADULT  Goal: Verbalizes/displays adequate comfort level or baseline comfort level  Description: Interventions:  - Encourage patient to monitor pain and request assistance  - Assess pain using appropriate pain scale  - Administer analgesics based on type and severity of pain and evaluate response  - Implement non-pharmacological measures as appropriate and evaluate response  - Consider cultural and social influences on pain and pain management  - Notify physician/advanced practitioner if interventions unsuccessful or patient reports new pain  Outcome: Progressing     Problem: INFECTION - ADULT  Goal: Absence or prevention of progression during hospitalization  Description: INTERVENTIONS:  - Assess and monitor for signs and symptoms of infection  - Monitor lab/diagnostic results  - Monitor all insertion sites, i e  indwelling lines, tubes, and drains  - Monitor endotracheal if appropriate and nasal secretions for changes in amount and color  - [place] appropriate cooling/warming therapies per order  - Administer medications as ordered  - Instruct and encourage patient and family to use good hand hygiene technique  - Identify and instruct in appropriate isolation precautions for identified infection/condition  Outcome: Progressing     Problem: SAFETY ADULT  Goal: Patient will remain free of falls  Description: INTERVENTIONS:  - Educate patient/family on patient safety including physical limitations  - Instruct patient to call for assistance with activity   - Consult OT/PT to assist with strengthening/mobility   - Keep Call bell within reach  - Keep bed low and locked with side rails adjusted as appropriate  - Keep care items and personal belongings within reach  - Initiate and maintain comfort rounds  - Make Fall Risk Sign visible to staff  - Apply yellow socks and bracelet for high fall risk patients  - Consider moving patient to room near nurses station  Outcome: Progressing  Goal: Maintain or return to baseline ADL function  Description: INTERVENTIONS:  -  Assess patient's ability to carry out ADLs; assess patient's baseline for ADL function and identify physical deficits which impact ability to perform ADLs (bathing, care of mouth/teeth, toileting, grooming, dressing, etc )  - Assess/evaluate cause of self-care deficits   - Assess range of motion  - Assess patient's mobility; develop plan if impaired  - Assess patient's need for assistive devices and provide as appropriate  - Encourage maximum independence but intervene and supervise when necessary  - Involve family in performance of ADLs  - Assess for home care needs following discharge   - Consider OT consult to assist with ADL evaluation and planning for discharge  - Provide patient education as appropriate  Outcome: Progressing  Goal: Maintains/Returns to pre admission functional level  Description: INTERVENTIONS:  - Perform BMAT or MOVE assessment daily    - Set and communicate daily mobility goal to care team and patient/family/caregiver     - Collaborate with rehabilitation services on mobility goals if consulted  - Out of bed for toileting  - Record patient progress and toleration of activity level   Outcome: Progressing     Problem: DISCHARGE PLANNING  Goal: Discharge to home or other facility with appropriate resources  Description: INTERVENTIONS:  - Identify barriers to discharge w/patient and caregiver  - Arrange for needed discharge resources and transportation as appropriate  - Identify discharge learning needs (meds, wound care, etc )  - Arrange for interpretive services to assist at discharge as needed  - Refer to Case Management Department for coordinating discharge planning if the patient needs post-hospital services based on physician/advanced practitioner order or complex needs related to functional status, cognitive ability, or social support system  Outcome: Progressing     Problem: Knowledge Deficit  Goal: Patient/family/caregiver demonstrates understanding of disease process, treatment plan, medications, and discharge instructions  Description: Complete learning assessment and assess knowledge base    Interventions:  - Provide teaching at level of understanding  - Provide teaching via preferred learning methods  Outcome: Progressing     Problem: POSTPARTUM  Goal: Experiences normal postpartum course  Description: INTERVENTIONS:  - Monitor maternal vital signs  - Assess uterine involution and lochia  Outcome: Progressing  Goal: Appropriate maternal -  bonding  Description: INTERVENTIONS:  - Identify family support  - Assess for appropriate maternal/infant bonding   -Encourage maternal/infant bonding opportunities  - Referral to  or  as needed  Outcome: Progressing  Goal: Establishment of infant feeding pattern  Description: INTERVENTIONS:  - Assess breast/bottle feeding  - Refer to lactation as needed  Outcome: Progressing  Goal: Incision(s), wounds(s) or drain site(s) healing without S/S of infection  Description: INTERVENTIONS  - Assess and document dressing, incision, wound bed, drain sites and surrounding tissue  - Provide patient and family education  - Perform skin care/dressing changes every come: Progressing

## 2022-05-18 NOTE — PLAN OF CARE
Problem: PAIN - ADULT  Goal: Verbalizes/displays adequate comfort level or baseline comfort level  Description: Interventions:  - Encourage patient to monitor pain and request assistance  - Assess pain using appropriate pain scale  - Administer analgesics based on type and severity of pain and evaluate response  - Implement non-pharmacological measures as appropriate and evaluate response  - Consider cultural and social influences on pain and pain management  - Notify physician/advanced practitioner if interventions unsuccessful or patient reports new pain  5/18/2022 0954 by Mayte Heath RN  Outcome: Progressing  5/18/2022 0911 by Mayte Heath RN  Outcome: Progressing     Problem: INFECTION - ADULT  Goal: Absence or prevention of progression during hospitalization  Description: INTERVENTIONS:  - Assess and monitor for signs and symptoms of infection  - Monitor lab/diagnostic results  - Monitor all insertion sites, i e  indwelling lines, tubes, and drains  - Monitor endotracheal if appropriate and nasal secretions for changes in amount and color  - [place] appropriate cooling/warming therapies per order  - Administer medications as ordered  - Instruct and encourage patient and family to use good hand hygiene technique  - Identify and instruct in appropriate isolation precautions for identified infection/condition  5/18/2022 0954 by Mayte Heath RN  Outcome: Progressing  5/18/2022 0911 by Mayte Heath RN  Outcome: Progressing     Problem: SAFETY ADULT  Goal: Patient will remain free of falls  Description: INTERVENTIONS:  - Educate patient/family on patient safety including physical limitations  - Instruct patient to call for assistance with activity   - Consult OT/PT to assist with strengthening/mobility   - Keep Call bell within reach  - Keep bed low and locked with side rails adjusted as appropriate  - Keep care items and personal belongings within reach  - Initiate and maintain comfort rounds  - Make Fall Risk Sign visible to staff  - Apply yellow socks and bracelet for high fall risk patients  - Consider moving patient to room near nurses station  5/18/2022 0954 by Nevaeh Patel RN  Outcome: Progressing  5/18/2022 0911 by Nevaeh Patel RN  Outcome: Progressing  Goal: Maintain or return to baseline ADL function  Description: INTERVENTIONS:  -  Assess patient's ability to carry out ADLs; assess patient's baseline for ADL function and identify physical deficits which impact ability to perform ADLs (bathing, care of mouth/teeth, toileting, grooming, dressing, etc )  - Assess/evaluate cause of self-care deficits   - Assess range of motion  - Assess patient's mobility; develop plan if impaired  - Assess patient's need for assistive devices and provide as appropriate  - Encourage maximum independence but intervene and supervise when necessary  - Involve family in performance of ADLs  - Assess for home care needs following discharge   - Consider OT consult to assist with ADL evaluation and planning for discharge  - Provide patient education as appropriate  5/18/2022 0954 by Nevaeh Patel RN  Outcome: Progressing  5/18/2022 0911 by Nevaeh Patel RN  Outcome: Progressing  Goal: Maintains/Returns to pre admission functional level  Description: INTERVENTIONS:  - Perform BMAT or MOVE assessment daily    - Set and communicate daily mobility goal to care team and patient/family/caregiver     - Collaborate with rehabilitation services on mobility goals if consulted  - Out of bed for toileting  - Record patient progress and toleration of activity level   5/18/2022 0954 by Nevaeh Patel RN  Outcome: Progressing  5/18/2022 0911 by Nevaeh Patel RN  Outcome: Progressing     Problem: DISCHARGE PLANNING  Goal: Discharge to home or other facility with appropriate resources  Description: INTERVENTIONS:  - Identify barriers to discharge w/patient and caregiver  - Arrange for needed discharge resources and transportation as appropriate  - Identify discharge learning needs (meds, wound care, etc )  - Arrange for interpretive services to assist at discharge as needed  - Refer to Case Management Department for coordinating discharge planning if the patient needs post-hospital services based on physician/advanced practitioner order or complex needs related to functional status, cognitive ability, or social support system  2022 by Reyna Lambert RN  Outcome: Progressing  2022 by Reyna Lambert RN  Outcome: Progressing     Problem: Knowledge Deficit  Goal: Patient/family/caregiver demonstrates understanding of disease process, treatment plan, medications, and discharge instructions  Description: Complete learning assessment and assess knowledge base    Interventions:  - Provide teaching at level of understanding  - Provide teaching via preferred learning methods  2022 by Reyna Lambert RN  Outcome: Progressing  2022 by Reyna Lambert RN  Outcome: Progressing     Problem: POSTPARTUM  Goal: Experiences normal postpartum course  Description: INTERVENTIONS:  - Monitor maternal vital signs  - Assess uterine involution and lochia  2022 by Reyna Lambert RN  Outcome: Progressing  2022 by Reyna Lambert RN  Outcome: Progressing  Goal: Appropriate maternal -  bonding  Description: INTERVENTIONS:  - Identify family support  - Assess for appropriate maternal/infant bonding   -Encourage maternal/infant bonding opportunities  - Referral to  or  as needed  2022 [phone] by Reyna Lambert RN  Outcome: Progressing  2022 by Reyna Lambert RN  Outcome: Progressing  Goal: Establishment of infant feeding pattern  Description: INTERVENTIONS:  - Assess breast/bottle feeding  - Refer to lactation as needed  2022 by Reyna Lambert RN  Outcome: Progressing  2022 by Reyna Lambert RN  Outcome: Progressing  Goal: Incision(s), wounds(s) or drain site(s) healing without S/S of infection  Description: INTERVENTIONS  - Assess and document dressing, incision, wound bed, drain sites and surrounding tissue  - Provide patient and family education  - Perform skin care/dressing changes every 18/2022 0954 by Nevaeh Patel, RN  Outcome: Progressing  5/18/2022 0911 by Nevaeh Patel RN  Outcome: Progressing

## 2022-05-18 NOTE — ANESTHESIA POSTPROCEDURE EVALUATION
Post-Op Assessment Note    CV Status:  Stable  Pain Score: 0    Pain management: adequate     Mental Status:  Alert and awake   Hydration Status:  Euvolemic   PONV Controlled:  Controlled   Airway Patency:  Patent      Post Op Vitals Reviewed: Yes      Staff: CRNA         No complications documented      /68 (05/17/22 2115)    Temp   98 4   Pulse 63 (05/17/22 2115)   Resp 18 (05/17/22 2115)    SpO2 100 % (05/17/22 2115)

## 2022-05-18 NOTE — CASE MANAGEMENT
CM was notified by [facility] liaison that the requested Breast Pump was delivered to the patient at bedside

## 2022-05-18 NOTE — PLAN OF CARE
Problem: PAIN - ADULT  Goal: Verbalizes/displays adequate comfort level or baseline comfort level  Description: Interventions:  - Encourage patient to monitor pain and request assistance  - Assess pain using appropriate pain scale  - Administer analgesics based on type and severity of pain and evaluate response  - Implement non-pharmacological measures as appropriate and evaluate response  - Consider cultural and social influences on pain and pain management  - Notify physician/advanced practitioner if interventions unsuccessful or patient reports new pain  Outcome: Progressing     Problem: INFECTION - ADULT  Goal: Absence or prevention of progression during hospitalization  Description: INTERVENTIONS:  - Assess and monitor for signs and symptoms of infection  - Monitor lab/diagnostic results  - Monitor all insertion sites, i e  indwelling lines, tubes, and drains  - Monitor endotracheal if appropriate and nasal secretions for changes in amount and color  - [place] appropriate cooling/warming therapies per order  - Administer medications as ordered  - Instruct and encourage patient and family to use good hand hygiene technique  - Identify and instruct in appropriate isolation precautions for identified infection/condition  Outcome: Progressing     Problem: SAFETY ADULT  Goal: Patient will remain free of falls  Description: INTERVENTIONS:  - Educate patient/family on patient safety including physical limitations  - Instruct patient to call for assistance with activity   - Consult OT/PT to assist with strengthening/mobility   - Keep Call bell within reach  - Keep bed low and locked with side rails adjusted as appropriate  - Keep care items and personal belongings within reach  - Initiate and maintain comfort rounds  - Make Fall Risk Sign visible to staff  - Apply yellow socks and bracelet for high fall risk patients  - Consider moving patient to room near nurses station  Outcome: Progressing  Goal: Maintain or return to baseline ADL function  Description: INTERVENTIONS:  -  Assess patient's ability to carry out ADLs; assess patient's baseline for ADL function and identify physical deficits which impact ability to perform ADLs (bathing, care of mouth/teeth, toileting, grooming, dressing, etc )  - Assess/evaluate cause of self-care deficits   - Assess range of motion  - Assess patient's mobility; develop plan if impaired  - Assess patient's need for assistive devices and provide as appropriate  - Encourage maximum independence but intervene and supervise when necessary  - Involve family in performance of ADLs  - Assess for home care needs following discharge   - Consider OT consult to assist with ADL evaluation and planning for discharge  - Provide patient education as appropriate  Outcome: Progressing  Goal: Maintains/Returns to pre admission functional level  Description: INTERVENTIONS:  - Perform BMAT or MOVE assessment daily    - Set and communicate daily mobility goal to care team and patient/family/caregiver     - Collaborate with rehabilitation services on mobility goals if consulted  - Out of bed for toileting  - Record patient progress and toleration of activity level   Outcome: Progressing     Problem: DISCHARGE PLANNING  Goal: Discharge to home or other facility with appropriate resources  Description: INTERVENTIONS:  - Identify barriers to discharge w/patient and caregiver  - Arrange for needed discharge resources and transportation as appropriate  - Identify discharge learning needs (meds, wound care, etc )  - Arrange for interpretive services to assist at discharge as needed  - Refer to Case Management Department for coordinating discharge planning if the patient needs post-hospital services based on physician/advanced practitioner order or complex needs related to functional status, cognitive ability, or social support system  Outcome: Progressing

## 2022-05-19 VITALS
HEART RATE: 73 BPM | DIASTOLIC BLOOD PRESSURE: 64 MMHG | OXYGEN SATURATION: 95 % | SYSTOLIC BLOOD PRESSURE: 115 MMHG | RESPIRATION RATE: 18 BRPM | TEMPERATURE: 97.5 F

## 2022-05-19 LAB — GP B STREP DNA SPEC QL NAA+PROBE: NEGATIVE

## 2022-05-19 PROCEDURE — 99024 POSTOP FOLLOW-UP VISIT: CPT | Performed by: STUDENT IN AN ORGANIZED HEALTH CARE EDUCATION/TRAINING PROGRAM

## 2022-05-19 RX ORDER — OXYCODONE HYDROCHLORIDE 5 MG/1
5 TABLET ORAL EVERY 4 HOURS PRN
Qty: 5 TABLET | Refills: 0 | Status: SHIPPED | OUTPATIENT
Start: 2022-05-19 | End: 2022-05-29

## 2022-05-19 RX ORDER — ACETAMINOPHEN 325 MG/1
650 TABLET ORAL EVERY 6 HOURS SCHEDULED
Qty: 14 TABLET | Refills: 0 | Status: SHIPPED | OUTPATIENT
Start: 2022-05-19 | End: 2022-05-21

## 2022-05-19 RX ORDER — DOCUSATE SODIUM 100 MG/1
100 CAPSULE, LIQUID FILLED ORAL 2 TIMES DAILY
Qty: 30 CAPSULE | Refills: 0 | Status: SHIPPED | OUTPATIENT
Start: 2022-05-19

## 2022-05-19 RX ORDER — IBUPROFEN 600 MG/1
600 TABLET ORAL EVERY 6 HOURS SCHEDULED
Qty: 30 TABLET | Refills: 0 | Status: SHIPPED | OUTPATIENT
Start: 2022-05-19

## 2022-05-19 RX ADMIN — OXYCODONE HYDROCHLORIDE 10 MG: 5 TABLET ORAL at 09:22

## 2022-05-19 RX ADMIN — Medication 1 TABLET: at 09:22

## 2022-05-19 RX ADMIN — IBUPROFEN 600 MG: 600 TABLET ORAL at 09:22

## 2022-05-19 RX ADMIN — ACETAMINOPHEN 650 MG: 325 TABLET ORAL at 14:30

## 2022-05-19 RX ADMIN — ACETAMINOPHEN 650 MG: 325 TABLET ORAL at 09:22

## 2022-05-19 RX ADMIN — OXYCODONE HYDROCHLORIDE 10 MG: 5 TABLET ORAL at 14:30

## 2022-05-19 RX ADMIN — ENOXAPARIN SODIUM 40 MG: 40 INJECTION SUBCUTANEOUS at 09:23

## 2022-05-19 RX ADMIN — DOCUSATE SODIUM 100 MG: 100 CAPSULE, LIQUID FILLED ORAL at 09:23

## 2022-05-19 RX ADMIN — IBUPROFEN 600 MG: 600 TABLET ORAL at 14:30

## 2022-05-19 NOTE — PROGRESS NOTES
Obstetrics Progress Note  Joseline Mccarty 32 y o  female MRN: [id number]  Unit/Bed#:  315-01 Encounter: [id number]    Assessment/Plan:  Postoperative day #2 s/p primary low transverse  delivery  Stable  Baby in NICU  By issue:  * Status post primary low transverse  section  Assessment & Plan  Routine postoperative/postpartum care   Meeting milestones   Encourage ambulation   Encourage breastfeeding  DVT chemoprophylaxis with Lovenox 40mg daily        Anticipate discharge postoperative day 4  Subjective/Objective   Chief Complaint:   Post delivery  Subjective:   Pain: controlled  Tolerating PO: yes  Voiding: yes  Flatus: yes  Bowel Movement: no  Ambulating: yes  Chest pain: no  Shortness of breath: no  Leg pain: no  Lochia: within normal limits  Infant feeding: pumping  Objective:   Vitals:   Temp:  [97 5 °F (36 4 °C)-98 4 °F (36 9 °C)] 98 1 °F (36 7 °C)  HR:  [56-71] 62  Resp:  [18-20] 18  BP: (109-126)/(67-78) 110/71     Intake/Output Summary (Last 24 hours) at 2022 6058  Last data filed at 2022 1345  Gross per 24 hour   Intake --   Output 2050 ml   Net -2050 ml       Physical Exam:   -General: alert and oriented x 3, in no apparent distress  -Cardiovascular: regular rate   -Pulmonary: normal effort  -Abdomen/Pelvis: soft, non-tender, non-distended, no rebound or guarding  Uterine fundus firm and non-tender, -1 cm below the umbilicus  Incision: clean, dry and intact  -Extremities: Nontender    Lab Results   Component Value Date    WBC 17 15 (H) 2022    HGB 11 8 2022    HCT 34 5 (L) 2022    MCV 96 2022     2022         Thais Willoughby MD  PGY-II, OBGYN  2022, 6:13 AM

## 2022-05-19 NOTE — PLAN OF CARE
Problem: PAIN - ADULT  Goal: Verbalizes/displays adequate comfort level or baseline comfort level  Description: Interventions:  - Encourage patient to monitor pain and request assistance  - Assess pain using appropriate pain scale  - Administer analgesics based on type and severity of pain and evaluate response  - Implement non-pharmacological measures as appropriate and evaluate response  - Consider cultural and social influences on pain and pain management  - Notify physician/advanced practitioner if interventions unsuccessful or patient reports new pain  Outcome: Progressing     Problem: INFECTION - ADULT  Goal: Absence or prevention of progression during hospitalization  Description: INTERVENTIONS:  - Assess and monitor for signs and symptoms of infection  - Monitor lab/diagnostic results  - Monitor all insertion sites, i e  indwelling lines, tubes, and drains  - Monitor endotracheal if appropriate and nasal secretions for changes in amount and color  - [place] appropriate cooling/warming therapies per order  - Administer medications as ordered  - Instruct and encourage patient and family to use good hand hygiene technique  - Identify and instruct in appropriate isolation precautions for identified infection/condition  Outcome: Progressing     Problem: SAFETY ADULT  Goal: Patient will remain free of falls  Description: INTERVENTIONS:  - Educate patient/family on patient safety including physical limitations  - Instruct patient to call for assistance with activity   - Consult OT/PT to assist with strengthening/mobility   - Keep Call bell within reach  - Keep bed low and locked with side rails adjusted as appropriate  - Keep care items and personal belongings within reach  - Initiate and maintain comfort rounds  - Make Fall Risk Sign visible to staff  - Apply yellow socks and bracelet for high fall risk patients  - Consider moving patient to room near nurses station  Outcome: Progressing  Goal: Maintain or return to baseline ADL function  Description: INTERVENTIONS:  -  Assess patient's ability to carry out ADLs; assess patient's baseline for ADL function and identify physical deficits which impact ability to perform ADLs (bathing, care of mouth/teeth, toileting, grooming, dressing, etc )  - Assess/evaluate cause of self-care deficits   - Assess range of motion  - Assess patient's mobility; develop plan if impaired  - Assess patient's need for assistive devices and provide as appropriate  - Encourage maximum independence but intervene and supervise when necessary  - Involve family in performance of ADLs  - Assess for home care needs following discharge   - Consider OT consult to assist with ADL evaluation and planning for discharge  - Provide patient education as appropriate  Outcome: Progressing  Goal: Maintains/Returns to pre admission functional level  Description: INTERVENTIONS:  - Perform BMAT or MOVE assessment daily    - Set and communicate daily mobility goal to care team and patient/family/caregiver     - Collaborate with rehabilitation services on mobility goals if consulted  - Out of bed for toileting  - Record patient progress and toleration of activity level   Outcome: Progressing     Problem: DISCHARGE PLANNING  Goal: Discharge to home or other facility with appropriate resources  Description: INTERVENTIONS:  - Identify barriers to discharge w/patient and caregiver  - Arrange for needed discharge resources and transportation as appropriate  - Identify discharge learning needs (meds, wound care, etc )  - Arrange for interpretive services to assist at discharge as needed  - Refer to Case Management Department for coordinating discharge planning if the patient needs post-hospital services based on physician/advanced practitioner order or complex needs related to functional status, cognitive ability, or social support system  Outcome: Progressing     Problem: Knowledge Deficit  Goal: Patient/family/caregiver demonstrates understanding of disease process, treatment plan, medications, and discharge instructions  Description: Complete learning assessment and assess knowledge base    Interventions:  - Provide teaching at level of understanding  - Provide teaching via preferred learning methods  Outcome: Progressing     Problem: POSTPARTUM  Goal: Experiences normal postpartum course  Description: INTERVENTIONS:  - Monitor maternal vital signs  - Assess uterine involution and lochia  Outcome: Progressing  Goal: Appropriate maternal -  bonding  Description: INTERVENTIONS:  - Identify family support  - Assess for appropriate maternal/infant bonding   -Encourage maternal/infant bonding opportunities  - Referral to  or  as needed  Outcome: Progressing  Goal: Establishment of infant feeding pattern  Description: INTERVENTIONS:  - Assess breast/bottle feeding  - Refer to lactation as needed  Outcome: Progressing  Goal: Incision(s), wounds(s) or drain site(s) healing without S/S of infection  Description: INTERVENTIONS  - Assess and document dressing, incision, wound bed, drain sites and surrounding tissue  - Provide patient and family education  - Perform skin care/dressing changes every tcome: Progressing

## 2022-05-19 NOTE — PLAN OF CARE
Problem: PAIN - ADULT  Goal: Verbalizes/displays adequate comfort level or baseline comfort level  Description: Interventions:  - Encourage patient to monitor pain and request assistance  - Assess pain using appropriate pain scale  - Administer analgesics based on type and severity of pain and evaluate response  - Implement non-pharmacological measures as appropriate and evaluate response  - Consider cultural and social influences on pain and pain management  - Notify physician/advanced practitioner if interventions unsuccessful or patient reports new pain  5/19/2022 0228 by Teresa Ortiz RN  Outcome: Progressing  5/19/2022 0228 by Teresa Ortiz RN  Outcome: Progressing     Problem: INFECTION - ADULT  Goal: Absence or prevention of progression during hospitalization  Description: INTERVENTIONS:  - Assess and monitor for signs and symptoms of infection  - Monitor lab/diagnostic results  - Monitor all insertion sites, i e  indwelling lines, tubes, and drains  - Monitor endotracheal if appropriate and nasal secretions for changes in amount and color  - [place] appropriate cooling/warming therapies per order  - Administer medications as ordered  - Instruct and encourage patient and family to use good hand hygiene technique  - Identify and instruct in appropriate isolation precautions for identified infection/condition  5/19/2022 0228 by Teresa Ortiz RN  Outcome: Progressing  5/19/2022 0228 by Teresa Ortiz RN  Outcome: Progressing     Problem: SAFETY ADULT  Goal: Patient will remain free of falls  Description: INTERVENTIONS:  - Educate patient/family on patient safety including physical limitations  - Instruct patient to call for assistance with activity   - Consult OT/PT to assist with strengthening/mobility   - Keep Call bell within reach  - Keep bed low and locked with side rails adjusted as appropriate  - Keep care items and personal belongings within reach  - Initiate and maintain comfort rounds  - Make Fall Risk Sign visible to staff  - Apply yellow socks and bracelet for high fall risk patients  - Consider moving patient to room near nurses station  5/19/2022 0228 by Rama Mock RN  Outcome: Progressing  5/19/2022 0228 by Rama Mock RN  Outcome: Progressing  Goal: Maintain or return to baseline ADL function  Description: INTERVENTIONS:  -  Assess patient's ability to carry out ADLs; assess patient's baseline for ADL function and identify physical deficits which impact ability to perform ADLs (bathing, care of mouth/teeth, toileting, grooming, dressing, etc )  - Assess/evaluate cause of self-care deficits   - Assess range of motion  - Assess patient's mobility; develop plan if impaired  - Assess patient's need for assistive devices and provide as appropriate  - Encourage maximum independence but intervene and supervise when necessary  - Involve family in performance of ADLs  - Assess for home care needs following discharge   - Consider OT consult to assist with ADL evaluation and planning for discharge  - Provide patient education as appropriate  5/19/2022 0228 by Rama Mock RN  Outcome: Progressing  5/19/2022 0228 by Rama Mock RN  Outcome: Progressing  Goal: Maintains/Returns to pre admission functional level  Description: INTERVENTIONS:  - Perform BMAT or MOVE assessment daily    - Set and communicate daily mobility goal to care team and patient/family/caregiver     - Collaborate with rehabilitation services on mobility goals if consulted  - Out of bed for toileting  - Record patient progress and toleration of activity level   5/19/2022 0228 by Rama Mock RN  Outcome: Progressing  5/19/2022 0228 by Rama Mock RN  Outcome: Progressing     Problem: DISCHARGE PLANNING  Goal: Discharge to home or other facility with appropriate resources  Description: INTERVENTIONS:  - Identify barriers to discharge w/patient and caregiver  - Arrange for needed discharge resources and transportation as appropriate  - Identify discharge learning needs (meds, wound care, etc )  - Arrange for interpretive services to assist at discharge as needed  - Refer to Case Management Department for coordinating discharge planning if the patient needs post-hospital services based on physician/advanced practitioner order or complex needs related to functional status, cognitive ability, or social support system  2022 by Bran Santa RN  Outcome: Progressing  2022 by Bran Santa RN  Outcome: Progressing     Problem: Knowledge Deficit  Goal: Patient/family/caregiver demonstrates understanding of disease process, treatment plan, medications, and discharge instructions  Description: Complete learning assessment and assess knowledge base    Interventions:  - Provide teaching at level of understanding  - Provide teaching via preferred learning methods  2022 by Bran Santa RN  Outcome: Progressing  2022 by Bran Santa RN  Outcome: Progressing     Problem: POSTPARTUM  Goal: Experiences normal postpartum course  Description: INTERVENTIONS:  - Monitor maternal vital signs  - Assess uterine involution and lochia  2022 by Bran Santa RN  Outcome: Progressing  2022 by Bran Santa RN  Outcome: Progressing  Goal: Appropriate maternal -  bonding  Description: INTERVENTIONS:  - Identify family support  - Assess for appropriate maternal/infant bonding   -Encourage maternal/infant bonding opportunities  - Referral to  or  as needed  2022 by Bran Santa RN  Outcome: Progressing  2022 by Bran Santa RN  Outcome: Progressing  Goal: Establishment of infant feeding pattern  Description: INTERVENTIONS:  - Assess breast/bottle feeding  - Refer to lactation as needed  2022 by Bran Santa RN  Outcome: Progressing  2022 by Bran Santa RN  Outcome: Progressing  Goal: Incision(s), wounds(s) or drain site(s) healing without S/S of infection  Description: INTERVENTIONS  - Assess and document dressing, incision, wound bed, drain sites and surrounding tissue  - Provide patient and family education  - Perform skin care/dressing changes every   5/19/2022 0228 by Axel Evans RN  Outcome: Progressing  5/19/2022 0228 by Axel Evans, RN  Outcome: Progressing

## 2022-05-26 ENCOUNTER — TELEPHONE (OUTPATIENT)
Dept: OBGYN CLINIC | Facility: CLINIC | Age: 27
End: 2022-05-26

## 2022-05-26 NOTE — TELEPHONE ENCOUNTER
Pt had a c section with MAUREEN on 5/17  She is not having any issues, no bleeding or anything, just burning around the incision   She was supposed to follow up in one week, but her daughter is recovering at [address] and not sure when she will be home/ pt would like to know when she should schedule a post op    Please advise

## 2022-05-30 ENCOUNTER — NURSE TRIAGE (OUTPATIENT)
Dept: OTHER | Facility: OTHER | Age: 27
End: 2022-05-30

## 2022-05-30 NOTE — TELEPHONE ENCOUNTER
Reason for Disposition   [1] MILD-MODERATE post-op pain (e g , pain scale 1-7) AND [2] not controlled with pain medications    Answer Assessment - Initial Assessment Questions  1  SYMPTOM: "What's the main symptom you're concerned about?" (e g , pain, fever, vomiting)      pain  2  ONSET: "When did the pain start?"      today  3  DATE of : "When was  performed?"         4  ANESTHESIA: "What type of anesthesia did you have? (e g , general, spinal, epidural, local)      n/a  5  PAIN: "Is there any pain?" If Yes, ask: "How bad is it?"  (Scale 1-10; or mild, moderate, severe)      Burning pain 7/10 but not sure if she pulled something  6  FEVER: "Do you have a fever?" If Yes, ask: "What is your temperature, how was it measured, and when did it start?"      denies  7  VOMITING: "Is there any vomiting?" If yes, ask: "How many times?"      denies  8  BLEEDING: "Is there any bleeding?" If Yes, ask: "How much?" and "Where?"      Denies   9   OTHER SYMPTOMS: "Do you have any other symptoms?" (e g , drainage from wound, painful urination, constipation)      denies    Protocols used: POSTPARTUM -  [facility]

## 2022-05-30 NOTE — TELEPHONE ENCOUNTER
Regarding: post  pain  ----- Message from Samuel Hassan RN sent at 2022 11:22 AM EDT -----  Post  pain    Started last night

## 2022-05-30 NOTE — TELEPHONE ENCOUNTER
TC sent to Dr Donna Hoover for SLOGA-"s/p csection on 5/17 was leaning in back seat of car last night felt small pop with burning sensation since then pain 7/10  She just got home last night form [facility] with baby and does not want to come in for evaluation, requesting appt for tomorrow since she was not able to be seen in office for post op appt last week   Is it ok to schedule her?"

## 2022-05-31 ENCOUNTER — POSTPARTUM VISIT (OUTPATIENT)
Dept: OBGYN CLINIC | Facility: CLINIC | Age: 27
End: 2022-05-31

## 2022-05-31 VITALS
WEIGHT: 194 LBS | BODY MASS INDEX: 32.32 KG/M2 | HEIGHT: 65 IN | SYSTOLIC BLOOD PRESSURE: 122 MMHG | DIASTOLIC BLOOD PRESSURE: 70 MMHG

## 2022-05-31 DIAGNOSIS — Z98.891 STATUS POST C-SECTION: Primary | ICD-10-CM

## 2022-05-31 PROCEDURE — 99024 POSTOP FOLLOW-UP VISIT: CPT | Performed by: OBSTETRICS & GYNECOLOGY

## 2022-05-31 NOTE — PATIENT INSTRUCTIONS
Section   WHAT YOU SHOULD KNOW:   A  delivery, or , is abdominal surgery to deliver your baby  There are many reasons you may need a   A  may be scheduled before labor if you had a  with your last baby  It may be scheduled if your baby is not positioned normally, or you are pregnant with more than 1 baby  Your caregiver may perform an emergency  during labor to prevent life-threatening complications for you or your baby  A  may be done if your cervix does not dilate after several hours of active labor  Other reasons for a  include maternal infections and problems with the placenta  AFTER YOU LEAVE:   Medicines:   Prescription pain medicine  may be given  Ask how to take this medicine safely  Acetaminophen  decreases pain and fever  It is available without a doctor's order  Ask how much to take and how often to take it  Follow directions  Acetaminophen can cause liver damage if not taken correctly  NSAIDs  help decrease swelling and pain or fever  This medicine is available with or without a doctor's order  NSAIDs can cause stomach bleeding or kidney problems in certain people  If you take blood thinner medicine, always ask your obstetrician if NSAIDs are safe for you  Always read the medicine label and follow directions  Take your medicine as directed  Contact your obstetrician (OB) if you think your medicine is not helping or if you have side effects  Tell him if you are allergic to any medicine  Keep a list of the medicines, vitamins, and herbs you take  Include the amounts, and when and why you take them  Bring the list or the pill bottles to follow-up visits  Carry your medicine list with you in case of an emergency  Follow up with your OB as directed: You may need to return to have your stitches or staples removed  Write down your questions so you remember to ask them during your visits    Wound care: Carefully wash your wound with soap and water every day  Keep your wound clean and dry  Wear loose, comfortable clothes that do not rub against your wound  Ask your OB about bathing and showering  Drink plenty of liquids: You can lower your risk for a blood clot if you drink plenty of liquids  Ask how much liquid to drink each day and which liquids are best for you  Limit activity until you have fully recovered from surgery:   Ask when it is safe for you to drive, walk up stairs, lift heavy objects, and have sex  Ask when it is okay to exercise, and what types of exercise to do  Start slowly and do more as you get stronger  Contact your OB if:   You have heavy vaginal bleeding that fills 1 or more sanitary pads in 1 hour  You have a fever  Your incision is swollen, red, or draining pus  You have questions or concerns about yourself or your baby  Seek care immediately or call 911 if:   Blood soaks through your bandage  Your stitches come apart  You feel lightheaded, short of breath, and have chest pain  You cough up blood  Your arm or leg feels warm, tender, and painful  It may look swollen and red  [address] is for End User's use only and may not be sold, redistributed or otherwise used for commercial purposes  All illustrations and images included in CareNotes® are the copyrighted property of A D A M , Inc  or Nicholas Owens  The above information is an  only  It is not intended as medical advice for individual conditions or treatments  Talk to your doctor, nurse or pharmacist before following any medical regimen to see if it is safe and effective for you  Postpartum Bleeding   AMBULATORY CARE:   What you need to know about postpartum bleeding:  Postpartum bleeding is vaginal bleeding after childbirth  This bleeding is normal, whether your baby was born vaginally or by    It contains blood and the tissue that lined the inside of your uterus when you were pregnant  What to expect with postpartum bleeding:  Postpartum bleeding usually lasts at least 10 days, and may last longer than 6 weeks  Your bleeding may range from light (barely staining a pad) to heavy (soaking a pad in 1 hour)  Usually, you have heavier bleeding right after childbirth, which slows over the next few weeks until it stops  The bleeding is red or dark brown with clots for the first 1 to 3 days  It then turns pink for several days, and then becomes a white or yellow discharge until it ends  Call your local emergency number (911 in the [address]) if:   You are suddenly short of breath and feel lightheaded  You have sudden chest pain  You are breathing faster than normal     Your heart is beating faster than normal     Call your doctor or obstetrician if:   Your bleeding increases, or you have heavy bleeding that soaks a pad in 1 hour for 2 hours in a row  You have a fever  You pass large blood clots  You feel dizzy  You have a low back ache, abdominal pain or tenderness, or loss of appetite  You are urinating less than usual, or not at all  You have questions or concerns about your condition or care  What to expect with postpartum bleeding:  Postpartum bleeding usually lasts at least 10 days, and may last longer than 6 weeks  Your bleeding may range from light (barely staining a pad) to heavy (soaking a pad in 1 hour)  Usually, you have heavier bleeding right after childbirth, which slows over the next few weeks until it stops  The bleeding is red or dark brown with clots for the first 1 to 3 days  It then turns pink for several days, and then becomes a white or yellow discharge until it ends  Follow up with your obstetrician as directed:  Do not have sex until your obstetrician says it is okay  Write down your questions so you remember to ask them during your visits    © Copyright [employer] 2022 Information is for End User's use only and may not be sold, redistributed or otherwise used for commercial purposes  All illustrations and images included in CareNotes® are the copyrighted property of A D A M , Inc  or Alma Diego  The above information is an  only  It is not intended as medical advice for individual conditions or treatments  Talk to your doctor, nurse or pharmacist before following any medical regimen to see if it is safe and effective for you  Postpartum Depression   AMBULATORY CARE:   Postpartum depression  is a mood disorder that occurs after your baby is born  Your symptoms may last up to 12 months after delivery  Your symptoms may become serious and affect your daily activities and relationships  The exact cause is not known  Hormone levels that increased during pregnancy suddenly drop after your baby is born  This can cause your symptoms  A past episode of postpartum depression or a family history of depression may increase your risk  A  may also be done if you are pregnant with more than 1 baby  Postpartum depression may also be trigged by a lack of support at home, stress, or medical problems  Common signs and symptoms include the following:   Feeling sad, anxious, tearful, discouraged, hopeless, or alone    Thoughts that you are not a good mother    Trouble completing daily tasks, concentrating, or remembering things    Lack of appetite    Lack interest in your baby    Feeling restless, irritable, or withdrawn    An overwhelmed feeling with your new baby and a belief that it will not get better    Feeling unimportant or guilty most of the time    Trouble sleeping, even after the baby is asleep    Call your local emergency number (911 in the [address]) if:   You think about hurting yourself or your baby  Seek care immediately if:   Your feelings of depression or sadness are strong  Call your doctor if:   Your symptoms last most of the day for days in a row      Your symptoms last more than 1 week  You have questions or concerns about your condition or care  How postpartum depression is diagnosed:  Healthcare providers will talk to you about how you are feeling and ask if you have any depression  These talks can happen during appointments for your medical care and for your baby's care, such as well child visits  Talk to your providers about the following:  When you started to feel depressed, and if it is getting worse over time    Problems you are having with daily activities, sleep, or caring for your baby    If anything makes your depression worse, or makes you feel better    Feeling that you are not bonding with your baby the way you want    Any problems your baby has with sleeping or feeding    If your baby is fussy or cries a lot    Support you have from friends, family, or others    Treatment  may include any of the following:  A therapist  can help you find ways to cope with your feelings  This can be done alone or in a group  Antidepressants  help decrease or stop your symptoms  Self-care: You may feel better quickly, or if may take a few weeks to feel better  Be patient with yourself  Do the following to take care of yourself:  Rest as needed  Take a nap or rest while your baby sleeps  Ask someone to watch your baby while you nap  Get emotional support  Share your feelings with your partner, a friend, or another mother  Ask your partner, friends, or family to help with cooking or cleaning  Do only what is needed and let other things wait until later  Exercise when you can  Even 5 or 10 minutes of exercise can help improve your mood  Walk around the block or do some stretching exercises  Eat a variety of healthy foods  Healthy foods include fruits, vegetables, whole-grain breads, low-fat dairy products, beans, lean meats, and fish  Do not skip meals  Take care of yourself  Shower and dress each day   Write in a journal  Celebrate small achievements, even if it is only 1 thing a day  Try to get out of the house a little each day  Meet a friend for coffee  Follow up with your doctor as directed: Your doctor may refer you to a therapist or other specialist  Write down your questions so you remember to ask them during your visits  © [employer] 2022 Information is for End User's use only and may not be sold, redistributed or otherwise used for commercial purposes  All illustrations and images included in CareNotes® are the copyrighted property of A D A M , Inc  or Alma Win   The above information is an  only  It is not intended as medical advice for individual conditions or treatments  Talk to your doctor, nurse or pharmacist before following any medical regimen to see if it is safe and effective for you  Breastfeeding and Breast Engorgement   AMBULATORY CARE:   What you need to know about breast engorgement:  Breast engorgement develops when too much milk builds up in your breast  It is normal for your breasts to feel swollen, heavy, and tender when your milk comes in  This is called breast fullness  When your breast starts to feel painful and hard, the fullness has developed into engorgement  Breast engorgement usually happens 3 to 5 days after you give birth  Engorgement can happen if you are not breastfeeding or expressing milk often, or produce a lot of milk  Your baby may have a hard time latching on (attaching) to your breast to feed  Without treatment, engorgement can lead to plugged milk ducts or a breast infection called mastitis  Common symptoms include the following: A swollen, tender breast    A breast that feels hard to the touch or looks tight or shiny    Warm, red, or throbbing breast    Flat nipple    A low fever    Seek care immediately if:   You have a fever with chills or body aches  You have pain and swelling in one or both breasts that keeps you from breastfeeding      Contact your healthcare provider if:   You have a tender breast lump that grows slowly and usually forms on one side of your breast     You have a small, white bump on your nipple  Your symptoms do not get better within 24 hours  You have questions or concerns about your condition or care  Manage your symptoms:   Breastfeed or pump every 2 or 3 hours  Frequent breastfeeding helps decrease engorgement discomfort  Express or pump milk from your breasts before you breastfeed  This will help soften your breast and your nipple, and allow your baby to latch on better  Massage your breast   Breast massage helps empty your engorged breast and decrease pain  Gently massage your breast before and during breastfeeding to help increase your milk flow  Gently stroke your breast, starting from the outer areas and working your way toward the nipple  Breast massage may also help prevent breast engorgement if done in the first few days after you give birth  Apply a cool compress in between feedings  The cold may help decrease swelling and pain in your engorged breast  Wet a washcloth in cold water, wring it out, and place it on your breast  Ask how long and how often to use a cool compress  Wear a supportive bra  The bra should fit well but not be too tight  Prevent breast engorgement:   Help your baby get a good latch  Hold the nape of his or her neck to help him or her latch onto your breast  Touch his or her top lip with your nipple and wait for him or her to open his or her mouth wide  Your baby's lower lip and chin should touch the areola (dark area around the nipple) first  Help him or her get as much of the areola in his or her mouth as possible  You should feel as if your baby will not separate from your breast easily  Gently break suction and reposition if your baby is only sucking on the nipple  Talk to a lactation consultant if you need help with your baby's latch  Empty your breasts completely    Take your time when you breastfeed to allow your baby to empty your breast  Try not to switch breasts too early  Express or pump after you breastfeed if your baby is not emptying your breasts when he or she feeds  Apply warmth to your breast before you breastfeed  Put a warm, wet cloth on your breast or take a warm shower  This can help increase your milk flow  Follow up with your doctor as directed:  Write down your questions so you remember to ask them during your visits  For more information:   [employer] [address]  [place] , [address]  Phone: [phone]  Web Address: http://[employer]/    [facility]  [address]   [facility] Stephanie Vasquez  Phone: 5- 539 - [phone]  Phone: 0- [phone]  Web Address: http://[employer] [facility]/  [facility] [address] 2022 Information is for End User's use only and may not be sold, redistributed or otherwise used for commercial purposes  All illustrations and images included in CareNotes® are the copyrighted property of [employer]  or [address] [place] [address] [employer] [facility]  The above information is an  only  It is not intended as medical advice for individual conditions or treatments  Talk to your doctor, nurse or pharmacist before following any medical regimen to see if it is safe and effective for you  Mastitis   AMBULATORY CARE:   Mastitis  is an infection of breast tissue that most often occurs in women who breastfeed  It can happen any time during breastfeeding, but usually occurs within the first 3 months after giving birth  Usually only one breast is affected  Common signs and symptoms include the following:   Chills and fever    Breast swelling, redness, warmth, and tenderness     Tenderness under your arm    Fatigue and body aches    Contact your healthcare provider if:   Your symptoms do not get better within 2 days      You have a painful lump in your breast      You have swollen and tender lymph nodes in your armpit on the same side as the affected breast     You have questions or concerns about your condition or care  Treatment:  You can continue to breastfeed your baby while you are being treated for mastitis  Breastfeeding when you have mastitis may help speed your recovery  You may need any of the following:  Antibiotics  help treat or prevent a bacterial infection  Acetaminophen  decreases pain and fever  It is available without a doctor's order  Ask how much to take and how often to take it  Follow directions  Acetaminophen can cause liver damage if not taken correctly  NSAIDs , such as ibuprofen, help decrease swelling, pain, and fever  This medicine is available with or without a doctor's order  NSAIDs can cause stomach bleeding or kidney problems in certain people  If you take blood thinner medicine, always ask your healthcare provider if NSAIDs are safe for you  Always read the medicine label and follow directions  Incision and drainage  may be needed if the swelling does not go away and an abscess forms  Your healthcare provider will make a small incision in your breast to drain the pus  Manage your symptoms:   Continue to breastfeed from the affected breast   This will help to prevent an abscess from forming  Breastfeed your baby on the affected side first  Apply a warm, wet cloth on your breast or take a warm shower before you feed your baby  This can help increase your milk flow  If it is painful when you breastfeed from the affected breast, feed your baby from the other breast first  Pump the affected side to completely drain your breast after breastfeeding, if needed  You may save the pumped milk to feed your baby  Use different positions to breastfeed  Change the position of your baby during feedings  This may help to relieve your discomfort  Apply heat on your breast for 20 to 30 minutes every 2 hours for as many days as directed  Heat helps decrease pain  Apply ice after feedings  Apply ice on your breast for 15 to 20 minutes every hour or as directed  Use an ice pack, or put crushed ice in a plastic bag  Cover it with a towel  Ice helps prevent tissue damage and decreases swelling and pain  Massage your breast   Gently massage your breast before and during breastfeeding to help drain your milk  Drink liquids as directed  Ask how much liquid to drink each day and which liquids are best for you  Rest as needed  Do not sleep on your stomach until your infection is gone  Prevent mastitis:   Breastfeed every 2 or 3 hours to prevent engorgement  Breast engorgement develops when too much milk builds up in your breast  Take your time when you breastfeed to allow your baby to empty your breast  Try not to switch breasts too early  Express or pump after you breastfeed if your baby is not emptying your breasts when he or she feeds  Prevent sore and cracked nipples  A good latch prevents sore and cracked nipples  If you have sore nipples after breastfeeding, your baby may not be latched on properly  Gently break suction and reposition if your baby is only sucking on the nipple  Talk to a lactation consultant if you need help with your baby's latch  Care for your breasts  Keep your nipples clean and dry between feedings  Check them for cracks, blisters, or other irritated areas  Ask a lactation specialist or your healthcare provider how to treat sore and cracked nipples  Wash your hands before and after you breastfeed your baby or pump your breasts  Wear a comfortable nursing bra that supports your breasts but is not too tight  Follow up with your doctor as directed:  Write down your questions so you remember to ask them during your visits  © [employer] 2022 Information is for End User's use only and may not be sold, redistributed or otherwise used for commercial purposes   All illustrations and images included in CareNotes® are the copyrighted property of A D A M , Inc  or [address]   The above information is an  only  It is not intended as medical advice for individual conditions or treatments  Talk to your doctor, nurse or pharmacist before following any medical regimen to see if it is safe and effective for you  Self Care After Delivery   AMBULATORY CARE:   The postpartum period  is the period of time from delivery to about 6 weeks  During this time you may experience many physical and emotional changes  It is important to understand what is normal and when you need to call your healthcare provider  It is also important to know how to care for yourself during this time  Call your local emergency number (911 in the [address]) for any of the following: You see or hear things that are not there, or have thoughts of harming yourself or your baby  You soak through 1 pad in 15 minutes, have blurry vision, clammy or pale skin, and feel faint  You faint or lose consciousness  You have trouble breathing  You cough up blood  Your  incision comes apart  Seek care immediately if:   Your heart is beating faster than usual     You have a bad headache or changes in your vision  Your episiotomy or  incision is red, swollen, bleeding, or draining pus  You have severe abdominal pain  Call your doctor or obstetrician if:   Your leg is painful, red, and larger than usual     You soak through 1 or more pads in an hour, or pass blood clots larger than a quarter from your vagina  You have a fever  You have new or worsening pain in your abdomen or vagina  You continue to have depression 1 to 2 weeks after you deliver  You have trouble sleeping  You have foul-smelling discharge from your vagina  You have pain or burning when you urinate  You do not have a bowel movement for 3 days or more  You have nausea or are vomiting      You have hard lumps or red streaks over your breasts  You have cracked nipples or bleed from your nipples  You have questions or concerns about your condition or care  Physical changes: The following are normal changes after you give birth:  Pain in the area between your anus and vagina    Breast pain    Constipation or hemorrhoids    Hot or cold flashes    Vaginal bleeding or discharge    Mild to moderate abdominal cramping    Difficulty controlling bowel movements or urine    Emotional changes:  A drop in hormone levels after you deliver may cause changes in your emotions  You may feel irritable, sad, or anxious  You may cry easily or for no reason  You may also feel depressed  Depression that continues can be a sign of postpartum depression, a condition that can be treated  Treatment may include talk therapy, medicines, or both  Healthcare providers will ask how you are feeling and if you have any depression  These talks can happen during appointments for your medical care and for your baby's care, such as well child visits  Providers can help you find ways to care for yourself and your baby  Talk to your providers about the following:  When emotional changes or depression started, and if it is getting worse over time    Problems you are having with daily activities, sleep, or caring for your baby    If anything makes you feel worse, or makes you feel better    Feeling that you are not bonding with your baby the way you want    Any problems your baby has with sleeping or feeding    Your baby is fussy or cries a lot    Support you have from friends, family, or others    Breast care for breastfeeding mothers: You may have sore breasts for 3 to 6 days after you give birth  This happens as your milk begins to fill your breasts  You may also have sore breasts if you do not breastfeed frequently  Do the following to care for your breasts:  Apply a moist, warm, compress to your breast as directed  This may help soothe your breasts   Make sure the washcloth is not too hot before you apply it to your breast     Nurse your baby or pump your milk frequently  This may prevent clogged milk ducts  Ask your healthcare provider how often to nurse or pump  Massage your breasts as directed  This may help increase your milk flow  Gently rub your breasts in a circular motion before you breastfeed  You may need to gently squeeze your breast or nipple to help release milk  You can also use a breast pump to help release milk from your breast     Wash your breasts with warm water only  Do not put soap on your nipples  Soap may cause your nipples to become dry  Apply lanolin cream to your nipples as directed  Lanolin cream may add moisture to your skin and prevent nipple dryness  Always  wash off lanolin cream with warm water before you breastfeed  Place pads in your bra  Your nipples may leak milk when you are not breastfeeding  You can place pads inside of your bra to help prevent leaking onto your clothing  Ask your healthcare provider where to purchase bra pads  Get breastfeeding support if needed  Healthcare providers can answer questions about breastfeeding and provide you with support  Ask your healthcare provider who you can contact if you need breastfeeding support  Breast care for non-breastfeeding mothers:  Milk will fill your breasts even if you bottle feed your baby  Do the following to help stop your milk from filling your breasts and causing pain:  Wear a bra with support at all times  A sports bra or a tight-fitting bra will help stop your milk from coming in  Apply ice on each breast for 15 to 20 minutes every hour or as directed  Use an ice pack, or put crushed ice in a plastic bag  Cover it with a towel before you apply it to your breast  Ice helps your milk ducts shrink  Keep your breasts away from warm water  Warm water will make it easier for milk to fill your breasts   Stand with your breasts away from warm water in the shower  Limit how much you touch your breasts  This will prevent them from filling with milk  Perineum care: Your perineum is the area between your rectum and vagina  It is normal to have swelling and pain in this area after you give birth  If you had an episiotomy, your healthcare provider may give you special instructions  Clean your perineum after you use the bathroom  This may prevent infection and help with healing  Use a spray bottle with warm water to clean your perineum  You may also gently spray warm water against your perineum when you urinate  Always wipe front to back  Take a sitz bath as directed  A sitz bath may help relieve swelling and pain  Fill your bath tub or bucket with water up to your hips and sit in the water  Use cold water for 2 days after you deliver  Then use warm water  Ask your healthcare provider for more information about a sitz bath  Apply ice packs for the first 24 hours or as directed  Use a plastic glove filled with ice or buy an ice pack  Wrap the ice pack or plastic glove in a small towel or wash cloth  Place the ice pack on your perineum for 20 minutes at a time  Sit on a donut-shaped pillow  This may relieve pressure on your perineum when you sit  Use wipes that contain medicine or take pills as directed  Your healthcare provider may tell you to use witch hazel pads  You can place witch hazel pads in the refrigerator before you apply them to your perineum  Your provider may also tell you to take NSAIDs  Ask him or her how often to take pills or use the wipes  Do not go swimming or take tub baths for 4 to 6 weeks or as directed  This will help prevent an infection in your vagina or uterus  Bowel and bladder care: It may take 3 to 5 days to have a bowel movement after you deliver your baby  You can do the following to prevent or manage constipation, and get control of your bowel or bladder:  Take stool softeners as directed    A stool softener is medicine that will make your bowel movements softer  This may prevent or relieve constipation  A stool softener may also make bowel movements less painful  Drink plenty of liquids  Ask how much liquid to drink each day and which liquids are best for you  Liquids may help prevent constipation  Eat foods high in fiber  Examples include fruits, vegetables, grains, beans, and lentils  Ask your healthcare provider how much fiber you need each day  Fiber may prevent constipation  Do Kegel exercises as directed  Kegel exercises will help strengthen the muscles that control bowel movements and urination  Ask your healthcare provider for more information on Kegel exercises  Apply cold compresses or medicine to hemorrhoids as directed  This may relieve swelling and pain  Your healthcare provider may tell you to apply ice or wipes that contain medicine to your hemorrhoids  He or she may also tell you to use a sitz bath  Ask your provider for more information on how to manage hemorrhoids  Nutrition:  Good nutrition is important in the postpartum period  It will help you return to a healthy weight, increase your energy levels, and prevent constipation  It will also help you get enough nutrients and calories if you are going to breastfeed your baby  Eat a variety of healthy foods  Healthy foods include fruits, vegetables, whole-grain breads, low-fat dairy products, beans, lean meats, and fish  You may need 500 to 700 extra calories each day if you breastfeed your baby  You may also need extra protein  Limit foods with added sugar and high amounts of fat  These foods are high in calories and low in healthy nutrients  Read food labels so you know how much sugar and fat is in the food you want to eat  Drink 8 to 10 glasses of water per day  Water will help you make plenty of milk for your baby  It will also help prevent constipation   Drink a glass of water every time you breastfeed your baby     Take vitamins as directed  Ask your healthcare provider what vitamins you need  Limit caffeine and alcohol if you are breastfeeding  Caffeine and alcohol can get into your breast milk  Caffeine and alcohol can make your baby fussy  They can also interfere with your baby's sleep  Ask your healthcare provider if you can drink alcohol or caffeine  Rest and sleep: You may feel very tired in the postpartum period  Enough sleep will help you heal and give you energy to care for your baby  The following may help you get sleep and rest:  Nap when your baby naps  Your baby may nap several times during the day  Get rest during this time  Limit visitors  Many people may want to see you and your baby  Ask friends or family to visit on different days  This will give you time to rest     Do not plan too much for one day  Put off household chores so that you have time to rest  Gradually do more each day  Ask for help from family, friends, or neighbors  Ask them to help you with laundry, cleaning, or errands  Also ask someone to watch the baby while you take a nap or relax  Ask your partner to help with the care of your baby  Pump some of your breast milk so your partner can feed your baby during the night  Exercise after delivery:  Wait until your healthcare provider says it is okay to exercise  Exercise can help you lose weight, increase your energy levels, and manage your mood  It can also prevent constipation and blood clots  Start with gentle exercises such as walking  Do more as you have more energy  You may need to avoid abdominal exercises for 1 to 2 weeks after you deliver  Talk to your healthcare provider about an exercise plan that is right for you  Sexual activity after delivery:   Do not have sex until your healthcare provider says it is okay  You may need to wait 4 to 6 weeks before you have sex   This may prevent infection and allow time to heal     Your menstrual cycle may begin as soon as 3 weeks after you deliver  Your period may be delayed if you breastfeed your baby  You can become pregnant before you get your first postpartum period  Talk to your healthcare provider about birth control that is right for you  Some types of birth control are not safe during breastfeeding  For support and more information:  Join a support group for new mothers  Ask for help from family and friends with chores, errands, and care of your baby  Office of [place] Gonzael[employer], [facility]  [address], [id number] [place] [address]  [address], [address] [place] [address]  Phone: [phone]  Web Address: www womenshealth gov    [facility] Postpartum [address] [facility] , [address] [place] [address] Road  [address] [facility] , [address] [place] [address]  Web Address: [email or web address]    Follow up with your doctor or obstetrician as directed: You will need to follow up within 2 to 6 weeks of delivery  Write down your questions so you remember to ask them at your visits  © Copyright [employer] 2022 Information is for End User's use only and may not be sold, redistributed or otherwise used for commercial purposes  All illustrations and images included in CareNotes® are the copyrighted property of A D A M , Inc  or [facility]   The above information is an  only  It is not intended as medical advice for individual conditions or treatments  Talk to your doctor, nurse or pharmacist before following any medical regimen to see if it is safe and effective for you  Caring for Your Baby   WHAT YOU NEED TO KNOW:   Care for your baby includes keeping him or her safe, clean, and comfortable  Your baby will cry or make noises to let you know when he or she needs something  You will learn to tell what your baby needs by the way he or she cries   Your baby will move in certain ways when he or she needs something, such as sucking on a fist when hungry  DISCHARGE INSTRUCTIONS:   Call your local emergency number (911 in the [address]) if:   You feel like hurting your baby  Call your baby's pediatrician if:   Your baby's abdomen is hard and swollen, even when he or she is calm and resting  You feel depressed and cannot take care of your baby  Your baby's lips or mouth are blue and he or she is breathing faster than usual     Your baby's armpit temperature is higher than 99°F (37 2°C)  Your baby's eyes are red, swollen, or draining yellow pus  Your baby coughs often during the day, or chokes during each feeding  Your baby does not want to eat  Your baby cries more than usual and you cannot calm him or her down  Your baby's skin turns yellow or he or she has a rash  You have questions or concerns about caring for your baby  What to feed your baby:   Breast milk is the only food your baby needs for the first 6 months of life  If possible, only breastfeed (no formula) him or her for the first 6 months  Breastfeeding is recommended for at least the first year of your baby's life, even when he or she starts eating food  You may pump your breasts and feed breast milk from a bottle  You may feed your baby formula from a bottle if breastfeeding is not possible  Talk to your baby's pediatrician about the best formula for your baby  He or she can help you choose one that contains iron  Do not add cereal to the milk or formula  Your baby may get too many calories during a feeding  You can make more if your baby is still hungry after he or she finishes a bottle  How much to feed your baby: Your baby may want different amounts each day  The amount of formula or breast milk your baby drinks may change with each feeding and each day  The amount your baby drinks depends on his or her weight, how fast he or she is growing, and how hungry he or she is   Your baby may want to drink a lot one day and not want to drink much the next  Do not overfeed your baby  Overfeeding means your baby gets too many calories during a feeding  This may cause him or her to gain weight too fast  Your baby may also continue to overeat later in life  Look for signs that your baby is done feeding  Your baby may look around instead of watching you  He or she may chew on the nipple of the bottle rather than suck on it  He or she may also cry and try to wriggle away from the bottle or out of the high chair  Feed your baby each time he or she is hungry:      Babies up to 2 months old  will drink about 2 to 4 ounces at each feeding  He or she will probably want to drink every 3 to 4 hours  Wake your baby to feed him or her if he or she sleeps longer than 4 to 5 hours  Babies 2 to 10 months old  should drink 4 to 5 bottles each day  He or she will drink 4 to 6 ounces at each feeding  When your baby is 2 to 1 months old, he or she may begin to sleep through the night  When this happens, you may stop waking up to give your baby formula or breast milk in the night  If you are giving your baby breast milk, you may still need to wake up to pump your breasts  Store the milk for your baby to drink at a later time  Babies 6 to 13 months old  should drink 3 to 5 bottles every day  He or she may drink up to 8 ounces at each feeding  You may increase the time between feedings if your baby is not hungry  You may also start to feed your baby foods at 6 months  Ask your child's pediatrician for more information about the right foods to feed your baby  How to help your baby latch on correctly for breastfeeding:  Help your baby move his or her head to reach your breast  Hold the nape of his or her neck to help him or her latch onto your breast  Touch his or her top lip with your nipple and wait for him or her to open his or her mouth wide   Your baby's lower lip and chin should touch the areola (dark area around the nipple) first  Help him or her get as much of the areola in his or her mouth as possible  You should feel as if your baby will not separate from your breast easily  A correct latch helps your baby get the right amount of milk at each feeding  Allow your baby to breastfeed for as long as he or she is able  Signs of correct latch-on:   You can hear your baby swallow  Your baby is relaxed and takes slow, deep mouthfuls  Your breast or nipple does not hurt during breastfeeding  Your baby is able to suckle milk right away after he or she latches on  Your nipple is the same shape when your baby is done breastfeeding  Your breast is smooth, with no wrinkles or dimples where your baby is latched on  Feed your baby safely:   Hold your baby upright to feed him or her  Do not prop your baby's bottle  Your baby could choke while you are not watching, especially in a moving vehicle  Do not use a microwave to heat your baby's bottle  The milk or formula will not heat evenly and will have spots that are very hot  Your baby's face or mouth could be burned  You can warm the milk or formula quickly by placing the bottle in a pot of warm water for a few minutes  How to burp your baby:  Mehreen Ka your baby when you switch breasts or after every 2 to 3 ounces from a bottle  Burp him or her again when he or she is finished eating  Your baby may spit up when he or she burps  This is normal  Hold your baby in any of the following positions to help him or her burp:  Hold your baby against your chest or shoulder  Support his or her bottom with one hand  Use your other hand to pat or rub his or her back gently  Sit your baby upright on your lap  Use one hand to support his or her chest and head  Use the other hand to pat or rub his or her back  Place your baby across your lap  He or she should face down with his or her head, chest, and belly resting on your lap   Hold him or her securely with one hand and use your other hand to rub or pat his or her back  How to change your baby's diaper:  Never leave your baby alone when you change his or her diaper  If you need to leave the room, put the diaper back on and take your baby with you  Wash your hands before and after you change your baby's diaper  Put a blanket or changing pad on a safe surface  Estefaniazoey Roberts your baby down on the blanket or pad  Remove the dirty diaper and clean your baby's bottom  If your baby had a bowel movement, use the diaper to wipe off most of the bowel movement  Clean your baby's bottom with a wet washcloth or diaper wipe  Do not use diaper wipes if your baby has a rash or circumcision that has not yet healed  Gently lift both legs and wash the buttocks  Always wipe from front to back  Clean under all skin folds and between creases  Apply ointment or petroleum jelly as directed if your baby has a rash  Put on a clean diaper  Lift both your baby's legs and slide the clean diaper beneath his or her buttocks  Gently direct your baby boy's penis down as the diaper is put on  Fold the diaper down if your baby's umbilical cord has not fallen off  How to care for your baby's skin:  Sponge bathe your baby with warm water and a cleanser made for a baby's skin  Do not use baby oil, creams, or ointments  These may irritate your baby's skin or make skin problems worse  Ask for more information on sponge bathing your baby  Fontanelles  (soft spots) on your baby's head are usually flat  They may bulge when your baby cries or strains  It is normal to see and feel a pulse beating under a soft spot  It is okay to touch and wash your baby's soft spots  Skin peeling  is common in babies who are born after their due date  Peeling does not mean that your baby's skin is too dry  You do not need to put lotions or oils on your 's skin to stop the peeling or to treat rashes  Bumps, a rash, or acne  may appear about 3 days to 5 weeks after birth  Bumps may be white or yellow  Your baby's cheeks may feel rough and may be covered with a red, oily rash  Do not squeeze or scrub the skin  When your baby is 1 to 2 months old, his or her skin pores will begin to naturally open  When this happens, the skin problems will go away  A lip callus (thickened skin)  may form on your baby's upper lip during the first month  It is caused by sucking and should go away within the first year  This callus does not bother your baby, so you do not need to remove it  How to clean your baby's ears and nose:   Use a wet washcloth or cotton ball  to clean the outer part of your baby's ears  Do not put cotton swabs into your baby's ears  These can hurt his or her ears and push earwax in  Earwax should come out of your baby's ear on its own  Talk to your baby's pediatrician if you think your baby has too much earwax  Use a rubber bulb syringe  to suction your baby's nose if he or she is stuffed up  Point the bulb syringe away from his or her face and squeeze the bulb to create a vacuum  Gently put the tip into one of your baby's nostrils  Close the other nostril with your fingers  Release the bulb so that it sucks out the mucus  Repeat if necessary  Boil the syringe for 10 minutes after each use  Do not put your fingers or cotton swabs into your baby's nose  How to care for your baby's eyes:  A  baby's eyes usually make just enough tears to keep his or her eyes wet  By 7 to 7 months old, your baby's eyes will develop so they can make more tears  Tears drain into small ducts at the inside corners of each eye  A blocked tear duct is common in newborns  A possible sign of a blocked tear duct is a yellow sticky discharge in one or both of your baby's eyes  Your baby's pediatrician may show you how to massage your baby's tear ducts to unplug them  How to care for your baby's fingernails and toenails:  Your baby's fingernails are soft, and they grow quickly   You may need to trim them with baby nail clippers 1 or 2 times each week  Be careful not to cut too closely to the skin because you may cut the skin and cause bleeding  It may be easier to cut your baby's fingernails when he or she is asleep  Your baby's toenails may grow much slower  They may be soft and deeply set into each toe  You will not need to trim them as often  How to care for your baby's umbilical cord stump:  Your baby's umbilical cord stump will dry and fall off in about 7 to 21 days, leaving a belly button  If your baby's stump gets dirty from urine or bowel movement, wash it off right away with water  Gently pat the stump dry  This will help prevent infection around your baby's cord stump  Fold the front of the diaper down below the cord stump to let it air dry  Do not cover or pull at the cord stump  How to care for your baby boy's circumcision:  Your baby's penis may have a plastic ring that will come off within 8 days  His penis may be covered with gauze and petroleum jelly  Keep your baby's penis as clean as possible  Clean it with warm water only  Gently blot or squeeze the water from a wet cloth or cotton ball onto the penis  Do not use soap or diaper wipes to clean the circumcision area  This could sting or irritate your baby's penis  Your baby's penis should heal in about 7 to 10 days  What to do when your baby cries:  Your baby may cry because he or she is hungry  He or she may have a wet diaper, or be hot or cold  He or she may cry for no reason you can find  It can be hard to listen to your baby cry and not be able to calm him or her down  Ask for help and take a break if you feel stressed or overwhelmed  Never shake your baby to try to stop his or her crying  This can cause blindness or brain damage  The following may help comfort your baby:  Hold your baby skin to skin and rock him or her, or swaddle him or her in a soft blanket           Gently pat your baby's back or chest  Stroke or rub his or her head  Quietly sing or talk to your baby, or play soft, soothing music  Put your baby in his or her car seat and take him or her for a drive, or go for a stroller ride  Burp your baby to get rid of extra gas  Give your baby a soothing, warm bath  How to keep your baby safe when he or she sleeps:   Always lay your baby on his or her back to sleep  This position can help reduce your baby's risk for sudden infant death syndrome (SIDS)  Keep the room at a temperature that is comfortable for an adult  Do not let the room get too hot or cold  Use a crib or bassinet that has firm sides  Do not let your baby sleep on a soft surface such as a waterbed or couch  He or she could suffocate if his or her face gets caught in a soft surface  Use a firm, flat mattress  Cover the mattress with a fitted sheet that is made especially for the type of mattress you are using  Remove all objects, such as toys, pillows, or blankets, from your baby's bed while he or she sleeps  Ask for more information on childproofing  How to keep your baby safe in the car: Always buckle your baby into a child safety seat  A child safety seat is a padded seat that secures infants and children while they ride in a car  Every child safety seat has age, height, and weight ranges  Keep using the safety seat until your child reaches the maximum of the range  Then he or she is ready for the child safety seat that is the next size up  Only use child safety seats  Do not use a toy chair or prop your child on books or other objects  Make sure you have a safety seat that meets safety standards  Place your child safety seat in the middle of the back seat  The safety seat should not move more than 1 inch in any direction after you secure it  Always follow the instructions provided to help you position the safety seat  The instructions will also guide you on how to secure your child properly      Make sure the child safety seat has a harness and clip  The harness is made of straps that go over your child's shoulders  The straps connect to a buckle that rests over your child's abdomen  These straps keep your child in the seat during an accident  Another strap comes up from the bottom of the seat and connects to the buckle between your child's legs  This strap keeps your child from slipping out of the seat  Slide the clip up and down the shoulder straps to make them tighter or looser  You should be able to slip a finger between your child and the strap  Follow up with your baby's pediatrician as directed:  Write down your questions so you remember to ask them during your visits  © Copyright [employer] 2022 Information is for End User's use only and may not be sold, redistributed or otherwise used for commercial purposes  All illustrations and images included in CareNotes® are the copyrighted property of A D A M , Inc  or [facility]   The above information is an  only  It is not intended as medical advice for individual conditions or treatments  Talk to your doctor, nurse or pharmacist before following any medical regimen to see if it is safe and effective for you

## 2022-05-31 NOTE — PROGRESS NOTES
Assessment/Plan:    No problem-specific Assessment & Plan notes found for this encounter  Diagnoses and all orders for this visit:    Status post           Subjective:      Patient ID: Ernestine Bass is a 32 y o  female  80-year-old female presents for 1 week postop  incision check  Pt delivered on 22, transferred to [facility], Patient was unable to get here at 1 week due to baby being hospitalized at [address], baby was born with a cardiac anomaly and needed surgery  All were discharge to home this past   All are doing well baby is being fed with 0G tube,   Patient reports feeling a slight tugging sensation at her incision line , denies pelvic pain unusual vaginal bleeding or discharge or any other signs of infection  Patient will schedule for normal postpartum visit      The following portions of the patient's history were reviewed and updated as appropriate: allergies, current medications, past family history, past medical history, past social history, past surgical history and problem list     Review of Systems   Constitutional: Negative for chills, fatigue and fever  Eyes: Negative for visual disturbance  Respiratory: Negative for cough and shortness of breath  Cardiovascular: Negative for chest pain  Gastrointestinal: Negative for abdominal pain  Genitourinary: Negative for vaginal bleeding and vaginal discharge  Objective:      /70 (BP Location: Right arm, Patient Position: Sitting, Cuff Size: Large)   Ht 5' 5" (1 651 m)   Wt 88 kg (194 lb)   LMP 2021 (LMP Unknown)   BMI 32 28 kg/m²          Physical Exam  Vitals and nursing note reviewed  Constitutional:       Appearance: Normal appearance  She is normal weight  HENT:      Head: Normocephalic and atraumatic  Eyes:      Conjunctiva/sclera: Conjunctivae normal    Cardiovascular:      Rate and Rhythm: Normal rate     Pulmonary:      Effort: Pulmonary effort is normal    Abdominal: General: A surgical scar is present  Comments: Incision clean dry and intact no sign of infection   Musculoskeletal:         General: Normal range of motion  Cervical back: Normal range of motion  Skin:     General: Skin is warm and dry  Neurological:      Mental Status: She is alert  Psychiatric:         Mood and Affect: Mood normal          Behavior: Behavior normal          Thought Content:  Thought content normal          Judgment: Judgment normal        schedule normal postpartum exam

## 2022-06-05 PROBLEM — Z34.93 PRENATAL CARE IN THIRD TRIMESTER: Status: RESOLVED | Noted: 2022-01-18 | Resolved: 2022-06-05

## 2022-06-06 NOTE — PROGRESS NOTES
Diagnoses and all orders for this visit:    Postpartum exam    HPV vaccine counseling          Gabriel Segura is a 32 y o  Vertie Henle presents for routine postpartum visit  She is s/p PCS for [place]  @ 38 4 on 22 , Baby Girl, Apgar's 5/9  Mom and baby doing well  Prenatal and intrapartum course was complicated by   Patient Active Problem List   Diagnosis    History of syncope    COVID-19 affecting pregnancy in second trimester    Back pain affecting pregnancy in second trimester    Fetal cardiac anomaly affecting pregnancy, antepartum    Supervision of high-risk pregnancy, third trimester    Fetal arrhythmia affecting pregnancy, antepartum    Maternal obesity, antepartum, third trimester    Personal history of COVID-19    Oligohydramnios antepartum, third trimester, not applicable or unspecified fetus    Malpresentation before onset of labor    Status post primary low transverse  section     Since d/c home she has had no complaints  She denies abn bleeding, pelvic pain, breast complaints, bladder dysfunction, depression/anx  Reports constipation  Taking Colace,  advised to increase to twice a day, advised to increase fluid intake, fiber, roughage  Baby is thriving  Bottle feeding  EPDS score 0 Good support  Denies resumption of sexual activity    Normal postpartum exam  Lochia Alba noted/scant  No CMT or S&S of infection The patient may advance activity as tolerated and may resume sexual activity after 6 week PP  She expresses no interest in starting for contraception  Will use condoms for now  Would possibly consider the pill, she was on in the past   Reviewed risks/benefits, SE's/AE's and directions for use  Advised of importance of compliance, discussed reasons to use back up and reviewed reasons to call  Discussed risks of short interval pregnancies  Reviewed Kegel's, rest, daily exercise and nutrition recommendations  Continue with PNV      Gardasil: Not completed,  Gardasil discussed pt aware of benefits/ risks  Aware she can start the series today or call for appt anytime   Declines HPV vaccines, we will discuss again at yearly    Past Medical History:   Diagnosis Date    Mononucleosis     Pleurisy     Varicella     disease     Past Surgical History:   Procedure Laterality Date    RI  DELIVERY ONLY N/A 2022    Procedure:  SECTION ();   Surgeon: Geovanna Hauser MD;  Location: [facility] ;  Service: Obstetrics    WISDOM TOOTH EXTRACTION         Immunization History   Administered Date(s) Administered    Influenza, injectable, quadrivalent, preservative free 0 5 mL 2021    Tdap 03/15/2022, 03/15/2022       Family History   Problem Relation Age of Onset    No Known Problems Mother     Diabetes Father     Hypertension Father     No Known Problems Maternal Grandmother     No Known Problems Maternal Grandfather     No Known Problems Paternal Grandmother     Bone cancer Paternal Grandfather     No Known Problems Brother      Social History     Tobacco Use    Smoking status: Former Smoker     Packs/day: 0 25     Types: Cigarettes     Quit date: 9/15/2021     Years since quittin 7    Smokeless tobacco: Never Used    Tobacco comment: quit mid sept   Vaping Use    Vaping Use: Never used   Substance Use Topics    Alcohol use: Not Currently    Drug use: No         Current Outpatient Medications:     docusate sodium (COLACE) 100 mg capsule, Take 1 capsule (100 mg total) by mouth in the morning and 1 capsule (100 mg total) in the evening , Disp: 30 capsule, Rfl: 0    ibuprofen (MOTRIN) 600 mg tablet, Take 1 tablet (600 mg total) by mouth every 6 (six) hours, Disp: 30 tablet, Rfl: 0    Prenatal Vit-Fe Fumarate-FA (PRENATAL VITAMINS PO), Take by mouth, Disp: , Rfl:     Patient Active Problem List    Diagnosis Date Noted    Status post primary low transverse  section 2022    Malpresentation before onset of labor 2022    Oligohydramnios antepartum, third trimester, not applicable or unspecified fetus 2022    Fetal arrhythmia affecting pregnancy, antepartum 2022    Maternal obesity, antepartum, third trimester 2022    Supervision of high-risk pregnancy, third trimester 03/15/2022    Fetal cardiac anomaly affecting pregnancy, antepartum 2022    Back pain affecting pregnancy in second trimester 2022    COVID-19 affecting pregnancy in second trimester 2022    Personal history of COVID-19 2022    History of syncope 2021     No Known Allergies    OB History    Para Term  AB Living   1 1 1 0 0 1   SAB IAB Ectopic Multiple Live Births   0 0 0 0 1      # Outcome Date GA Lbr Stephen/2nd Weight Sex Delivery Anes PTL Lv   1 Term 22 38w4d  3425 g (7 lb 8 8 oz) F CS-LTranv Spinal  PONCE       Vitals:    22 1358   BP: 118/76   BP Location: Left arm   Patient Position: Sitting   Cuff Size: Large   Weight: 86 6 kg (191 lb)   Height: 5' 5" (1 651 m)     Body mass index is 31 78 kg/m²  Review of Systems     Constitutional: Negative for chills, fatigue, fever, headaches, visual disturbances, and unexpected weight change  Respiratory: Negative for cough, & shortness of breath  Cardiovascular: Negative for chest pain       Gastrointestinal: Negative for Abd pain, nausea & vomiting, constipation and diarrhea  Genitourinary: Negative for difficulty urinating, dysuria, hematuria, dyspareunia, unusual vaginal bleeding or discharge  Skin: Negative skin changes    Physical Exam     Constitutional: Alert & Oriented x3, well-developed and well-nourished  No distress  HENT: Atraumatic, Normocephalic, Conjunctivae clear  Neck: Normal range of motion  Pulmonary: Effort normal    Cardiac: RRR,    Abdominal: Soft   No tenderness or masses  Musculoskeletal: Normal ROM  Skin: Warm & Dry  Psychological: Normal mood, thought content, behavior & judgement Breasts:   Right: tissue soft without masses, tenderness, skin changes or nipple discharge  No areas of erythema or pain  No subclavicular, axillary, pectoral adenopathy  Left:  tissue soft without masses, tenderness, skin changes or nipple discharge  No areas of erythema or pain  No subclavicular, axillary, pectoral adenopathy    Declines pelvic exam    May advance activities as tolerated  Perineal hygiene reviewed   Weight bearing exercises minium of 150 mins/weekly advised  Kegel exercises recommended  Reviewed  rest, daily exercise and nutrition recommendations  Continue with PNV  Gardisil vaccines recommended up to age 39  Advised to call with any issues,  all concerns & questions addressed       Follow up for annual exam in 3 months   See AVS for further educational information

## 2022-06-06 NOTE — PATIENT INSTRUCTIONS
Section   WHAT YOU SHOULD KNOW:   A  delivery, or , is abdominal surgery to deliver your baby  There are many reasons you may need a   A  may be scheduled before labor if you had a  with your last baby  It may be scheduled if your baby is not positioned normally, or you are pregnant with more than 1 baby  Your caregiver may perform an emergency  during labor to prevent life-threatening complications for you or your baby  A  may be done if your cervix does not dilate after several hours of active labor  Other reasons for a  include maternal infections and problems with the placenta  AFTER YOU LEAVE:   Medicines:   Prescription pain medicine  may be given  Ask how to take this medicine safely  Acetaminophen  decreases pain and fever  It is available without a doctor's order  Ask how much to take and how often to take it  Follow directions  Acetaminophen can cause liver damage if not taken correctly  NSAIDs  help decrease swelling and pain or fever  This medicine is available with or without a doctor's order  NSAIDs can cause stomach bleeding or kidney problems in certain people  If you take blood thinner medicine, always ask your obstetrician if NSAIDs are safe for you  Always read the medicine label and follow directions  Take your medicine as directed  Contact your obstetrician (OB) if you think your medicine is not helping or if you have side effects  Tell him if you are allergic to any medicine  Keep a list of the medicines, vitamins, and herbs you take  Include the amounts, and when and why you take them  Bring the list or the pill bottles to follow-up visits  Carry your medicine list with you in case of an emergency  Follow up with your OB as directed: You may need to return to have your stitches or staples removed  Write down your questions so you remember to ask them during your visits    Wound care: Carefully wash your wound with soap and water every day  Keep your wound clean and dry  Wear loose, comfortable clothes that do not rub against your wound  Ask your OB about bathing and showering  Drink plenty of liquids: You can lower your risk for a blood clot if you drink plenty of liquids  Ask how much liquid to drink each day and which liquids are best for you  Limit activity until you have fully recovered from surgery:   Ask when it is safe for you to drive, walk up stairs, lift heavy objects, and have sex  Ask when it is okay to exercise, and what types of exercise to do  Start slowly and do more as you get stronger  Contact your OB if:   You have heavy vaginal bleeding that fills 1 or more sanitary pads in 1 hour  You have a fever  Your incision is swollen, red, or draining pus  You have questions or concerns about yourself or your baby  Seek care immediately or call 911 if:   Blood soaks through your bandage  Your stitches come apart  You feel lightheaded, short of breath, and have chest pain  You cough up blood  Your arm or leg feels warm, tender, and painful  It may look swollen and red  [address] is for End User's use only and may not be sold, redistributed or otherwise used for commercial purposes  All illustrations and images included in CareNotes® are the copyrighted property of A D A M , Inc  or Nicholas Owens  The above information is an  only  It is not intended as medical advice for individual conditions or treatments  Talk to your doctor, nurse or pharmacist before following any medical regimen to see if it is safe and effective for you  Postpartum Bleeding   AMBULATORY CARE:   What you need to know about postpartum bleeding:  Postpartum bleeding is vaginal bleeding after childbirth  This bleeding is normal, whether your baby was born vaginally or by    It contains blood and the tissue that lined the inside of your uterus when you were pregnant  What to expect with postpartum bleeding:  Postpartum bleeding usually lasts at least 10 days, and may last longer than 6 weeks  Your bleeding may range from light (barely staining a pad) to heavy (soaking a pad in 1 hour)  Usually, you have heavier bleeding right after childbirth, which slows over the next few weeks until it stops  The bleeding is red or dark brown with clots for the first 1 to 3 days  It then turns pink for several days, and then becomes a white or yellow discharge until it ends  Call your local emergency number (911 in the [address]) if:   You are suddenly short of breath and feel lightheaded  You have sudden chest pain  You are breathing faster than normal     Your heart is beating faster than normal     Call your doctor or obstetrician if:   Your bleeding increases, or you have heavy bleeding that soaks a pad in 1 hour for 2 hours in a row  You have a fever  You pass large blood clots  You feel dizzy  You have a low back ache, abdominal pain or tenderness, or loss of appetite  You are urinating less than usual, or not at all  You have questions or concerns about your condition or care  What to expect with postpartum bleeding:  Postpartum bleeding usually lasts at least 10 days, and may last longer than 6 weeks  Your bleeding may range from light (barely staining a pad) to heavy (soaking a pad in 1 hour)  Usually, you have heavier bleeding right after childbirth, which slows over the next few weeks until it stops  The bleeding is red or dark brown with clots for the first 1 to 3 days  It then turns pink for several days, and then becomes a white or yellow discharge until it ends  Follow up with your obstetrician as directed:  Do not have sex until your obstetrician says it is okay  Write down your questions so you remember to ask them during your visits    © Copyright [employer] 2022 Information is for End User's use only and may not be sold, redistributed or otherwise used for commercial purposes  All illustrations and images included in CareNotes® are the copyrighted property of A D A M , Inc  or Alma Diego  The above information is an  only  It is not intended as medical advice for individual conditions or treatments  Talk to your doctor, nurse or pharmacist before following any medical regimen to see if it is safe and effective for you  Postpartum Depression   AMBULATORY CARE:   Postpartum depression  is a mood disorder that occurs after your baby is born  Your symptoms may last up to 12 months after delivery  Your symptoms may become serious and affect your daily activities and relationships  The exact cause is not known  Hormone levels that increased during pregnancy suddenly drop after your baby is born  This can cause your symptoms  A past episode of postpartum depression or a family history of depression may increase your risk  A  may also be done if you are pregnant with more than 1 baby  Postpartum depression may also be trigged by a lack of support at home, stress, or medical problems  Common signs and symptoms include the following:   Feeling sad, anxious, tearful, discouraged, hopeless, or alone    Thoughts that you are not a good mother    Trouble completing daily tasks, concentrating, or remembering things    Lack of appetite    Lack interest in your baby    Feeling restless, irritable, or withdrawn    An overwhelmed feeling with your new baby and a belief that it will not get better    Feeling unimportant or guilty most of the time    Trouble sleeping, even after the baby is asleep    Call your local emergency number (911 in the [address]) if:   You think about hurting yourself or your baby  Seek care immediately if:   Your feelings of depression or sadness are strong  Call your doctor if:   Your symptoms last most of the day for days in a row      Your symptoms last more than 1 week  You have questions or concerns about your condition or care  How postpartum depression is diagnosed:  Healthcare providers will talk to you about how you are feeling and ask if you have any depression  These talks can happen during appointments for your medical care and for your baby's care, such as well child visits  Talk to your providers about the following:  When you started to feel depressed, and if it is getting worse over time    Problems you are having with daily activities, sleep, or caring for your baby    If anything makes your depression worse, or makes you feel better    Feeling that you are not bonding with your baby the way you want    Any problems your baby has with sleeping or feeding    If your baby is fussy or cries a lot    Support you have from friends, family, or others    Treatment  may include any of the following:  A therapist  can help you find ways to cope with your feelings  This can be done alone or in a group  Antidepressants  help decrease or stop your symptoms  Self-care: You may feel better quickly, or if may take a few weeks to feel better  Be patient with yourself  Do the following to take care of yourself:  Rest as needed  Take a nap or rest while your baby sleeps  Ask someone to watch your baby while you nap  Get emotional support  Share your feelings with your partner, a friend, or another mother  Ask your partner, friends, or family to help with cooking or cleaning  Do only what is needed and let other things wait until later  Exercise when you can  Even 5 or 10 minutes of exercise can help improve your mood  Walk around the block or do some stretching exercises  Eat a variety of healthy foods  Healthy foods include fruits, vegetables, whole-grain breads, low-fat dairy products, beans, lean meats, and fish  Do not skip meals  Take care of yourself  Shower and dress each day   Write in a journal  Celebrate small achievements, even if it is only 1 thing a day  Try to get out of the house a little each day  Meet a friend for coffee  Follow up with your doctor as directed: Your doctor may refer you to a therapist or other specialist  Write down your questions so you remember to ask them during your visits  © [employer] 2022 Information is for End User's use only and may not be sold, redistributed or otherwise used for commercial purposes  All illustrations and images included in CareNotes® are the copyrighted property of A D A M , Inc  or Alma Win   The above information is an  only  It is not intended as medical advice for individual conditions or treatments  Talk to your doctor, nurse or pharmacist before following any medical regimen to see if it is safe and effective for you  Breastfeeding and Breast Engorgement   AMBULATORY CARE:   What you need to know about breast engorgement:  Breast engorgement develops when too much milk builds up in your breast  It is normal for your breasts to feel swollen, heavy, and tender when your milk comes in  This is called breast fullness  When your breast starts to feel painful and hard, the fullness has developed into engorgement  Breast engorgement usually happens 3 to 5 days after you give birth  Engorgement can happen if you are not breastfeeding or expressing milk often, or produce a lot of milk  Your baby may have a hard time latching on (attaching) to your breast to feed  Without treatment, engorgement can lead to plugged milk ducts or a breast infection called mastitis  Common symptoms include the following: A swollen, tender breast    A breast that feels hard to the touch or looks tight or shiny    Warm, red, or throbbing breast    Flat nipple    A low fever    Seek care immediately if:   You have a fever with chills or body aches  You have pain and swelling in one or both breasts that keeps you from breastfeeding      Contact your healthcare provider if:   You have a tender breast lump that grows slowly and usually forms on one side of your breast     You have a small, white bump on your nipple  Your symptoms do not get better within 24 hours  You have questions or concerns about your condition or care  Manage your symptoms:   Breastfeed or pump every 2 or 3 hours  Frequent breastfeeding helps decrease engorgement discomfort  Express or pump milk from your breasts before you breastfeed  This will help soften your breast and your nipple, and allow your baby to latch on better  Massage your breast   Breast massage helps empty your engorged breast and decrease pain  Gently massage your breast before and during breastfeeding to help increase your milk flow  Gently stroke your breast, starting from the outer areas and working your way toward the nipple  Breast massage may also help prevent breast engorgement if done in the first few days after you give birth  Apply a cool compress in between feedings  The cold may help decrease swelling and pain in your engorged breast  Wet a washcloth in cold water, wring it out, and place it on your breast  Ask how long and how often to use a cool compress  Wear a supportive bra  The bra should fit well but not be too tight  Prevent breast engorgement:   Help your baby get a good latch  Hold the nape of his or her neck to help him or her latch onto your breast  Touch his or her top lip with your nipple and wait for him or her to open his or her mouth wide  Your baby's lower lip and chin should touch the areola (dark area around the nipple) first  Help him or her get as much of the areola in his or her mouth as possible  You should feel as if your baby will not separate from your breast easily  Gently break suction and reposition if your baby is only sucking on the nipple  Talk to a lactation consultant if you need help with your baby's latch  Empty your breasts completely    Take your time when you breastfeed to allow your baby to empty your breast  Try not to switch breasts too early  Express or pump after you breastfeed if your baby is not emptying your breasts when he or she feeds  Apply warmth to your breast before you breastfeed  Put a warm, wet cloth on your breast or take a warm shower  This can help increase your milk flow  Follow up with your doctor as directed:  Write down your questions so you remember to ask them during your visits  For more information:   [employer] [address]  [place] , [address]  Phone: [phone]  Web Address: [email or web address]/    [facility]  [address]   [facility] Stephanie Vasquez  Phone: [phone]  Phone: [phone]  Web Address: http://[facility]/  [address] \[facility] [place] [facility]\"" 2022 Information is for End User's use only and may not be sold, redistributed or otherwise used for commercial purposes  All illustrations and images included in CareNotes® are the copyrighted property of [employer]  or [facility]   The above information is an  only  It is not intended as medical advice for individual conditions or treatments  Talk to your doctor, nurse or pharmacist before following any medical regimen to see if it is safe and effective for you  Mastitis   AMBULATORY CARE:   Mastitis  is an infection of breast tissue that most often occurs in women who breastfeed  It can happen any time during breastfeeding, but usually occurs within the first 3 months after giving birth  Usually only one breast is affected  Common signs and symptoms include the following:   Chills and fever    Breast swelling, redness, warmth, and tenderness     Tenderness under your arm    Fatigue and body aches    Contact your healthcare provider if:   Your symptoms do not get better within 2 days      You have a painful lump in your breast      You have swollen and tender lymph nodes in your armpit on the same side as the affected breast     You have questions or concerns about your condition or care  Treatment:  You can continue to breastfeed your baby while you are being treated for mastitis  Breastfeeding when you have mastitis may help speed your recovery  You may need any of the following:  Antibiotics  help treat or prevent a bacterial infection  Acetaminophen  decreases pain and fever  It is available without a doctor's order  Ask how much to take and how often to take it  Follow directions  Acetaminophen can cause liver damage if not taken correctly  NSAIDs , such as ibuprofen, help decrease swelling, pain, and fever  This medicine is available with or without a doctor's order  NSAIDs can cause stomach bleeding or kidney problems in certain people  If you take blood thinner medicine, always ask your healthcare provider if NSAIDs are safe for you  Always read the medicine label and follow directions  Incision and drainage  may be needed if the swelling does not go away and an abscess forms  Your healthcare provider will make a small incision in your breast to drain the pus  Manage your symptoms:   Continue to breastfeed from the affected breast   This will help to prevent an abscess from forming  Breastfeed your baby on the affected side first  Apply a warm, wet cloth on your breast or take a warm shower before you feed your baby  This can help increase your milk flow  If it is painful when you breastfeed from the affected breast, feed your baby from the other breast first  Pump the affected side to completely drain your breast after breastfeeding, if needed  You may save the pumped milk to feed your baby  Use different positions to breastfeed  Change the position of your baby during feedings  This may help to relieve your discomfort  Apply heat on your breast for 20 to 30 minutes every 2 hours for as many days as directed  Heat helps decrease pain  Apply ice after feedings  Apply ice on your breast for 15 to 20 minutes every hour or as directed  Use an ice pack, or put crushed ice in a plastic bag  Cover it with a towel  Ice helps prevent tissue damage and decreases swelling and pain  Massage your breast   Gently massage your breast before and during breastfeeding to help drain your milk  Drink liquids as directed  Ask how much liquid to drink each day and which liquids are best for you  Rest as needed  Do not sleep on your stomach until your infection is gone  Prevent mastitis:   Breastfeed every 2 or 3 hours to prevent engorgement  Breast engorgement develops when too much milk builds up in your breast  Take your time when you breastfeed to allow your baby to empty your breast  Try not to switch breasts too early  Express or pump after you breastfeed if your baby is not emptying your breasts when he or she feeds  Prevent sore and cracked nipples  A good latch prevents sore and cracked nipples  If you have sore nipples after breastfeeding, your baby may not be latched on properly  Gently break suction and reposition if your baby is only sucking on the nipple  Talk to a lactation consultant if you need help with your baby's latch  Care for your breasts  Keep your nipples clean and dry between feedings  Check them for cracks, blisters, or other irritated areas  Ask a lactation specialist or your healthcare provider how to treat sore and cracked nipples  Wash your hands before and after you breastfeed your baby or pump your breasts  Wear a comfortable nursing bra that supports your breasts but is not too tight  Follow up with your doctor as directed:  Write down your questions so you remember to ask them during your visits  © [employer] 2022 Information is for End User's use only and may not be sold, redistributed or otherwise used for commercial purposes   All illustrations and images included in CareNotes® are the copyrighted property of A D A M , Inc  or [address]   The above information is an  only  It is not intended as medical advice for individual conditions or treatments  Talk to your doctor, nurse or pharmacist before following any medical regimen to see if it is safe and effective for you  Self Care After Delivery   AMBULATORY CARE:   The postpartum period  is the period of time from delivery to about 6 weeks  During this time you may experience many physical and emotional changes  It is important to understand what is normal and when you need to call your healthcare provider  It is also important to know how to care for yourself during this time  Call your local emergency number (911 in the [address] [facility]) for any of the following: You see or hear things that are not there, or have thoughts of harming yourself or your baby  You soak through 1 pad in 15 minutes, have blurry vision, clammy or pale skin, and feel faint  You faint or lose consciousness  You have trouble breathing  You cough up blood  Your  incision comes apart  Seek care immediately if:   Your heart is beating faster than usual     You have a bad headache or changes in your vision  Your episiotomy or  incision is red, swollen, bleeding, or draining pus  You have severe abdominal pain  Call your doctor or obstetrician if:   Your leg is painful, red, and larger than usual     You soak through 1 or more pads in an hour, or pass blood clots larger than a quarter from your vagina  You have a fever  You have new or worsening pain in your abdomen or vagina  You continue to have depression 1 to 2 weeks after you deliver  You have trouble sleeping  You have foul-smelling discharge from your vagina  You have pain or burning when you urinate  You do not have a bowel movement for 3 days or more  You have nausea or are vomiting      You have hard lumps or red streaks over your breasts  You have cracked nipples or bleed from your nipples  You have questions or concerns about your condition or care  Physical changes: The following are normal changes after you give birth:  Pain in the area between your anus and vagina    Breast pain    Constipation or hemorrhoids    Hot or cold flashes    Vaginal bleeding or discharge    Mild to moderate abdominal cramping    Difficulty controlling bowel movements or urine    Emotional changes:  A drop in hormone levels after you deliver may cause changes in your emotions  You may feel irritable, sad, or anxious  You may cry easily or for no reason  You may also feel depressed  Depression that continues can be a sign of postpartum depression, a condition that can be treated  Treatment may include talk therapy, medicines, or both  Healthcare providers will ask how you are feeling and if you have any depression  These talks can happen during appointments for your medical care and for your baby's care, such as well child visits  Providers can help you find ways to care for yourself and your baby  Talk to your providers about the following:  When emotional changes or depression started, and if it is getting worse over time    Problems you are having with daily activities, sleep, or caring for your baby    If anything makes you feel worse, or makes you feel better    Feeling that you are not bonding with your baby the way you want    Any problems your baby has with sleeping or feeding    Your baby is fussy or cries a lot    Support you have from friends, family, or others    Breast care for breastfeeding mothers: You may have sore breasts for 3 to 6 days after you give birth  This happens as your milk begins to fill your breasts  You may also have sore breasts if you do not breastfeed frequently  Do the following to care for your breasts:  Apply a moist, warm, compress to your breast as directed  This may help soothe your breasts   Make sure the washcloth is not too hot before you apply it to your breast     Nurse your baby or pump your milk frequently  This may prevent clogged milk ducts  Ask your healthcare provider how often to nurse or pump  Massage your breasts as directed  This may help increase your milk flow  Gently rub your breasts in a circular motion before you breastfeed  You may need to gently squeeze your breast or nipple to help release milk  You can also use a breast pump to help release milk from your breast     Wash your breasts with warm water only  Do not put soap on your nipples  Soap may cause your nipples to become dry  Apply lanolin cream to your nipples as directed  Lanolin cream may add moisture to your skin and prevent nipple dryness  Always  wash off lanolin cream with warm water before you breastfeed  Place pads in your bra  Your nipples may leak milk when you are not breastfeeding  You can place pads inside of your bra to help prevent leaking onto your clothing  Ask your healthcare provider where to purchase bra pads  Get breastfeeding support if needed  Healthcare providers can answer questions about breastfeeding and provide you with support  Ask your healthcare provider who you can contact if you need breastfeeding support  Breast care for non-breastfeeding mothers:  Milk will fill your breasts even if you bottle feed your baby  Do the following to help stop your milk from filling your breasts and causing pain:  Wear a bra with support at all times  A sports bra or a tight-fitting bra will help stop your milk from coming in  Apply ice on each breast for 15 to 20 minutes every hour or as directed  Use an ice pack, or put crushed ice in a plastic bag  Cover it with a towel before you apply it to your breast  Ice helps your milk ducts shrink  Keep your breasts away from warm water  Warm water will make it easier for milk to fill your breasts   Stand with your breasts away from warm water in the shower  Limit how much you touch your breasts  This will prevent them from filling with milk  Perineum care: Your perineum is the area between your rectum and vagina  It is normal to have swelling and pain in this area after you give birth  If you had an episiotomy, your healthcare provider may give you special instructions  Clean your perineum after you use the bathroom  This may prevent infection and help with healing  Use a spray bottle with warm water to clean your perineum  You may also gently spray warm water against your perineum when you urinate  Always wipe front to back  Take a sitz bath as directed  A sitz bath may help relieve swelling and pain  Fill your bath tub or bucket with water up to your hips and sit in the water  Use cold water for 2 days after you deliver  Then use warm water  Ask your healthcare provider for more information about a sitz bath  Apply ice packs for the first 24 hours or as directed  Use a plastic glove filled with ice or buy an ice pack  Wrap the ice pack or plastic glove in a small towel or wash cloth  Place the ice pack on your perineum for 20 minutes at a time  Sit on a donut-shaped pillow  This may relieve pressure on your perineum when you sit  Use wipes that contain medicine or take pills as directed  Your healthcare provider may tell you to use witch hazel pads  You can place witch hazel pads in the refrigerator before you apply them to your perineum  Your provider may also tell you to take NSAIDs  Ask him or her how often to take pills or use the wipes  Do not go swimming or take tub baths for 4 to 6 weeks or as directed  This will help prevent an infection in your vagina or uterus  Bowel and bladder care: It may take 3 to 5 days to have a bowel movement after you deliver your baby  You can do the following to prevent or manage constipation, and get control of your bowel or bladder:  Take stool softeners as directed    A stool softener is medicine that will make your bowel movements softer  This may prevent or relieve constipation  A stool softener may also make bowel movements less painful  Drink plenty of liquids  Ask how much liquid to drink each day and which liquids are best for you  Liquids may help prevent constipation  Eat foods high in fiber  Examples include fruits, vegetables, grains, beans, and lentils  Ask your healthcare provider how much fiber you need each day  Fiber may prevent constipation  Do Kegel exercises as directed  Kegel exercises will help strengthen the muscles that control bowel movements and urination  Ask your healthcare provider for more information on Kegel exercises  Apply cold compresses or medicine to hemorrhoids as directed  This may relieve swelling and pain  Your healthcare provider may tell you to apply ice or wipes that contain medicine to your hemorrhoids  He or she may also tell you to use a sitz bath  Ask your provider for more information on how to manage hemorrhoids  Nutrition:  Good nutrition is important in the postpartum period  It will help you return to a healthy weight, increase your energy levels, and prevent constipation  It will also help you get enough nutrients and calories if you are going to breastfeed your baby  Eat a variety of healthy foods  Healthy foods include fruits, vegetables, whole-grain breads, low-fat dairy products, beans, lean meats, and fish  You may need 500 to 700 extra calories each day if you breastfeed your baby  You may also need extra protein  Limit foods with added sugar and high amounts of fat  These foods are high in calories and low in healthy nutrients  Read food labels so you know how much sugar and fat is in the food you want to eat  Drink 8 to 10 glasses of water per day  Water will help you make plenty of milk for your baby  It will also help prevent constipation   Drink a glass of water every time you breastfeed your baby     Take vitamins as directed  Ask your healthcare provider what vitamins you need  Limit caffeine and alcohol if you are breastfeeding  Caffeine and alcohol can get into your breast milk  Caffeine and alcohol can make your baby fussy  They can also interfere with your baby's sleep  Ask your healthcare provider if you can drink alcohol or caffeine  Rest and sleep: You may feel very tired in the postpartum period  Enough sleep will help you heal and give you energy to care for your baby  The following may help you get sleep and rest:  Nap when your baby naps  Your baby may nap several times during the day  Get rest during this time  Limit visitors  Many people may want to see you and your baby  Ask friends or family to visit on different days  This will give you time to rest     Do not plan too much for one day  Put off household chores so that you have time to rest  Gradually do more each day  Ask for help from family, friends, or neighbors  Ask them to help you with laundry, cleaning, or errands  Also ask someone to watch the baby while you take a nap or relax  Ask your partner to help with the care of your baby  Pump some of your breast milk so your partner can feed your baby during the night  Exercise after delivery:  Wait until your healthcare provider says it is okay to exercise  Exercise can help you lose weight, increase your energy levels, and manage your mood  It can also prevent constipation and blood clots  Start with gentle exercises such as walking  Do more as you have more energy  You may need to avoid abdominal exercises for 1 to 2 weeks after you deliver  Talk to your healthcare provider about an exercise plan that is right for you  Sexual activity after delivery:   Do not have sex until your healthcare provider says it is okay  You may need to wait 4 to 6 weeks before you have sex   This may prevent infection and allow time to heal     Your menstrual cycle may begin as soon as 3 weeks after you deliver  Your period may be delayed if you breastfeed your baby  You can become pregnant before you get your first postpartum period  Talk to your healthcare provider about birth control that is right for you  Some types of birth control are not safe during breastfeeding  For support and more information:  Join a support group for new mothers  Ask for help from family and friends with chores, errands, and care of your baby  Office of [place] Gonzalez, [facility]  [address], [id number] [place] [address]  [address], [address] [place] [address]  Phone: [phone]  Web Address: www womenshealth gov    March of [facility] Postpartum [address] [facility] , [address] [place] [address] Road  [address] [facility] , [address] [place] [address]  Web Address: [email or web address]    Follow up with your doctor or obstetrician as directed: You will need to follow up within 2 to 6 weeks of delivery  Write down your questions so you remember to ask them at your visits  © Copyright [employer] 2022 Information is for End User's use only and may not be sold, redistributed or otherwise used for commercial purposes  All illustrations and images included in CareNotes® are the copyrighted property of A D A M , Inc  or [facility]   The above information is an  only  It is not intended as medical advice for individual conditions or treatments  Talk to your doctor, nurse or pharmacist before following any medical regimen to see if it is safe and effective for you  Caring for Your Baby   WHAT YOU NEED TO KNOW:   Care for your baby includes keeping him or her safe, clean, and comfortable  Your baby will cry or make noises to let you know when he or she needs something  You will learn to tell what your baby needs by the way he or she cries   Your baby will move in certain ways when he or she needs something, such as sucking on a fist when hungry  DISCHARGE INSTRUCTIONS:   Call your local emergency number (911 in the [address]) if:   You feel like hurting your baby  Call your baby's pediatrician if:   Your baby's abdomen is hard and swollen, even when he or she is calm and resting  You feel depressed and cannot take care of your baby  Your baby's lips or mouth are blue and he or she is breathing faster than usual     Your baby's armpit temperature is higher than 99°F (37 2°C)  Your baby's eyes are red, swollen, or draining yellow pus  Your baby coughs often during the day, or chokes during each feeding  Your baby does not want to eat  Your baby cries more than usual and you cannot calm him or her down  Your baby's skin turns yellow or he or she has a rash  You have questions or concerns about caring for your baby  What to feed your baby:   Breast milk is the only food your baby needs for the first 6 months of life  If possible, only breastfeed (no formula) him or her for the first 6 months  Breastfeeding is recommended for at least the first year of your baby's life, even when he or she starts eating food  You may pump your breasts and feed breast milk from a bottle  You may feed your baby formula from a bottle if breastfeeding is not possible  Talk to your baby's pediatrician about the best formula for your baby  He or she can help you choose one that contains iron  Do not add cereal to the milk or formula  Your baby may get too many calories during a feeding  You can make more if your baby is still hungry after he or she finishes a bottle  How much to feed your baby: Your baby may want different amounts each day  The amount of formula or breast milk your baby drinks may change with each feeding and each day  The amount your baby drinks depends on his or her weight, how fast he or she is growing, and how hungry he or she is   Your baby may want to drink a lot one day and not want to drink much the next  Do not overfeed your baby  Overfeeding means your baby gets too many calories during a feeding  This may cause him or her to gain weight too fast  Your baby may also continue to overeat later in life  Look for signs that your baby is done feeding  Your baby may look around instead of watching you  He or she may chew on the nipple of the bottle rather than suck on it  He or she may also cry and try to wriggle away from the bottle or out of the high chair  Feed your baby each time he or she is hungry:      Babies up to 2 months old  will drink about 2 to 4 ounces at each feeding  He or she will probably want to drink every 3 to 4 hours  Wake your baby to feed him or her if he or she sleeps longer than 4 to 5 hours  Babies 2 to 10 months old  should drink 4 to 5 bottles each day  He or she will drink 4 to 6 ounces at each feeding  When your baby is 2 to 1 months old, he or she may begin to sleep through the night  When this happens, you may stop waking up to give your baby formula or breast milk in the night  If you are giving your baby breast milk, you may still need to wake up to pump your breasts  Store the milk for your baby to drink at a later time  Babies 6 to 13 months old  should drink 3 to 5 bottles every day  He or she may drink up to 8 ounces at each feeding  You may increase the time between feedings if your baby is not hungry  You may also start to feed your baby foods at 6 months  Ask your child's pediatrician for more information about the right foods to feed your baby  How to help your baby latch on correctly for breastfeeding:  Help your baby move his or her head to reach your breast  Hold the nape of his or her neck to help him or her latch onto your breast  Touch his or her top lip with your nipple and wait for him or her to open his or her mouth wide   Your baby's lower lip and chin should touch the areola (dark area around the nipple) first  Help him or her get as much of the areola in his or her mouth as possible  You should feel as if your baby will not separate from your breast easily  A correct latch helps your baby get the right amount of milk at each feeding  Allow your baby to breastfeed for as long as he or she is able  Signs of correct latch-on:   You can hear your baby swallow  Your baby is relaxed and takes slow, deep mouthfuls  Your breast or nipple does not hurt during breastfeeding  Your baby is able to suckle milk right away after he or she latches on  Your nipple is the same shape when your baby is done breastfeeding  Your breast is smooth, with no wrinkles or dimples where your baby is latched on  Feed your baby safely:   Hold your baby upright to feed him or her  Do not prop your baby's bottle  Your baby could choke while you are not watching, especially in a moving vehicle  Do not use a microwave to heat your baby's bottle  The milk or formula will not heat evenly and will have spots that are very hot  Your baby's face or mouth could be burned  You can warm the milk or formula quickly by placing the bottle in a pot of warm water for a few minutes  How to burp your baby:  Zayda Grow your baby when you switch breasts or after every 2 to 3 ounces from a bottle  Burp him or her again when he or she is finished eating  Your baby may spit up when he or she burps  This is normal  Hold your baby in any of the following positions to help him or her burp:  Hold your baby against your chest or shoulder  Support his or her bottom with one hand  Use your other hand to pat or rub his or her back gently  Sit your baby upright on your lap  Use one hand to support his or her chest and head  Use the other hand to pat or rub his or her back  Place your baby across your lap  He or she should face down with his or her head, chest, and belly resting on your lap   Hold him or her securely with one hand and use your other hand to rub or pat his or her back  How to change your baby's diaper:  Never leave your baby alone when you change his or her diaper  If you need to leave the room, put the diaper back on and take your baby with you  Wash your hands before and after you change your baby's diaper  Put a blanket or changing pad on a safe surface  Kraft Finn your baby down on the blanket or pad  Remove the dirty diaper and clean your baby's bottom  If your baby had a bowel movement, use the diaper to wipe off most of the bowel movement  Clean your baby's bottom with a wet washcloth or diaper wipe  Do not use diaper wipes if your baby has a rash or circumcision that has not yet healed  Gently lift both legs and wash the buttocks  Always wipe from front to back  Clean under all skin folds and between creases  Apply ointment or petroleum jelly as directed if your baby has a rash  Put on a clean diaper  Lift both your baby's legs and slide the clean diaper beneath his or her buttocks  Gently direct your baby boy's penis down as the diaper is put on  Fold the diaper down if your baby's umbilical cord has not fallen off  How to care for your baby's skin:  Sponge bathe your baby with warm water and a cleanser made for a baby's skin  Do not use baby oil, creams, or ointments  These may irritate your baby's skin or make skin problems worse  Ask for more information on sponge bathing your baby  Fontanelles  (soft spots) on your baby's head are usually flat  They may bulge when your baby cries or strains  It is normal to see and feel a pulse beating under a soft spot  It is okay to touch and wash your baby's soft spots  Skin peeling  is common in babies who are born after their due date  Peeling does not mean that your baby's skin is too dry  You do not need to put lotions or oils on your 's skin to stop the peeling or to treat rashes  Bumps, a rash, or acne  may appear about 3 days to 5 weeks after birth  Bumps may be white or yellow  Your baby's cheeks may feel rough and may be covered with a red, oily rash  Do not squeeze or scrub the skin  When your baby is 1 to 2 months old, his or her skin pores will begin to naturally open  When this happens, the skin problems will go away  A lip callus (thickened skin)  may form on your baby's upper lip during the first month  It is caused by sucking and should go away within the first year  This callus does not bother your baby, so you do not need to remove it  How to clean your baby's ears and nose:   Use a wet washcloth or cotton ball  to clean the outer part of your baby's ears  Do not put cotton swabs into your baby's ears  These can hurt his or her ears and push earwax in  Earwax should come out of your baby's ear on its own  Talk to your baby's pediatrician if you think your baby has too much earwax  Use a rubber bulb syringe  to suction your baby's nose if he or she is stuffed up  Point the bulb syringe away from his or her face and squeeze the bulb to create a vacuum  Gently put the tip into one of your baby's nostrils  Close the other nostril with your fingers  Release the bulb so that it sucks out the mucus  Repeat if necessary  Boil the syringe for 10 minutes after each use  Do not put your fingers or cotton swabs into your baby's nose  How to care for your baby's eyes:  A  baby's eyes usually make just enough tears to keep his or her eyes wet  By 7 to 7 months old, your baby's eyes will develop so they can make more tears  Tears drain into small ducts at the inside corners of each eye  A blocked tear duct is common in newborns  A possible sign of a blocked tear duct is a yellow sticky discharge in one or both of your baby's eyes  Your baby's pediatrician may show you how to massage your baby's tear ducts to unplug them  How to care for your baby's fingernails and toenails:  Your baby's fingernails are soft, and they grow quickly   You may need to trim them with baby nail clippers 1 or 2 times each week  Be careful not to cut too closely to the skin because you may cut the skin and cause bleeding  It may be easier to cut your baby's fingernails when he or she is asleep  Your baby's toenails may grow much slower  They may be soft and deeply set into each toe  You will not need to trim them as often  How to care for your baby's umbilical cord stump:  Your baby's umbilical cord stump will dry and fall off in about 7 to 21 days, leaving a belly button  If your baby's stump gets dirty from urine or bowel movement, wash it off right away with water  Gently pat the stump dry  This will help prevent infection around your baby's cord stump  Fold the front of the diaper down below the cord stump to let it air dry  Do not cover or pull at the cord stump  How to care for your baby boy's circumcision:  Your baby's penis may have a plastic ring that will come off within 8 days  His penis may be covered with gauze and petroleum jelly  Keep your baby's penis as clean as possible  Clean it with warm water only  Gently blot or squeeze the water from a wet cloth or cotton ball onto the penis  Do not use soap or diaper wipes to clean the circumcision area  This could sting or irritate your baby's penis  Your baby's penis should heal in about 7 to 10 days  What to do when your baby cries:  Your baby may cry because he or she is hungry  He or she may have a wet diaper, or be hot or cold  He or she may cry for no reason you can find  It can be hard to listen to your baby cry and not be able to calm him or her down  Ask for help and take a break if you feel stressed or overwhelmed  Never shake your baby to try to stop his or her crying  This can cause blindness or brain damage  The following may help comfort your baby:  Hold your baby skin to skin and rock him or her, or swaddle him or her in a soft blanket           Gently pat your baby's back or chest  Stroke or rub his or her head  Quietly sing or talk to your baby, or play soft, soothing music  Put your baby in his or her car seat and take him or her for a drive, or go for a stroller ride  Burp your baby to get rid of extra gas  Give your baby a soothing, warm bath  How to keep your baby safe when he or she sleeps:   Always lay your baby on his or her back to sleep  This position can help reduce your baby's risk for sudden infant death syndrome (SIDS)  Keep the room at a temperature that is comfortable for an adult  Do not let the room get too hot or cold  Use a crib or bassinet that has firm sides  Do not let your baby sleep on a soft surface such as a waterbed or couch  He or she could suffocate if his or her face gets caught in a soft surface  Use a firm, flat mattress  Cover the mattress with a fitted sheet that is made especially for the type of mattress you are using  Remove all objects, such as toys, pillows, or blankets, from your baby's bed while he or she sleeps  Ask for more information on childproofing  How to keep your baby safe in the car: Always buckle your baby into a child safety seat  A child safety seat is a padded seat that secures infants and children while they ride in a car  Every child safety seat has age, height, and weight ranges  Keep using the safety seat until your child reaches the maximum of the range  Then he or she is ready for the child safety seat that is the next size up  Only use child safety seats  Do not use a toy chair or prop your child on books or other objects  Make sure you have a safety seat that meets safety standards  Place your child safety seat in the middle of the back seat  The safety seat should not move more than 1 inch in any direction after you secure it  Always follow the instructions provided to help you position the safety seat  The instructions will also guide you on how to secure your child properly      Make sure the child safety seat has a harness and clip  The harness is made of straps that go over your child's shoulders  The straps connect to a buckle that rests over your child's abdomen  These straps keep your child in the seat during an accident  Another strap comes up from the bottom of the seat and connects to the buckle between your child's legs  This strap keeps your child from slipping out of the seat  Slide the clip up and down the shoulder straps to make them tighter or looser  You should be able to slip a finger between your child and the strap  Follow up with your baby's pediatrician as directed:  Write down your questions so you remember to ask them during your visits  © Copyright [employer] 2022 Information is for End User's use only and may not be sold, redistributed or otherwise used for commercial purposes  All illustrations and images included in CareNotes® are the copyrighted property of A D A M , Inc  or [facility] [employer]   The above information is an  only  It is not intended as medical advice for individual conditions or treatments  Talk to your doctor, nurse or pharmacist before following any medical regimen to see if it is safe and effective for you

## 2022-06-07 ENCOUNTER — POSTPARTUM VISIT (OUTPATIENT)
Dept: OBGYN CLINIC | Facility: CLINIC | Age: 27
End: 2022-06-07

## 2022-06-07 VITALS
SYSTOLIC BLOOD PRESSURE: 118 MMHG | DIASTOLIC BLOOD PRESSURE: 76 MMHG | HEIGHT: 65 IN | WEIGHT: 191 LBS | BODY MASS INDEX: 31.82 KG/M2

## 2022-06-07 DIAGNOSIS — Z71.85 HPV VACCINE COUNSELING: ICD-10-CM

## 2022-06-07 PROCEDURE — 99024 POSTOP FOLLOW-UP VISIT: CPT | Performed by: OBSTETRICS & GYNECOLOGY

## 2022-09-05 PROBLEM — O98.512 COVID-19 AFFECTING PREGNANCY IN SECOND TRIMESTER: Status: RESOLVED | Noted: 2022-01-18 | Resolved: 2022-09-05

## 2022-09-05 PROBLEM — O36.8390 FETAL ARRHYTHMIA AFFECTING PREGNANCY, ANTEPARTUM: Status: RESOLVED | Noted: 2022-04-02 | Resolved: 2022-09-05

## 2022-09-05 PROBLEM — O41.03X0 OLIGOHYDRAMNIOS ANTEPARTUM, THIRD TRIMESTER, NOT APPLICABLE OR UNSPECIFIED FETUS: Status: RESOLVED | Noted: 2022-04-12 | Resolved: 2022-09-05

## 2022-09-05 PROBLEM — O35.8XX0 FETAL CARDIAC ANOMALY AFFECTING PREGNANCY, ANTEPARTUM: Status: RESOLVED | Noted: 2022-02-24 | Resolved: 2022-09-05

## 2022-09-05 PROBLEM — Z98.891 STATUS POST PRIMARY LOW TRANSVERSE CESAREAN SECTION: Status: RESOLVED | Noted: 2022-05-18 | Resolved: 2022-09-05

## 2022-09-05 PROBLEM — O09.93 SUPERVISION OF HIGH-RISK PREGNANCY, THIRD TRIMESTER: Status: RESOLVED | Noted: 2022-03-15 | Resolved: 2022-09-05

## 2022-09-05 PROBLEM — O32.9XX0 MALPRESENTATION BEFORE ONSET OF LABOR: Status: RESOLVED | Noted: 2022-05-03 | Resolved: 2022-09-05

## 2022-09-05 PROBLEM — M54.9 BACK PAIN AFFECTING PREGNANCY IN SECOND TRIMESTER: Status: RESOLVED | Noted: 2022-02-14 | Resolved: 2022-09-05

## 2022-09-05 PROBLEM — O35.BXX0 FETAL CARDIAC ANOMALY AFFECTING PREGNANCY, ANTEPARTUM: Status: RESOLVED | Noted: 2022-02-24 | Resolved: 2022-09-05

## 2022-09-05 PROBLEM — U07.1 COVID-19 AFFECTING PREGNANCY IN SECOND TRIMESTER: Status: RESOLVED | Noted: 2022-01-18 | Resolved: 2022-09-05

## 2022-09-05 PROBLEM — O99.891 BACK PAIN AFFECTING PREGNANCY IN SECOND TRIMESTER: Status: RESOLVED | Noted: 2022-02-14 | Resolved: 2022-09-05

## 2022-09-05 PROBLEM — O99.213 MATERNAL OBESITY, ANTEPARTUM, THIRD TRIMESTER: Status: RESOLVED | Noted: 2022-04-02 | Resolved: 2022-09-05

## 2022-10-11 NOTE — PATIENT INSTRUCTIONS
Breast Self Exam for Women   AMBULATORY CARE:   A breast self-exam (BSE)  is a way to check your breasts for lumps and other changes  Regular BSEs can help you know how your breasts normally look and feel  Most breast lumps or changes are not cancer, but you should always have them checked by a healthcare provider  Why you should do a BSE:  Breast cancer is the most common type of cancer in women  Even if you have mammograms, you may still want to do a BSE regularly  If you know how your breasts normally feel and look, it may help you know when to contact your healthcare provider  Mammograms can miss some cancers  You may find a lump during a BSE that did not show up on a mammogram   When you should do a BSE:  If you have periods, you may want to do your BSE 1 week after your period ends  This is the time when your breasts may be the least swollen, lumpy, or tender  You can do regular BSEs even if you are breastfeeding or have breast implants  Call your doctor if:   You find any lumps or changes in your breasts  You have breast pain or fluid coming from your nipples  You have questions or concerns about your condition or care  How to do a BSE:       Look at your breasts in a mirror  Look at the size and shape of each breast and nipple  Check for swelling, lumps, dimpling, scaly skin, or other skin changes  Look for nipple changes, such as a nipple that is painful or beginning to pull inward  Gently squeeze both nipples and check to see if fluid (that is not breast milk) comes out of them  If you find any of these or other breast changes, contact your healthcare provider  Check your breasts while you sit or  the following 3 positions:    Trinna Broaden your arms down at your sides  Raise your hands and join them behind your head  Put firm pressure with your hands on your hips  Bend slightly forward while you look at your breasts in the mirror  Lie down and feel your breasts    When you lie down, your breast tissue spreads out evenly over your chest  This makes it easier for you to feel for lumps and anything that may not be normal for your breasts  Do a BSE on one breast at a time  Place a small pillow or towel under your left shoulder  Put your left arm behind your head  Use the 3 middle fingers of your right hand  Use your fingertip pads, on the top of your fingers  Your fingertip pad is the most sensitive part of your finger  Use small circles to feel your breast tissue  Use your fingertip pads to make dime-sized, overlapping circles on your breast and armpits  Use light, medium, and firm pressure  First, press lightly  Second, press with medium pressure to feel a little deeper into the breast  Last, use firm pressure to feel deep within your breast     Examine your entire breast area  Examine the breast area from above the breast to below the breast where you feel only ribs  Make small circles with your fingertips, starting in the middle of your armpit  Make circles going up and down the breast area  Continue toward your breast and all the way across it  Examine the area from your armpit all the way over to the middle of your chest (breastbone)  Stop at the middle of your chest     Move the pillow or towel to your right shoulder, and put your right arm behind your head  Use the 3 fingertip pads of your left hand, and repeat the above steps to do a BSE on your right breast     What else you can do to check for breast problems or cancer:  Talk to your healthcare provider about mammograms  A mammogram is an x-ray of your breasts to screen for breast cancer or other problems  Your provider can tell you the benefits and risks of mammograms  The first mammogram is usually at age 39 or 48  Your provider may recommend you start at 36 or younger if your risk for breast cancer is high  Mammograms usually continue every 1 to 2 years until age 76         Follow up with your doctor as directed:  Write down your questions so you remember to ask them during your visits  © Copyright [employer] 2022 Information is for End User's use only and may not be sold, redistributed or otherwise used for commercial purposes  All illustrations and images included in CareNotes® are the copyrighted property of [employer] D A M [employer] Inc  or Alma Diego  The above information is an  only  It is not intended as medical advice for individual conditions or treatments  Talk to your doctor, nurse or pharmacist before following any medical regimen to see if it is safe and effective for you  Wellness Visit for Adults   AMBULATORY CARE:   A wellness visit  is when you see your healthcare provider to get screened for health problems  Your healthcare provider will also give you advice on how to stay healthy  Write down your questions so you remember to ask them  Ask your healthcare provider how often you should have a wellness visit  What happens at a wellness visit:  Your healthcare provider will ask about your health, and your family history of health problems  This includes high blood pressure, heart disease, and cancer  He or she will ask if you have symptoms that concern you, if you smoke, and about your mood  You may also be asked about your intake of medicines, supplements, food, and alcohol  Any of the following may be done: Your weight  will be checked  Your height may also be checked so your body mass index (BMI) can be calculated  Your BMI shows if you are at a healthy weight  Your blood pressure  and heart rate will be checked  Your temperature may also be checked  Blood and urine tests  may be done  Blood tests may be done to check your cholesterol levels  Abnormal cholesterol levels increase your risk for heart disease and stroke  You may also need a blood or urine test to check for diabetes if you are at increased risk  Urine tests may be done to look for signs of an infection or kidney disease      A physical exam  includes checking your heartbeat and lungs with a stethoscope  Your healthcare provider may also check your skin to look for sun damage  Screening tests  may be recommended  A screening test is done to check for diseases that may not cause symptoms  The screening tests you may need depend on your age, gender, family history, and lifestyle habits  For example, colorectal screening may be recommended if you are 48years old or older  Screening tests you need if you are a woman:   A Pap smear  is used to screen for cervical cancer  Pap smears are usually done every 3 to 5 years depending on your age  You may need them more often if you have had abnormal Pap smear test results in the past  Ask your healthcare provider how often you should have a Pap smear  A mammogram  is an x-ray of your breasts to screen for breast cancer  Experts recommend mammograms every 2 years starting at age 48 years  You may need a mammogram at age 52 years or younger if you have an increased risk for breast cancer  Talk to your healthcare provider about when you should start having mammograms and how often you need them  Vaccines you may need:   Get an influenza vaccine  every year  The influenza vaccine protects you from the flu  Several types of viruses cause the flu  The viruses change over time, so new vaccines are made each year  Get a tetanus-diphtheria (Td) booster vaccine  every 10 years  This vaccine protects you against tetanus and diphtheria  Tetanus is a severe infection that may cause painful muscle spasms and lockjaw  Diphtheria is a severe bacterial infection that causes a thick covering in the back of your mouth and throat  Get a human papillomavirus (HPV) vaccine  if you are female and aged 23 to 32 or male 23 to 24 and never received it  This vaccine protects you from HPV infection  HPV is the most common infection spread by sexual contact   HPV may also cause vaginal, penile, and anal cancers  Get a pneumococcal vaccine  if you are aged 72 years or older  The pneumococcal vaccine is an injection given to protect you from pneumococcal disease  Pneumococcal disease is an infection caused by pneumococcal bacteria  The infection may cause pneumonia, meningitis, or an ear infection  Get a shingles vaccine  if you are 60 or older, even if you have had shingles before  The shingles vaccine is an injection to protect you from the varicella-zoster virus  This is the same virus that causes chickenpox  Shingles is a painful rash that develops in people who had chickenpox or have been exposed to the virus  How to eat healthy:  My Plate is a model for planning healthy meals  It shows the types and amounts of foods that should go on your plate  Fruits and vegetables make up about half of your plate, and grains and protein make up the other half  A serving of dairy is included on the side of your plate  The amount of calories and serving sizes you need depends on your age, gender, weight, and height  Examples of healthy foods are listed below:  Eat a variety of vegetables  such as dark green, red, and orange vegetables  You can also include canned vegetables low in sodium (salt) and frozen vegetables without added butter or sauces  Eat a variety of fresh fruits , canned fruit in 100% juice, frozen fruit, and dried fruit  Include whole grains  At least half of the grains you eat should be whole grains  Examples include whole-wheat bread, wheat pasta, brown rice, and whole-grain cereals such as oatmeal     Eat a variety of protein foods such as seafood (fish and shellfish), lean meat, and poultry without skin (turkey and chicken)  Examples of lean meats include pork leg, shoulder, or tenderloin, and beef round, sirloin, tenderloin, and extra lean ground beef  Other protein foods include eggs and egg substitutes, beans, peas, soy products, nuts, and seeds      Choose low-fat dairy products such as skim or 1% milk or low-fat yogurt, cheese, and cottage cheese  Limit unhealthy fats  such as butter, hard margarine, and shortening  Exercise:  Exercise at least 30 minutes per day on most days of the week  Some examples of exercise include walking, biking, dancing, and swimming  You can also fit in more physical activity by taking the stairs instead of the elevator or parking farther away from stores  Include muscle strengthening activities 2 days each week  Regular exercise provides many health benefits  It helps you manage your weight, and decreases your risk for type 2 diabetes, heart disease, stroke, and high blood pressure  Exercise can also help improve your mood  Ask your healthcare provider about the best exercise plan for you  General health and safety guidelines:   Do not smoke  Nicotine and other chemicals in cigarettes and cigars can cause lung damage  Ask your healthcare provider for information if you currently smoke and need help to quit  E-cigarettes or smokeless tobacco still contain nicotine  Talk to your healthcare provider before you use these products  Limit alcohol  A drink of alcohol is 12 ounces of beer, 5 ounces of wine, or 1½ ounces of liquor  Lose weight, if needed  Being overweight increases your risk of certain health conditions  These include heart disease, high blood pressure, type 2 diabetes, and certain types of cancer  Protect your skin  Do not sunbathe or use tanning beds  Use sunscreen with a SPF 15 or higher  Apply sunscreen at least 15 minutes before you go outside  Reapply sunscreen every 2 hours  Wear protective clothing, hats, and sunglasses when you are outside  Drive safely  Always wear your seatbelt  Make sure everyone in your car wears a seatbelt  A seatbelt can save your life if you are in an accident  Do not use your cell phone when you are driving  This could distract you and cause an accident   Pull over if you need to make a call or send a text message  Practice safe sex  Use latex condoms if are sexually active and have more than one partner  Your healthcare provider may recommend screening tests for sexually transmitted infections (STIs)  Wear helmets, lifejackets, and protective gear  Always wear a helmet when you ride a bike or motorcycle, go skiing, or play sports that could cause a head injury  Wear protective equipment when you play sports  Wear a lifejacket when you are on a boat or doing water sports  © Copyright [employer] 2022 Information is for End User's use only and may not be sold, redistributed or otherwise used for commercial purposes  All illustrations and images included in CareNotes® are the copyrighted property of A D A M , Inc  or Alma Win   The above information is an  only  It is not intended as medical advice for individual conditions or treatments  Talk to your doctor, nurse or pharmacist before following any medical regimen to see if it is safe and effective for you  HPV (Human Papillomavirus) Vaccine for Adults   AMBULATORY CARE:   The human papillomavirus (HPV) vaccine  is an injection given to females and males to protect against human papillomavirus infection  HPV is most commonly spread through sexual activity  It can also be spread from a mother to her baby during delivery  The HPV vaccine is most effective if given before sexual activity begins  This allows your body to build almost complete protection against HPV before you have contact with the virus  The HPV vaccine is still effective after sexual activity has begun  How the vaccine is given:  The HPV vaccine can be given with other vaccines  The vaccine is given in 2 or 3 doses through age 32: The first dose  is given at any time  The second dose  is given 1 to 2 months after the first dose  The third dose, if needed,  is given 6 months after the first dose      Reasons you should not get the HPV vaccine, or should wait to get it: You had a severe allergic reaction to a dose of the vaccine  You are pregnant  Your healthcare provider will tell you when you can get the vaccine  You are sick or have a fever  You may need to wait to get the vaccine until symptoms go away  Risks of the HPV vaccine: You may have pain, redness, or swelling where the shot was given  You may have a fever or headache  You may have an allergic reaction to the vaccine  This can be life-threatening  Call your local emergency number (911 in the [address]) if:   You have signs of a severe allergic reaction, such as trouble breathing, hives, or wheezing  Seek care immediately if:   You have a high fever or behavior changes that concern you  Call your doctor if:   You have questions or concerns about the HPV vaccine  Apply a warm compress  to the area to relieve swelling and pain  Follow up with your doctor as directed:  Write down your questions so you remember to ask them during your visits  © Copyright [employer] 2022 Information is for End User's use only and may not be sold, redistributed or otherwise used for commercial purposes  All illustrations and images included in CareNotes® are the copyrighted property of A D A M , Inc  or [employer]   The above information is an  only  It is not intended as medical advice for individual conditions or treatments  Talk to your doctor, nurse or pharmacist before following any medical regimen to see if it is safe and effective for you  Perineal Hygiene     No soaps or feminine wash to the vulva  Use only water to cleanse, or water with Dove or [employer] if necessary  No lotion to the area  Use only coconut oil for moisture if needed   No douching     Cotton underware, loose fitting clothing  Only perfume-free, dye-free laundry detergent, use a second rinse cycle   Avoid fabric softeners/dryer sheets        Coconut oil as a lubricant (if not using condoms) or another scent-free lubricant (Astroglide, Uberlube) if needed  Partner to avoid the same products as well  Over the counter probiotic to restore vaginal rosemary may be helpful as well     You may also look into Boric Acid vaginal suppositories to restore vaginal PH balance for up to 2 weeks as directed on the box  You may not use these if you are pregnant Kegel Exercises for Women   AMBULATORY CARE:   Kegel exercises  help strengthen your pelvic muscles  Pelvic muscles hold your pelvic organs, such as your bladder and uterus, in place  Kegel exercises help prevent or control problems with urine incontinence (leakage)  Incontinence may be caused by pregnancy, childbirth, or menopause  Contact your healthcare provider if:   You cannot feel your muscles tighten or relax  You continue to leak urine  You have questions or concerns about your condition or care  Use the correct muscles:  Pelvic muscles are the muscles you use to control urine flow  To target these muscles, stop and start the flow of urine several times  This will help you become familiar with how it feels to tighten and relax these muscles  How to do Kegel exercises:   Empty your bladder  You may lie down, stand up, or sit down to do these exercises  When you first try to do these exercises, it may be easier if you lie down  Tighten or squeeze your pelvic muscles slowly  It may feel like you are trying to hold back urine or gas  Hold this position for 3 seconds  Relax for 3 seconds  Repeat this cycle 10 times  Do 10 sets of Kegel exercises, at least 3 times a day  Do not hold your breath when you do Kegel exercises  Keep your stomach, back, and leg muscles relaxed  As your muscles get stronger, you will be able to hold the squeeze longer  Your healthcare provider may ask that you increase your pelvic muscle squeeze to 10 seconds  After you squeeze for 10 seconds, relax for 10 seconds      What else you should know:   Once you know how to do Kegel exercises, use different positions  You can do these exercises while you lie on the floor, sit at your desk or watch TV, and while you stand  You may notice improved bladder control within about 6 weeks  Tighten your pelvic muscles before you sneeze, cough, or lift to prevent urine leakage  Follow up with your doctor as directed:  Write down your questions so you remember to ask them during your visits  © Copyright [employer] 2022 Information is for End User's use only and may not be sold, redistributed or otherwise used for commercial purposes  All illustrations and images included in CareNotes® are the copyrighted property of A D A M , Inc  or [facility] An valerie   The above information is an  only  It is not intended as medical advice for individual conditions or treatments  Talk to your doctor, nurse or pharmacist before following any medical regimen to see if it is safe and effective for you

## 2022-10-11 NOTE — PROGRESS NOTES
Diagnoses and all orders for this visit:    Encounter for gynecological examination without abnormal finding        Perineal hygiene reviewed   Weight bearing exercises minium of 150 mins/weekly advised  Kegel exercises recommended  SBE encouraged, ASCCP guidelines reviewed  Condoms encouraged with all sexual activity to prevent STI's  Gardisil vaccines recommended up to age 39  Calcium/ Vit D dietary requirements discussed,   Advised to call with any issues,  all concerns & questions addressed  See provided information in your after visit summary     F/U Annually and PRN      Health Maintenance:    Last PAP: 06/21/2021 Neg   Next PAP Due: 2024    Last Mammogram: Not on file  advised age 36     Last Colonoscopy: Not on file    advised age 39    Gardisil: unsure, will check with her mother       Subjective    CC: Yearly Exam      Bear Wilkins is a 32 y o  female here for an annual exam  Rashawn Kohli  GYN hx includes:  C section for Breech 5/17/22, baby is doing great, formula feeding    No personal Hx of breast, cervical, ovarian or colon CA  Family hx of:  No GYN cancers  Medically stable, reports no changes in medical Hx, follows with PMD  C/o sciatica pain at times, this developed during her pregnancy and has not resolved,  advised to seek care with PMD, ortho, PT or try stretching and back strengthening exercises at home  If any referrals are needed I will provide them     Patient's last menstrual period was 09/22/2022 (exact date)  Her menstrual cycles are regular every 28-30 days  She denies issues with bleeding or her menses  Denies history of abnormal pap smear  She denies breast concerns, abnormal vaginal discharge, vaginal itching, odor, irritation, bowel/bladder dysfunction, urinary symptoms, pelvic pain, or dyspareunia today  She is sexually active  Monogamous relationship  Her current method of contraception includes none  Denies any issues with her BCM    She was on [facility] in the past and has no desire to restart  She does not want STD testing today  Denies intimate partner violence    Past Medical History:   Diagnosis Date   • Mononucleosis    • Pleurisy    • Varicella     disease     Past Surgical History:   Procedure Laterality Date   • [place]  DELIVERY ONLY N/A 2022    Procedure:  SECTION (); Surgeon: Tanya Patton MD;  Location: [facility];  Service: Obstetrics   • WISDOM TOOTH EXTRACTION         Immunization History   Administered Date(s) Administered   • Influenza, injectable, quadrivalent, preservative free 0 5 mL 2021   • Tdap 03/15/2022, 03/15/2022       Family History   Problem Relation Age of Onset   • No Known Problems Mother    • Diabetes Father    • Hypertension Father    • No Known Problems Brother    • No Known Problems Daughter    • No Known Problems Maternal Grandmother    • No Known Problems Maternal Grandfather    • No Known Problems Paternal Grandmother    • Bone cancer Paternal Grandfather      Social History     Tobacco Use   • Smoking status: Former Smoker     Packs/day: 0 25     Types: Cigarettes     Quit date: 9/15/2021     Years since quittin 0   • Smokeless tobacco: Never Used   • Tobacco comment: quit mid sept   Vaping Use   • Vaping Use: Never used   Substance Use Topics   • Alcohol use: Not Currently   • Drug use: No       Current Outpatient Medications:   •  docusate sodium (COLACE) 100 mg capsule, Take 1 capsule (100 mg total) by mouth in the morning and 1 capsule (100 mg total) in the evening   (Patient not taking: Reported on 10/12/2022), Disp: 30 capsule, Rfl: 0  •  ibuprofen (MOTRIN) 600 mg tablet, Take 1 tablet (600 mg total) by mouth every 6 (six) hours (Patient not taking: Reported on 10/12/2022), Disp: 30 tablet, Rfl: 0  •  Prenatal Vit-Fe Fumarate-FA (PRENATAL VITAMINS PO), Take by mouth (Patient not taking: Reported on 10/12/2022), Disp: , Rfl:   Patient Active Problem List    Diagnosis Date Noted   • Personal history of COVID-19 2022   • History of syncope 2021       No Known Allergies    OB History    Para Term  AB Living   1 1 1 0 0 1   SAB IAB Ectopic Multiple Live Births   0 0 0 0 1      # Outcome Date GA Lbr Stephen/2nd Weight Sex Delivery Anes PTL Lv   1 Term 22 38w4d  3425 g (7 lb 8 8 oz) F CS-LTranv Spinal  PONCE       Vitals:    10/12/22 1515   BP: 118/70   BP Location: Right arm   Patient Position: Sitting   Cuff Size: Large   Weight: 94 3 kg (208 lb)   Height: 5' 5" (1 651 m)     Body mass index is 34 61 kg/m²  Review of Systems     Constitutional: Negative for chills, fatigue, fever, headaches, visual disturbances, and unexpected weight change  Respiratory: Negative for cough, & shortness of breath  Cardiovascular: Negative for chest pain       Gastrointestinal: Negative for Abd pain, nausea & vomiting, constipation and diarrhea  Genitourinary: Negative for difficulty urinating, dysuria, hematuria, dyspareunia, unusual vaginal bleeding or discharge  Skin: Negative skin changes    Physical Exam     Constitutional: Alert & Oriented x3, well-developed and well-nourished  No distress  HENT: Atraumatic, Normocephalic, Conjunctivae clear  Neck: Normal range of motion  Neck supple  No thyromegaly, mass, nodules or tenderness  Pulmonary: Effort normal    Abdominal: Soft  No tenderness or masses  Musculoskeletal: Normal ROM  Skin: Warm & Dry  Psychological: Normal mood, thought content, behavior & judgement     Breasts:   Right: tissue soft without masses, tenderness, skin changes or nipple discharge  No areas of erythema or pain  No subclavicular, axillary, pectoral adenopathy  Left:  tissue soft without masses, tenderness, skin changes or nipple discharge  No areas of erythema or pain  No subclavicular, axillary, pectoral adenopathy    Pelvic exam was performed with patient supine, lithotomy position  Labia: Negative rash, tenderness, lesion or injury on the right labia  Negative rash, tenderness, lesion or injury on the left labia  Urethral meatus:  Negative for  tenderness, inflammation or discharge  Uterus: not deviated, enlarged, fixed or tender  Cervix: No CMT, no discharge or friability  Right adnexa: no mass, no tenderness and no fullness  Left adnexa: no mass, no tenderness and no fullness  Vagina: No erythema, tenderness, masses, or foreign body in the vagina  No signs of injury around the vagina  No unusual vaginal discharge   Perineum without lesions, signs of injury, erythema or swelling  Inguinal Canal:        Right: No inguinal adenopathy or hernia present  Left: No inguinal adenopathy or hernia present

## 2022-10-12 ENCOUNTER — ANNUAL EXAM (OUTPATIENT)
Dept: OBGYN CLINIC | Facility: CLINIC | Age: 27
End: 2022-10-12
Payer: COMMERCIAL

## 2022-10-12 VITALS
HEIGHT: 65 IN | BODY MASS INDEX: 34.66 KG/M2 | DIASTOLIC BLOOD PRESSURE: 70 MMHG | WEIGHT: 208 LBS | SYSTOLIC BLOOD PRESSURE: 118 MMHG

## 2022-10-12 DIAGNOSIS — Z01.419 ENCOUNTER FOR GYNECOLOGICAL EXAMINATION WITHOUT ABNORMAL FINDING: Primary | ICD-10-CM

## 2022-10-12 PROCEDURE — S0612 ANNUAL GYNECOLOGICAL EXAMINA: HCPCS | Performed by: OBSTETRICS & GYNECOLOGY

## 2023-10-10 NOTE — PROGRESS NOTES
Diagnoses and all orders for this visit:    Encounter for gynecological examination without abnormal finding    Vaginal odor  -     POCT wet mount      Results for orders placed or performed in visit on 10/13/23   POCT wet mount   Result Value Ref Range    Yeast, Wet Prep Neg     pH 4.0     Whiff Test Neg     Clue Cells Neg     Trich, Wet Prep Neg          Perineal hygiene reviewed   Weight bearing exercises minium of 150 mins/weekly advised. Kegel exercises recommended. SBE encouraged, ASCCP guidelines reviewed. Condoms encouraged with all sexual activity to prevent STI's. Gardisil vaccines recommended up to age 39  Calcium/ Vit D dietary requirements discussed,   Advised to call with any issues,  all concerns & questions addressed. See provided information in your after visit summary     F/U Annually and PRN      Health Maintenance:    Last PAP: 06/21/2021 Negative   Next PAP OFL:7525    Last Mammogram: Not on file  advised age 36     Last Colonoscopy: Not on file    advised age 39    Gardisil:  Not completed   Gardasil 9 was advised for prevention of HPV-related disease. We discussed risks/benefits, SE's/AE's at length and all questions were answered. Written info was provided for review. The patient agrees to consider and is aware she may call to schedule injection #1 at any time. Declines       Subjective    CC: Yearly Exam      Bob Matson is a 29 y.o. female here for an annual exam. Deanie Habermann  GYN hx includes:  1 c section   No personal Hx of breast, cervical, ovarian or colon CA. Family hx of:  No GYN cancers  Medically stable, reports no changes in medical Hx, follows with PMD    Patient's last menstrual period was 09/19/2023 (exact date). Her menstrual cycles are regular every 28-30 days. She denies issues with bleeding during her menses. Denies history of abnormal pap smear.   She denies breast concerns, abnormal vaginal discharge, vaginal itching, irritation, bowel/bladder dysfunction, urinary symptoms, pelvic pain, or dyspareunia today. Mentions occasional vaginal odor, unable to describe the scent. No irritation, itching or abnormal vaginal discharge , wet mount Neg, most probable normal odor variations from hormones and sweat glands   She is sexually active. Monogamous relationship. Her current method of contraception includes none. Denies any issues with her BCM. She does not want STD testing today. 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     Past Medical History:   Diagnosis Date    Mononucleosis     Pleurisy     Varicella     disease     Past Surgical History:   Procedure Laterality Date     SECTION      [place]  DELIVERY ONLY N/A 2022    Procedure:  SECTION (); Surgeon: Leelee Del Valle MD;  Location: [facility] ;  Service: Obstetrics    WISDOM TOOTH EXTRACTION         Immunization History   Administered Date(s) Administered    Influenza, injectable, quadrivalent, preservative free 0.5 mL 2021    Tdap 03/15/2022, 03/15/2022       Family History   Problem Relation Age of Onset    No Known Problems Mother     Diabetes Father     Hypertension Father     No Known Problems Brother     No Known Problems Daughter     No Known Problems Maternal Grandmother     No Known Problems Maternal Grandfather     No Known Problems Paternal Grandmother     Bone cancer Paternal Grandfather      Social History     Tobacco Use    Smoking status: Former     Packs/day: 0.25     Types: Cigarettes     Quit date: 9/15/2021     Years since quittin.0    Smokeless tobacco: Never    Tobacco comments:     quit mid sept   Vaping Use    Vaping Use: Never used   Substance Use Topics    Alcohol use: Not Currently    Drug use: No     No current outpatient medications on file.   Patient Active Problem List    Diagnosis Date Noted    Personal history of COVID-19 2022 History of syncope 2021       No Known Allergies    OB History    Para Term  AB Living   1 1 1 0 0 1   SAB IAB Ectopic Multiple Live Births   0 0 0 0 1      # Outcome Date GA Lbr Stephen/2nd Weight Sex Delivery Anes PTL Lv   1 Term 22 38w4d  3425 g (7 lb 8.8 oz) F CS-LTranv Spinal  PONCE       Vitals:    10/13/23 1606   BP: 120/70   BP Location: Left arm   Patient Position: Sitting   Cuff Size: Large   Weight: 88.9 kg (196 lb)   Height: 5' 5" (1.651 m)     Body mass index is 32.62 kg/m². Review of Systems     Constitutional: Negative for chills, fatigue, fever, headaches, visual disturbances, and unexpected weight change. Respiratory: Negative for cough, & shortness of breath. Cardiovascular: Negative for chest pain. .    Gastrointestinal: Negative for Abd pain, nausea & vomiting, constipation and diarrhea. Genitourinary: Negative for difficulty urinating, dysuria, hematuria, dyspareunia, unusual vaginal bleeding or discharge  Skin: Negative skin changes    Physical Exam     Constitutional: Alert & Oriented x3, well-developed and well-nourished. No distress. HENT: Atraumatic, Normocephalic, Conjunctivae clear  Neck: Normal range of motion. Neck supple. No thyromegaly, mass, nodules or tenderness  Pulmonary: Effort normal.   Abdominal: Soft. No tenderness or masses  Musculoskeletal: Normal ROM  Skin: Warm & Dry  Psychological: Normal mood, thought content, behavior & judgement     Breasts:   Right: tissue soft without masses, tenderness, skin changes or nipple discharge. No areas of erythema or pain. No subclavicular, axillary, pectoral adenopathy  Left:  tissue soft without masses, tenderness, skin changes or nipple discharge. No areas of erythema or pain. No subclavicular, axillary, pectoral adenopathy    Pelvic exam was performed with patient supine, lithotomy position. Labia: Negative rash, tenderness, lesion or injury on the right labia.               Negative rash, tenderness, lesion or injury on the left labia. Urethral meatus:  Negative for  tenderness, inflammation or discharge. Uterus: not deviated, enlarged, fixed or tender. Cervix: No CMT, no discharge or friability. Right adnexa: no mass, no tenderness and no fullness. Left adnexa: no mass, no tenderness and no fullness. Vagina: No erythema, tenderness, masses, or foreign body in the vagina. No signs of injury around the vagina. No unusual vaginal discharge   Perineum without lesions, signs of injury, erythema or swelling. Inguinal Canal:        Right: No inguinal adenopathy or hernia present. Left: No inguinal adenopathy or hernia present.

## 2023-10-13 ENCOUNTER — ANNUAL EXAM (OUTPATIENT)
Dept: OBGYN CLINIC | Facility: CLINIC | Age: 28
End: 2023-10-13

## 2023-10-13 VITALS
SYSTOLIC BLOOD PRESSURE: 120 MMHG | DIASTOLIC BLOOD PRESSURE: 70 MMHG | WEIGHT: 196 LBS | HEIGHT: 65 IN | BODY MASS INDEX: 32.65 KG/M2

## 2023-10-13 DIAGNOSIS — Z01.419 ENCOUNTER FOR GYNECOLOGICAL EXAMINATION WITHOUT ABNORMAL FINDING: Primary | ICD-10-CM

## 2023-10-13 DIAGNOSIS — N89.8 VAGINAL ODOR: ICD-10-CM

## 2023-10-13 LAB
BV WHIFF TEST VAG QL: NORMAL
CLUE CELLS SPEC QL WET PREP: NORMAL
PH SMN: 4 [PH]
T VAGINALIS VAG QL WET PREP: NORMAL
YEAST VAG QL WET PREP: NORMAL

## 2023-10-13 NOTE — PATIENT INSTRUCTIONS
Breast Self Exam for Women   AMBULATORY CARE:   A breast self-exam (BSE)  is a way to check your breasts for lumps and other changes. Regular BSEs can help you know how your breasts normally look and feel. Most breast lumps or changes are not cancer, but you should always have them checked by a healthcare provider. Why you should do a BSE:  Breast cancer is the most common type of cancer in women. Even if you have mammograms, you may still want to do a BSE regularly. If you know how your breasts normally feel and look, it may help you know when to contact your healthcare provider. Mammograms can miss some cancers. You may find a lump during a BSE that did not show up on a mammogram.  When you should do a BSE:  If you have periods, you may want to do your BSE 1 week after your period ends. This is the time when your breasts may be the least swollen, lumpy, or tender. You can do regular BSEs even if you are breastfeeding or have breast implants. Call your doctor if:   You find any lumps or changes in your breasts. You have breast pain or fluid coming from your nipples. You have questions or concerns about your condition or care. How to do a BSE:       Look at your breasts in a mirror. Look at the size and shape of each breast and nipple. Check for swelling, lumps, dimpling, scaly skin, or other skin changes. Look for nipple changes, such as a nipple that is painful or beginning to pull inward. Gently squeeze both nipples and check to see if fluid (that is not breast milk) comes out of them. If you find any of these or other breast changes, contact your healthcare provider. Check your breasts while you sit or  the following 3 positions:    [place] your arms down at your sides. Raise your hands and join them behind your head. Put firm pressure with your hands on your hips. Bend slightly forward while you look at your breasts in the mirror. Lie down and feel your breasts.   When you lie down, your breast tissue spreads out evenly over your chest. This makes it easier for you to feel for lumps and anything that may not be normal for your breasts. Do a BSE on one breast at a time. Place a small pillow or towel under your left shoulder. Put your left arm behind your head. Use the 3 middle fingers of your right hand. Use your fingertip pads, on the top of your fingers. Your fingertip pad is the most sensitive part of your finger. Use small circles to feel your breast tissue. Use your fingertip pads to make dime-sized, overlapping circles on your breast and armpits. Use light, medium, and firm pressure. First, press lightly. Second, press with medium pressure to feel a little deeper into the breast. Last, use firm pressure to feel deep within your breast.    Examine your entire breast area. Examine the breast area from above the breast to below the breast where you feel only ribs. Make small circles with your fingertips, starting in the middle of your armpit. Make circles going up and down the breast area. Continue toward your breast and all the way across it. Examine the area from your armpit all the way over to the middle of your chest (breastbone). Stop at the middle of your chest.    Move the pillow or towel to your right shoulder, and put your right arm behind your head. Use the 3 fingertip pads of your left hand, and repeat the above steps to do a BSE on your right breast.  What else you can do to check for breast problems or cancer:  Talk to your healthcare provider about mammograms. A mammogram is an x-ray of your breasts to screen for breast cancer or other problems. Your provider can tell you the benefits and risks of mammograms. The first mammogram is usually at age 39 or 48. Your provider may recommend you start at 36 or younger if your risk for breast cancer is high. Mammograms usually continue every 1 to 2 years until age 76.        Follow up with your doctor as directed:  Write down your questions so you remember to ask them during your visits. © Copyright Kenna Bryan 2023 Information is for End User's use only and may not be sold, redistributed or otherwise used for commercial purposes. The above information is an  only. It is not intended as medical advice for individual conditions or treatments. Talk to your doctor, nurse or pharmacist before following any medical regimen to see if it is safe and effective for you. Wellness Visit for Adults   AMBULATORY CARE:   A wellness visit  is when you see your healthcare provider to get screened for health problems. Your healthcare provider will also give you advice on how to stay healthy. Write down your questions so you remember to ask them. Ask your healthcare provider how often you should have a wellness visit. What happens at a wellness visit:  Your healthcare provider will ask about your health, and your family history of health problems. This includes high blood pressure, heart disease, and cancer. He or she will ask if you have symptoms that concern you, if you smoke, and about your mood. You may also be asked about your intake of medicines, supplements, food, and alcohol. Any of the following may be done: Your weight  will be checked. Your height may also be checked so your body mass index (BMI) can be calculated. Your BMI shows if you are at a healthy weight. Your blood pressure  and heart rate will be checked. Your temperature may also be checked. Blood and urine tests  may be done. Blood tests may be done to check your cholesterol levels. Abnormal cholesterol levels increase your risk for heart disease and stroke. You may also need a blood or urine test to check for diabetes if you are at increased risk. Urine tests may be done to look for signs of an infection or kidney disease. A physical exam  includes checking your heartbeat and lungs with a stethoscope.  Your healthcare provider may also check your skin to look for sun damage. Screening tests  may be recommended. A screening test is done to check for diseases that may not cause symptoms. The screening tests you may need depend on your age, gender, family history, and lifestyle habits. For example, colorectal screening may be recommended if you are 48years old or older. Screening tests you need if you are a woman:   A Pap smear  is used to screen for cervical cancer. Pap smears are usually done every 3 to 5 years depending on your age. You may need them more often if you have had abnormal Pap smear test results in the past. Ask your healthcare provider how often you should have a Pap smear. A mammogram  is an x-ray of your breasts to screen for breast cancer. Experts recommend mammograms every 2 years starting at age 48 years. You may need a mammogram at age 52 years or younger if you have an increased risk for breast cancer. Talk to your healthcare provider about when you should start having mammograms and how often you need them. Vaccines you may need:   Get an influenza vaccine  every year. The influenza vaccine protects you from the flu. Several types of viruses cause the flu. The viruses change over time, so new vaccines are made each year. Get a tetanus-diphtheria (Td) booster vaccine  every 10 years. This vaccine protects you against tetanus and diphtheria. Tetanus is a severe infection that may cause painful muscle spasms and lockjaw. Diphtheria is a severe bacterial infection that causes a thick covering in the back of your mouth and throat. Get a human papillomavirus (HPV) vaccine  if you are female and aged 23 to 32 or male 23 to 24 and never received it. This vaccine protects you from HPV infection. HPV is the most common infection spread by sexual contact. HPV may also cause vaginal, penile, and anal cancers. Get a pneumococcal vaccine  if you are aged 72 years or older.  The pneumococcal vaccine is an injection given to protect you from pneumococcal disease. Pneumococcal disease is an infection caused by pneumococcal bacteria. The infection may cause pneumonia, meningitis, or an ear infection. Get a shingles vaccine  if you are 60 or older, even if you have had shingles before. The shingles vaccine is an injection to protect you from the varicella-zoster virus. This is the same virus that causes chickenpox. Shingles is a painful rash that develops in people who had chickenpox or have been exposed to the virus. How to eat healthy:  My Plate is a model for planning healthy meals. It shows the types and amounts of foods that should go on your plate. Fruits and vegetables make up about half of your plate, and grains and protein make up the other half. A serving of dairy is included on the side of your plate. The amount of calories and serving sizes you need depends on your age, gender, weight, and height. Examples of healthy foods are listed below:  Eat a variety of vegetables  such as dark green, red, and orange vegetables. You can also include canned vegetables low in sodium (salt) and frozen vegetables without added butter or sauces. Eat a variety of fresh fruits , canned fruit in 100% juice, frozen fruit, and dried fruit. Include whole grains. At least half of the grains you eat should be whole grains. Examples include whole-wheat bread, wheat pasta, brown rice, and whole-grain cereals such as oatmeal.    Eat a variety of protein foods such as seafood (fish and shellfish), lean meat, and poultry without skin (turkey and chicken). Examples of lean meats include pork leg, shoulder, or tenderloin, and beef round, sirloin, tenderloin, and extra lean ground beef. Other protein foods include eggs and egg substitutes, beans, peas, soy products, nuts, and seeds. Choose low-fat dairy products such as skim or 1% milk or low-fat yogurt, cheese, and cottage cheese. Limit unhealthy fats  such as butter, hard margarine, and shortening. Exercise:  Exercise at least 30 minutes per day on most days of the week. Some examples of exercise include walking, biking, dancing, and swimming. You can also fit in more physical activity by taking the stairs instead of the elevator or parking farther away from stores. Include muscle strengthening activities 2 days each week. Regular exercise provides many health benefits. It helps you manage your weight, and decreases your risk for type 2 diabetes, heart disease, stroke, and high blood pressure. Exercise can also help improve your mood. Ask your healthcare provider about the best exercise plan for you. General health and safety guidelines:   Do not smoke. Nicotine and other chemicals in cigarettes and cigars can cause lung damage. Ask your healthcare provider for information if you currently smoke and need help to quit. E-cigarettes or smokeless tobacco still contain nicotine. Talk to your healthcare provider before you use these products. Limit alcohol. A drink of alcohol is 12 ounces of beer, 5 ounces of wine, or 1½ ounces of liquor. Lose weight, if needed. Being overweight increases your risk of certain health conditions. These include heart disease, high blood pressure, type 2 diabetes, and certain types of cancer. Protect your skin. Do not sunbathe or use tanning beds. Use sunscreen with a SPF 15 or higher. Apply sunscreen at least 15 minutes before you go outside. Reapply sunscreen every 2 hours. Wear protective clothing, hats, and sunglasses when you are outside. Drive safely. Always wear your seatbelt. Make sure everyone in your car wears a seatbelt. A seatbelt can save your life if you are in an accident. Do not use your cell phone when you are driving. This could distract you and cause an accident. Pull over if you need to make a call or send a text message. Practice safe sex. Use latex condoms if are sexually active and have more than one partner.  Your healthcare provider may recommend screening tests for sexually transmitted infections (STIs). Wear helmets, lifejackets, and protective gear. Always wear a helmet when you ride a bike or motorcycle, go skiing, or play sports that could cause a head injury. Wear protective equipment when you play sports. Wear a lifejacket when you are on a boat or doing water sports. © Copyright Jimy Escobedo 2023 Information is for End User's use only and may not be sold, redistributed or otherwise used for commercial purposes. The above information is an  only. It is not intended as medical advice for individual conditions or treatments. Talk to your doctor, nurse or pharmacist before following any medical regimen to see if it is safe and effective for you. HPV (Human Papillomavirus)   AMBULATORY CARE:   Human Papillomavirus (HPV)  is the name for a group of viruses that can infect your skin or other parts of your body. HPV is the most common infection spread by sexual contact. It can also be spread from a mother to her baby during delivery. Common symptoms include the following:   Painless warts in your mouth or on your genitals    Genital or anal discharge, bleeding, itching, or pain    Pain when you urinate    Call your doctor if:   You have new or worsening symptoms. You have questions or concerns about your condition or care. HPV diagnosis:  Your healthcare provider may use a vinegar liquid to help diagnose HPV genital warts. Women 27to 72years old can be checked for HPV during regular cervical cancer screenings. An HPV test checks for certain types of HPV that can cause changes in cervical cells. Without treatment, the changed cells can become cancer. An HPV test can be done every 5 years if the results show no infection. The test can be done with or without a Pap smear. A Pap smear checks for cancer or for abnormal cells that can become cancer.  You may be tested for HPV if you have mouth or throat cancer. Treatment:  HPV cannot be cured, but an infection may go away on its own in about 2 years without causing problems. If the infection continues, some types of HPV can lead to health conditions that need to be treated. Examples include warts and certain cancers, especially squamous cell carcinoma (SCC). HPV-linked SCCs commonly develop in the anus, throat (called oropharyngeal cancer), cervix, vagina, penis, or mouth. HPV can also cause a type of cervical cancer called adenocarcinoma. Symptoms of any of these conditions may not develop for several years after you were exposed to HPV. You will need to be monitored closely. Ask your healthcare provider for more information about monitoring, conditions caused by HPV, and available treatments. Prevent an HPV infection:  HPV is usually spread through sexual activity. The following can help prevent infection:  Ask about the HPV vaccine. The HPV vaccine is given to females and males, usually at 6or 15years of age. It can be given from 9 years through 39years of age, if needed. It is most effective if given before sexual activity begins. Use a new condom, contraceptive barrier, or dental dam each time you have sex. This includes oral, vaginal, and anal sex. Talk to your healthcare provider if you have any questions about what to use or how to use it. Follow up with your doctor as directed:  Write down your questions so you remember to ask them during your visits. © Copyright [facility] 2023 Information is for End User's use only and may not be sold, redistributed or otherwise used for commercial purposes. The above information is an  only. It is not intended as medical advice for individual conditions or treatments. Talk to your doctor, nurse or pharmacist before following any medical regimen to see if it is safe and effective for you.        Perineal Hygiene      Your vaginal naturally takes care of its self, it is a self washing system, the less you mess the healthier it will be     No soaps or feminine wash to the vulva, these products can cause dermitis, bacterial infections and other vulvar problems. Use only water to cleanse, or water with Dove or [employer] if necessary. No scented lotions or products are advised in or near your vulva. Use only coconut oil for moisture if needed. No douching this may cause imbalance in your vaginal PH and further issues. If you wear panty liners, you may apply a thin coating of Vaseline, A&D ointment or coconut oil to the vulvar tissues as a skin barrier     [place] underware, loose fitting clothing  Only perfume-free, dye-free laundry detergent, use a second rinse cycle   Avoid fabric softeners/dryer sheets. Partner should avoid the same products as well. Over the counter probiotic to restore vaginal rosemary may be helpful as well, take daily. You may also look into Boric Acid vaginal suppositories to restore vaginal PH balance for up to 2 weeks as directed on the box. You may not use these if you are pregnant      For vaginal dryness: You may use:     Coconut oil (organic, pure, unscented) as needed for moisture or lubrication. ( Do not use if allergic)       Replens moisture restore external comfort gel daily ( use as directed on the box)        Replens long lasting vaginal moisturizer  ( use as directed on the box)         For Vaginal Lubrication:          You may use:     Coconut oil (organic, pure, unscented) as a lubricant or another scent-free lubricant (Astroglide, Uberlube) if needed. Do not use coconut oil or silicone if using a condom as this may break down the integrity of the condom and cause an unplanned pregnancy              Do not use coconut oil if allergic               Replens silky smooth lubricant, premium silicone based lubricant for intercourse.  ( use as directed, a small amount will provide an enhanced natural feeling)     Any premium over the counter vaginal lubricant water or silicone based. Silicone based will have more staying power.

## 2023-10-20 ENCOUNTER — OFFICE VISIT (OUTPATIENT)
Dept: URGENT CARE | Facility: CLINIC | Age: 28
End: 2023-10-20
Payer: COMMERCIAL

## 2023-10-20 VITALS
DIASTOLIC BLOOD PRESSURE: 73 MMHG | WEIGHT: 196 LBS | HEART RATE: 75 BPM | OXYGEN SATURATION: 100 % | TEMPERATURE: 98 F | BODY MASS INDEX: 32.62 KG/M2 | SYSTOLIC BLOOD PRESSURE: 128 MMHG

## 2023-10-20 DIAGNOSIS — J06.9 UPPER RESPIRATORY TRACT INFECTION, UNSPECIFIED TYPE: Primary | ICD-10-CM

## 2023-10-20 PROCEDURE — 99213 OFFICE O/P EST LOW 20 MIN: CPT | Performed by: PHYSICIAN ASSISTANT

## 2023-10-20 RX ORDER — METHYLPREDNISOLONE 4 MG/1
TABLET ORAL
Qty: 21 EACH | Refills: 0 | Status: SHIPPED | OUTPATIENT
Start: 2023-10-20

## 2023-10-20 RX ORDER — ALBUTEROL SULFATE 90 UG/1
2 AEROSOL, METERED RESPIRATORY (INHALATION) EVERY 6 HOURS PRN
Qty: 8 G | Refills: 0 | Status: SHIPPED | OUTPATIENT
Start: 2023-10-20

## 2023-10-20 NOTE — PROGRESS NOTES
[facility]        NAME: Ariana Richmond is a 29 y.o. female  : 1995    MRN: [id number]  DATE: 2023  TIME: 11:50 AM    Assessment and Plan   Upper respiratory tract infection, unspecified type [J06.9]  1. Upper respiratory tract infection, unspecified type  albuterol (PROVENTIL HFA,VENTOLIN HFA) 90 mcg/act inhaler    methylPREDNISolone 4 MG tablet therapy pack            Patient Instructions     Use Medrol as directed until completed  Use albuterol as directed as needed for symptom relief  Continue with home over-the-counter's as needed  Follow up with PCP in 3-5 days. Proceed to  ER if symptoms worsen. Chief Complaint     Chief Complaint   Patient presents with    Cough     Pt is here for chest congestion since Tuesday. She has also lost her voice. She has been taking delsym, without relief. History of Present Illness       60-year-old female presents with chest congestion intermittent mild dry cough. Has intermittent sore throats when she is coughing a lot. Denies any fevers or chills. No runny nose or congestion. Denies any ear pain. No headaches. No fevers or chills. No abdominal pain nausea vomiting or diarrhea. Denies any chest pain. Has been taking some over-the-counter Delsym with minimal relief. Cough  This is a new problem. The current episode started in the past 7 days. The problem has been unchanged. The problem occurs every few hours. The cough is Non-productive. Pertinent negatives include no chest pain, ear congestion, ear pain, fever, myalgias, nasal congestion, shortness of breath or wheezing. Nothing aggravates the symptoms. She has tried OTC cough suppressant for the symptoms. The treatment provided no relief. There is no history of asthma. Review of Systems   Review of Systems   Constitutional: Negative. Negative for fever. HENT:  Positive for voice change. Negative for ear pain. Eyes: Negative.     Respiratory:  Positive for cough. Negative for shortness of breath and wheezing. Cardiovascular: Negative. Negative for chest pain. Gastrointestinal: Negative. Musculoskeletal: Negative. Negative for myalgias. Skin: Negative. Neurological: Negative. Current Medications       Current Outpatient Medications:     albuterol (PROVENTIL HFA,VENTOLIN HFA) 90 mcg/act inhaler, Inhale 2 puffs every 6 (six) hours as needed for wheezing or shortness of breath, Disp: 8 g, Rfl: 0    methylPREDNISolone 4 MG tablet therapy pack, Use as directed on package, Disp: 21 each, Rfl: 0    Current Allergies     Allergies as of 10/20/2023    (No Known Allergies)            The following portions of the patient's history were reviewed and updated as appropriate: allergies, current medications, past family history, past medical history, past social history, past surgical history and problem list.     Past Medical History:   Diagnosis Date    Mononucleosis     Pleurisy     Varicella     disease       Past Surgical History:   Procedure Laterality Date     SECTION      [place]  DELIVERY ONLY N/A 2022    Procedure:  SECTION (); Surgeon: Claire Fleming MD;  Location: [facility] ;  Service: Obstetrics    WISDOM TOOTH EXTRACTION         Family History   Problem Relation Age of Onset    No Known Problems Mother     Diabetes Father     Hypertension Father     No Known Problems Brother     No Known Problems Daughter     No Known Problems Maternal Grandmother     No Known Problems Maternal Grandfather     No Known Problems Paternal Grandmother     Bone cancer Paternal Grandfather          Medications have been verified. Objective   /73   Pulse 75   Temp 98 °F (36.7 °C)   Wt 88.9 kg (196 lb)   LMP 2023 (Exact Date)   SpO2 100%   BMI 32.62 kg/m²   Patient's last menstrual period was 2023 (exact date). Physical Exam     Physical Exam  Vitals and nursing note reviewed.    Constitutional: General: She is not in acute distress. Appearance: Normal appearance. She is well-developed. HENT:      Head: Normocephalic and atraumatic. Right Ear: Hearing, tympanic membrane, ear canal and external ear normal. There is no impacted cerumen. Left Ear: Hearing, tympanic membrane, ear canal and external ear normal. There is no impacted cerumen. Nose: Nose normal.      Mouth/Throat:      Pharynx: Uvula midline. No oropharyngeal exudate. Eyes:      General:         Right eye: No discharge. Left eye: No discharge. Conjunctiva/sclera: Conjunctivae normal.   Cardiovascular:      Rate and Rhythm: Normal rate and regular rhythm. Heart sounds: Normal heart sounds. No murmur heard. Pulmonary:      Effort: Pulmonary effort is normal. No respiratory distress. Breath sounds: Normal breath sounds. No wheezing or rales. Abdominal:      General: Bowel sounds are normal.      Palpations: Abdomen is soft. Tenderness: There is no abdominal tenderness. Musculoskeletal:         General: Normal range of motion. Cervical back: Normal range of motion and neck supple. Lymphadenopathy:      Cervical: No cervical adenopathy. Skin:     General: Skin is warm and dry. Neurological:      Mental Status: She is alert and oriented to person, place, and time.    Psychiatric:         Mood and Affect: Mood normal.

## 2023-10-20 NOTE — PATIENT INSTRUCTIONS
Use Medrol as directed until completed  Use albuterol as directed as needed for symptom relief  Continue with home over-the-counter's as needed  Follow up with PCP in 3-5 days. Proceed to  ER if symptoms worsen. Cold Symptoms   AMBULATORY CARE:   Cold symptoms  include sneezing, dry throat, a stuffy nose, headache, watery eyes, and a cough. Your cough may be dry, or you may cough up mucus. You may also have muscle aches, joint pain, and tiredness. Rarely, you may have a fever. Cold symptoms occur from inflammation in your upper respiratory system caused by a virus. Most colds go away without treatment. Seek care immediately if:   You have increased tiredness and weakness. You are unable to eat. Your heart is beating much faster than usual for you. You see white spots in the back of your throat and your neck is swollen and sore to the touch. You see pinpoint or larger reddish-purple dots on your skin. Contact your healthcare provider if:   You have a fever higher than 102°F (38.9°C). You have new or worsening shortness of breath. You have thick nasal drainage for more than 2 days. Your symptoms do not improve or get worse within 5 days. You have questions or concerns about your condition or care. Treatment for cold symptoms  may include NSAIDS to decrease muscle aches and fever. Cold medicines may also be given to decrease coughing, nasal stuffiness, sneezing, and a runny nose. Manage your cold symptoms: The following may help relieve cold symptoms, such as a dry throat and congestion:  Gargle with mouthwash or warm salt water as directed. Suck on throat lozenges or hard candy. Use a cold or warm vaporizer or humidifier to ease your breathing. Rest for at least 2 days and then as needed to decrease tiredness and weakness. Use petroleum based jelly around your nostrils to decrease irritation from blowing your nose. Drink plenty of liquids.  Liquids will help thin and loosen thick mucus so you can cough it up. Liquids will also keep you hydrated. Ask your healthcare provider which liquids are best for you and how much to drink each day. Prevent the spread of germs  by washing your hands often. You can spread your cold germs to others for at least 3 days after your symptoms start. Do not share items, such as eating utensils. Cover your nose and mouth when you cough or sneeze using the crook of your elbow instead of your hands. Throw used tissues in the garbage. Do not smoke:  Smoking may worsen your symptoms and increase the length of time you feel sick. Talk with your healthcare provider if you need help to stop smoking. Follow up with your doctor as directed:  Write down your questions so you remember to ask them during your visits. © Copyright [employer] 2023 Information is for End User's use only and may not be sold, redistributed or otherwise used for commercial purposes. The above information is an  only. It is not intended as medical advice for individual conditions or treatments. Talk to your doctor, nurse or pharmacist before following any medical regimen to see if it is safe and effective for you.

## 2024-05-18 NOTE — PROGRESS NOTES
LMP: 2022  PMB:  SA: YES   HPV: Not on file   Birth control: NONE   Last pap: 2021 Negative   Last mammo: Not on file:  Last colonoscopy: Not on file  Last Dexa: Not on file  Family History:   PGF - bone cancer [facility]        NAME: Tom Sanchez is a 33 y.o. male  : 1991    MRN: [id number]  DATE: May 18, 2024  TIME: 2:05 PM    Assessment and Plan   Viral upper respiratory illness [J06.9]  1. Viral upper respiratory illness        2. Sore throat  POCT rapid strepA    Throat culture          Explained to patient that overall his presentation appears consistent with a viral syndrome possible evolving bronchitis.  I have a lower suspicion for strep we will wait and see what the throat culture shows if positive we will send in amoxicillin otherwise he may continue with DayQuil and NyQuil    The patient verbalized understanding of exam findings and treatment plan.   We engaged in the shared decision-making process and treatment options were   discussed at length with the patient.  All questions, concerns and  complaints were answered and addressed to the patient's satisfaction.    Patient Instructions   There are no Patient Instructions on file for this visit.    Follow up with PCP in 3-5 days.  Proceed to  ER if symptoms worsen.    If tests are performed, our office will contact you with results only if   changes need to made to the care plan discussed with you at the visit.   [facility]     Chief Complaint     Chief Complaint   Patient presents with   • Sore Throat     Sore throat started Thursday with post nasal drip. Felt feverish Thursday night and took nyquil. Sputum is multiple colors of dark yellowish green and brown. Taking dayquil and nyquil with last dose 45 minutes ago         History of Present Illness       HPI  Patient presents complaining of sore throat and cough that began this past Thursday. Reports subjective chills no confirmed fevers on Thursday night. Took nyquil which helped. Using chloraseptic spray as well. No ear ache, sinuses feel ok. Does have productive cough - dark green/brown. Slight tinge of blood at times. No SOB or CP. Just feels  warm, also taking dayquil seemed to help.    Review of Systems   Review of Systems  All other related systems reviewed and are negative except as noted in HPI    Current Medications       Current Outpatient Medications:   •  fluocinolone acetonide (DERMOTIC) 0.01 % otic oil, Please place 4 drops into each ear as needed for ear itching., Disp: 20 mL, Rfl: 0  •  omeprazole (PriLOSEC) 20 mg delayed release capsule, Take 1 capsule (20 mg total) by mouth daily before breakfast, Disp: 30 capsule, Rfl: 0  •  dicyclomine (BENTYL) 20 mg tablet, Take 1 tablet (20 mg total) by mouth every 8 (eight) hours as needed (abdominal pain) (Patient not taking: Reported on 5/18/2024), Disp: 30 tablet, Rfl: 0    Current Allergies     Allergies as of 05/18/2024   • (No Known Allergies)            The following portions of the patient's history were reviewed and updated as appropriate: allergies, current medications, past family history, past medical history, past social history, past surgical history and problem list.     Past Medical History:   Diagnosis Date   • Diverticulitis of colon    • GERD (gastroesophageal reflux disease)        History reviewed. No pertinent surgical history.    History reviewed. No pertinent family history.      Medications have been verified.        Objective   /72   Pulse 69   Temp (!) 97.1 °F (36.2 °C)   Resp 18   Wt 99.8 kg (220 lb)   SpO2 97%   BMI 33.45 kg/m²   No LMP for male patient.       Physical Exam     Physical Exam  Constitutional:       General: He is not in acute distress.     Appearance: He is well-developed.   HENT:      Head: Normocephalic and atraumatic.      Mouth/Throat:      Pharynx: Posterior oropharyngeal erythema present.   Eyes:      General: No scleral icterus.     Conjunctiva/sclera: Conjunctivae normal.   Cardiovascular:      Rate and Rhythm: Normal rate and regular rhythm.      Heart sounds: Normal heart sounds. No murmur heard.  Pulmonary:      Effort: Pulmonary  "effort is normal. No respiratory distress.      Breath sounds: No stridor. No wheezing, rhonchi or rales.   Musculoskeletal:      Cervical back: Normal range of motion and neck supple.   Skin:     General: Skin is warm and dry.   Neurological:      Mental Status: He is alert and oriented to person, place, and time.   Psychiatric:         Behavior: Behavior normal.         Ortho Exam        Procedures  No Procedures performed today        Note: Portions of this record may have been created with voice recognition software. Occasional wrong word or \"sound a like\" substitutions may have occurred due to the inherent limitations of voice recognition software. Please read the chart carefully and recognize, using context, where substitutions have occurred.*      "

## 2024-10-11 NOTE — PATIENT INSTRUCTIONS
Patient Education     Lowering Your Risk of Breast Cancer   About this topic   Breast cancer is a serious illness. Breast cancer is when abnormal cells grow and divide more quickly in your breast. These cells form a growth or tumor. The abnormal cells may enter nearby tissue and spread to other parts of the body. It is the type of cancer most often seen in women. Men can have breast cancer, but it is a rare condition.  General   Some things in your life may increase your risk of breast cancer. You may not be able to change some of these. Others you can control.  You are more likely to get breast cancer if you:  Have a mother, sister, or daughter who has had breast cancer  Have used hormones for menopause for more than 5 years  Have had radiation therapy to the breast or chest in the past  Are overweight or do not exercise  Had your first menstrual period before you were 11 years old  Went through menopause after age 55  Have never been pregnant or had your first child after age 35  Have had breast cancer before  Drink alcohol in any form  Have dense breasts  Are older in age  There is no certain way to prevent breast cancer. There are things you can do to lower your chances of having breast cancer.  Keep a healthy weight. Lose weight if you are overweight. Being overweight raises your chances of having breast cancer.  Eat a healthy diet to maintain a healthy weight, such as more fruits, vegetables, and lean cuts of meat. Decrease the amount of saturated fat in your diet.  Exercise. Being active helps you keep a healthy weight.  Limit your alcohol intake or do not drink alcohol. The more alcohol you drink, the higher your risk.  Do not smoke cigarettes. Smoking can increase your risk of many types of cancer.  Breastfeed your baby. This may help protect you. The longer you breastfeed, the more protection you have.  Talk with your doctor about:  Limiting or stopping hormone therapy.  Taking certain drugs to prevent  breast cancer. For women at high risk of having breast cancer, there are a few drugs that may lower your risk.  Surgery to prevent you from having breast cancer if you are very high risk.  When do I need to call the doctor?   Changes in your breasts  A lump or area in your breast that feels different  Discharge from your nipple  Skin on your breast is dimpled or indented  You have questions or concerns about your breasts  Helpful tips   Talk to your doctor about the best kind of breast cancer screening for you.  If you want to do self breast exams, have your doctor show you the right way to do them.  Tell your doctor of any abnormal finding.  Last Reviewed Date   2021-10-04  Consumer Information Use and Disclaimer   This generalized information is a limited summary of diagnosis, treatment, and/or medication information. It is not meant to be comprehensive and should be used as a tool to help the user understand and/or assess potential diagnostic and treatment options. It does NOT include all information about conditions, treatments, medications, side effects, or risks that may apply to a specific patient. It is not intended to be medical advice or a substitute for the medical advice, diagnosis, or treatment of a health care provider based on the health care provider's examination and assessment of a patient’s specific and unique circumstances. Patients must speak with a health care provider for complete information about their health, medical questions, and treatment options, including any risks or benefits regarding use of medications. This information does not endorse any treatments or medications as safe, effective, or approved for treating a specific patient. [employer] and its affiliates disclaim any warranty or liability relating to this information or the use thereof. The use of this information is governed by the Terms of Use, available at  https://www.woltersNeedcheckuwer.com/en/know/clinical-effectiveness-terms   Copyright   Copyright © 2024 [employer], [employer]. and its affiliates and/or licensors. All rights reserved.       Perineal Hygiene      Your vaginal naturally takes care of its self, it is a self washing system, the less you mess the healthier it will be     No soaps or feminine wash to the vulva, these products can cause dermitis, bacterial infections and other vulvar problems.   Use only water to cleanse, or water with Dove or Dove Sensitive Skin Bar soap if necessary.    Avoid the use of washcloths, exfoliating prerna's, netted scrubbers, loofa's, use your hands only to cleanse the vulvar tissues    No scented lotions or products are advised in or near your vulva.    Use coconut oil in its solid form for moisture if needed.  No douching this may cause imbalance in your vaginal PH and further issues.    If you wear panty liners, you may apply a thin coating of Vaseline, A&D ointment or coconut oil to the vulvar tissues as a skin barrier     Cotton underware, loose fitting clothing  Only perfume-free, dye-free laundry detergent, use a second rinse cycle   Avoid fabric softeners/dryer sheets.       Your partner should avoid the same products as well.       Over the counter probiotic to restore vaginal rosemary may be helpful as well, take daily.       You may also look into Boric Acid vaginal suppositories to restore vaginal PH balance for up to 2 weeks as directed on the box. You may not use these if you are pregnant      For vaginal dryness:      You may use:     Coconut oil in solid form, not liquid (organic, pure, unscented) as needed for moisture or lubrication. ( Do not use if allergic)       Replens moisture restore external comfort gel daily ( use as directed on the box)        Replens long lasting vaginal moisturizer  ( use as directed on the box)     May try Collplant vulvar moisturizer ( found on Amazon )    May try Revaree vaginal inserts        For  Vaginal Lubrication:          You may use:     Coconut oil in solid form, not liquid (organic, pure, unscented) as a lubricant or another scent-free lubricant (Astroglide, Uberlube) if needed.  Do not use coconut oil or silicone if using a condom as this may break down the integrity of the condom and cause an unplanned pregnancy              Do not use coconut oil if allergic               Replens silky smooth lubricant, premium silicone based lubricant for intercourse. ( use as directed, a small amount will provide an enhanced natural feeling)     Any premium over the counter vaginal lubricant water or silicone based. Silicone based will have more staying power.        For Vulvar irritation/itching:        You may use Hydrocortisone 1 % over the counter to external vulvar tissues 2 x daily for 5-7 days to help with irriation and itch relief.  Patient Education     Pelvic Floor Exercises   About this topic   The pelvic floor consists of muscles and strong bands of tissues which support all of the organs in your pelvis. Some of these organs are the bladder and the small and large bowel as well as the womb and the prostate. If the muscles and tissues get weak, your organs may drop. This can lead to other problems. Your urine may leak when you laugh, sneeze, or cough. You may not be able to drain the bladder fully. You may have less problems if you do exercises to strengthen your pelvic floor and abdominal muscles.  Kegel exercises help make the muscles in the pelvic floor stronger. Anyone can do them. It is also important to make your abdominal muscles stronger. In order for these exercises to work, you must be consistent when doing them.  General   Before starting with a program, ask your doctor if you are healthy enough to do these exercises. Your doctor may have you work with a  or physical therapist to make a safe exercise program to meet your needs.  Strengthening Exercises   Kegel exercises keep your  pelvic muscles firm and strong. You can do these in many different positions. Start by lying down with your knees bent and feet on the bed. Squeeze the pelvic muscles as if you are trying to stop the flow of urine. Hold these muscles for a count of 3, and then slowly relax them for a count of 3. Try to work up to squeezing for a count of 10 and slowly relaxing for a count of 10. Increase the muscle squeeze until you get to 10. When relaxing, slowly relax to a count of 10.  Breathe out when you are squeezing and breathe in when you are relaxing. Your goal is to try to do 10 Kegels 3 or more times each day. Take time to rest between sets. Be sure to only contract your pelvic floor muscles, not your buttocks, thighs, or abdominal muscles.  Pelvic floor contractions ? There are a few ways to feel the pelvic muscles contract.  When you are passing urine, try suddenly stopping your flow of urine. Do not do this on a regular basis, but only to feel what the contraction feels like. Doing this while passing urine can lead to other problems.  Put a finger into the vagina or rectum. Contract the muscles around your finger as if you were trying to stop the flow of urine or stop the passing of gas.  Place two chairs without arms about 2 inches (5 cm) apart. Sit so you have one butt cheek on each chair. Now, try blayne your pelvic floor muscles. This will help you keep from using other muscles.  Pelvic tilts ? Lie on your back with your knees bent and feet flat on the floor. Tighten your stomach muscles and press your lower back down to the floor. Try doing Kegels with this exercise when your back is flattened. Hold 3 to 5 seconds. Relax.  Straight leg raises lying down ? Lie on your back with one leg straight. Bend your other knee so the foot is flat on the bed. Keeping your leg straight, lift the leg up to the level of your other knee. Lower it back down. Repeat with the other leg.  Hip lifts ? Lie on your back with your  knees bent and feet flat on the floor. Tighten your stomach muscles and lift your buttocks off the floor. Try doing Kegels when up in this position. Hold 3 to 5 seconds. Relax.  Abdominal bracing ? Do this exercise in different positions: Lying down, sitting, and standing. Tighten your stomach muscles. While keeping the stomach muscles tight, tighten your pelvic floor muscles. Now, forcefully laugh or cough to see if you were able to prevent urine from leaking.  Abdominal crunches ? Lie on your back with both knees bent. Keep your feet flat on the floor. Place your hands in one of these positions. Try starting with the first position since it is the easiest. As you get better, use the other positions to make it harder.  Crunches with arms at sides.  Crunches with arms across chest.  Crunches with arms behind head. Be careful not to interlock your fingers behind your neck or head while doing crunches. This may add tension to your neck and cause strain.  Look at the ceiling. Tighten your belly muscles and lift your shoulders and upper back off the floor. Breathe out while you are doing this. Lower your shoulders to the floor. Breathe in while you are doing this. Relax your belly muscles all the way before starting another crunch.             What will the results be?   Less leakage of urine when you cough, sneeze, laugh, or run  Fewer strong urges to pass urine  Fewer trips to the bathroom each day  Less risk of organs, such as the uterus or bladder, dropping into the vagina  Faster recovery after childbirth or prostate surgery  Stronger core muscles  Increased sensitivity during sex  Helpful tips   You can also try doing a different kind of Kegels. Do 5 quick, strong pelvic floor contractions. Sometimes, if you have an urge to pass urine but are not near a bathroom, you can do this kind of Kegel to calm the urge.  Stay active and work out to keep your muscles strong and flexible.  Keep a healthy weight to avoid  putting too much stress on your spine. Eat a healthy diet to keep your muscles healthy.  Be sure you do not hold your breath when exercising. This can raise your blood pressure. If you tend to hold your breath, try counting out loud when exercising. If any exercise bothers you, stop right away.  Try walking or cycling at an easy pace for a few minutes to warm up your muscles. Do this again after exercising.  Doing exercises before a meal may be a good way to get into a routine. A good time to do these exercises is each time you are stopped at a stop light while driving.  Exercise may be slightly uncomfortable, but you should not have sharp pains. If you do get sharp pains, stop what you are doing. If the sharp pains continue, call your doctor.  Last Reviewed Date   2021-03-31  Consumer Information Use and Disclaimer   This generalized information is a limited summary of diagnosis, treatment, and/or medication information. It is not meant to be comprehensive and should be used as a tool to help the user understand and/or assess potential diagnostic and treatment options. It does NOT include all information about conditions, treatments, medications, side effects, or risks that may apply to a specific patient. It is not intended to be medical advice or a substitute for the medical advice, diagnosis, or treatment of a health care provider based on the health care provider's examination and assessment of a patient’s specific and unique circumstances. Patients must speak with a health care provider for complete information about their health, medical questions, and treatment options, including any risks or benefits regarding use of medications. This information does not endorse any treatments or medications as safe, effective, or approved for treating a specific patient. [employer], [employer] and its affiliates disclaim any warranty or liability relating to this information or the use thereof. The use of this information is  governed by the Terms of Use, available at https://www.NetTalonuwer.com/en/know/clinical-effectiveness-terms   Copyright   Copyright © 2024 [employer], [employer]. and its affiliates and/or licensors. All rights reserved.    Patient Education     HPV (Human Papillomavirus) Vaccine [facility] Vaccine Information Statement (VIS)   About this topic

## 2024-10-11 NOTE — PROGRESS NOTES
Diagnoses and all orders for this visit:    Encounter for annual routine gynecological examination  -     Liquid-based pap, screening        Perineal hygiene reviewed   Weight bearing exercises minium of 150 mins/weekly advised.   Kegel exercises recommended daily, see AVS for instructions and recommendations  SBE encouraged, ASCCP guidelines reviewed. Condoms encouraged with all sexual activity to prevent STI's.   Gardisil vaccines recommended up to age 45  Calcium/ Vit D dietary requirements discussed,   Advised to call with any issues,  all concerns & questions addressed.   See after visit summary for further information and recommendations to the above mentioned subjects which we may or may not have covered in detail during your visit     F/U Annually and PRN      Health Maintenance:    Last PAP: 2021 Negative   Next PAP Due:collect today     Last Mammogram: Not on file  advised age 40     Last Colonoscopy: Not on file    advised age 45    Gardisil: Not completed   Gardasil 9 was advised for prevention of HPV-related disease. We discussed risks/benefits, SE's/AE's at length and all questions were answered. Written info was provided for review. The patient agrees to consider and is aware she may call to schedule injection #1 at any time.    declined    Subjective    CC: Yearly Exam      Paris Campbell is a 29 y.o. female here for an annual exam.   GYN hx includes: 1 c section   Family hx of:  No GYN cancers  Medically stable, reports no changes in medical Hx, follows with PMD    Patient's last menstrual period was 2024 (exact date).  Her menstrual cycles are regular every 28-30 days. She denies issues with bleeding during her menses.   Denies history of abnormal pap smear.  She denies breast concerns, abnormal vaginal discharge, vaginal itching, odor, irritation, bowel/bladder dysfunction, urinary symptoms, pelvic pain today.   Mentions during urination she will occasionally feel a pelvic  cramp.  We discussed bladder spasms.  Could be related to scar tissue from .  Continue to observe if symptoms seem to worsen return to the office or follow-up with primary  She is sexually active. Monogamous relationship.    Her current method of contraception includes none. Denies any issues with her BCM.   She does not want STD testing today.  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    Past Medical History:   Diagnosis Date    Mononucleosis     Pleurisy     Varicella     disease     Past Surgical History:   Procedure Laterality Date     SECTION      [facility]  DELIVERY ONLY N/A 2022    Procedure:  SECTION ();  Surgeon: Michelle Gramajo MD;  Location: [facility] ;  Service: Obstetrics    WISDOM TOOTH EXTRACTION         Immunization History   Administered Date(s) Administered    Influenza, injectable, quadrivalent, preservative free 0.5 mL 2021    Tdap 03/15/2022, 03/15/2022       Family History   Problem Relation Age of Onset    No Known Problems Mother     Diabetes Father     Hypertension Father     No Known Problems Brother     No Known Problems Daughter     No Known Problems Maternal Grandmother     No Known Problems Maternal Grandfather     No Known Problems Paternal Grandmother     Bone cancer Paternal Grandfather      Social History     Tobacco Use    Smoking status: Former     Current packs/day: 0.00     Types: Cigarettes     Quit date: 9/15/2021     Years since quitting: 3.0    Smokeless tobacco: Never    Tobacco comments:     quit mid sept   Vaping Use    Vaping status: Never Used   Substance Use Topics    Alcohol use: Not Currently    Drug use: No     No current outpatient medications on file.  Patient Active Problem List    Diagnosis Date Noted    Personal history of COVID-19 2022    History of syncope 2021       No Known Allergies    OB History    Para Term  AB Living   1 1 1 0 0 1   SAB IAB  "Ectopic Multiple Live Births   0 0 0 0 1      # Outcome Date GA Lbr Stephen/2nd Weight Sex Type Anes PTL Lv   1 Term 22 38w4d  3425 g (7 lb 8.8 oz) F CS-LTranv Spinal  PONCE      Obstetric Comments   1         Vitals:    10/15/24 1550   BP: 124/76   BP Location: Left arm   Patient Position: Sitting   Cuff Size: Large   Weight: 98.9 kg (218 lb)   Height: 5' 5\" (1.651 m)     Body mass index is 36.28 kg/m².    Review of Systems     Constitutional: Negative for chills, fatigue, fever, headaches, visual disturbances, and unexpected weight change.   Respiratory: Negative for cough, & shortness of breath.  Cardiovascular: Negative for chest pain. .    Gastrointestinal: Negative for Abd pain, nausea & vomiting, constipation and diarrhea.   Genitourinary: Negative for difficulty urinating, dysuria, hematuria, unusual vaginal bleeding or discharge  Skin: Negative skin changes    Physical Exam     Constitutional: Alert & Oriented x3, well-developed and well-nourished. No distress.   HENT: Atraumatic, Normocephalic, Conjunctivae clear  Neck: Normal range of motion. Neck supple. No thyromegaly, mass, nodules or tenderness  Pulmonary: Effort normal.   Abdominal: Soft. No tenderness or masses  Musculoskeletal: Normal ROM  Skin: Warm & Dry  Psychological: Normal mood, thought content, behavior & judgement     Breasts:   Right: tissue soft without masses, tenderness, skin changes or nipple discharge. No areas of erythema or pain. No subclavicular, axillary, pectoral adenopathy  Left:  tissue soft without masses, tenderness, skin changes or nipple discharge. No areas of erythema or pain. No subclavicular, axillary, pectoral adenopathy    Pelvic exam was performed with patient supine, lithotomy position.      Labia: Negative rash, tenderness, lesion or injury on the right labia.              Negative rash, tenderness, lesion or injury on the left labia.   Urethral meatus:  Negative for  tenderness, inflammation or discharge. "   Uterus: not deviated, enlarged, fixed or tender.   Cervix: No CMT, no discharge or friability.   Right adnexa: no mass, no tenderness and no fullness.  Left adnexa: no mass, no tenderness and no fullness.   Vagina: No erythema, tenderness, masses, or foreign body in the vagina. No signs of injury around the vagina. No unusual vaginal discharge   Perineum without lesions, signs of injury, erythema or swelling.  Inguinal Canal:        Right: No inguinal adenopathy or hernia present.        Left: No inguinal adenopathy or hernia present.

## 2024-10-15 ENCOUNTER — ANNUAL EXAM (OUTPATIENT)
Dept: OBGYN CLINIC | Facility: CLINIC | Age: 29
End: 2024-10-15
Payer: COMMERCIAL

## 2024-10-15 VITALS
SYSTOLIC BLOOD PRESSURE: 124 MMHG | HEIGHT: 65 IN | WEIGHT: 218 LBS | DIASTOLIC BLOOD PRESSURE: 76 MMHG | BODY MASS INDEX: 36.32 KG/M2

## 2024-10-15 DIAGNOSIS — Z01.419 ENCOUNTER FOR ANNUAL ROUTINE GYNECOLOGICAL EXAMINATION: Primary | ICD-10-CM

## 2024-10-15 PROCEDURE — G0145 SCR C/V CYTO,THINLAYER,RESCR: HCPCS | Performed by: OBSTETRICS & GYNECOLOGY

## 2024-10-15 PROCEDURE — S0612 ANNUAL GYNECOLOGICAL EXAMINA: HCPCS | Performed by: OBSTETRICS & GYNECOLOGY

## 2024-10-23 LAB
LAB AP GYN PRIMARY INTERPRETATION: NORMAL
Lab: NORMAL

## 2025-01-08 ENCOUNTER — OFFICE VISIT (OUTPATIENT)
Dept: URGENT CARE | Facility: CLINIC | Age: 30
End: 2025-01-08
Payer: COMMERCIAL

## 2025-01-08 VITALS
DIASTOLIC BLOOD PRESSURE: 82 MMHG | OXYGEN SATURATION: 97 % | SYSTOLIC BLOOD PRESSURE: 116 MMHG | TEMPERATURE: 98.6 F | HEART RATE: 86 BPM | RESPIRATION RATE: 18 BRPM

## 2025-01-08 DIAGNOSIS — J02.9 SORE THROAT: ICD-10-CM

## 2025-01-08 DIAGNOSIS — J02.0 STREP PHARYNGITIS: Primary | ICD-10-CM

## 2025-01-08 LAB — S PYO AG THROAT QL: POSITIVE

## 2025-01-08 PROCEDURE — 87880 STREP A ASSAY W/OPTIC: CPT

## 2025-01-08 PROCEDURE — S9083 URGENT CARE CENTER GLOBAL: HCPCS

## 2025-01-08 PROCEDURE — G0382 LEV 3 HOSP TYPE B ED VISIT: HCPCS

## 2025-01-08 RX ORDER — AMOXICILLIN 500 MG/1
500 TABLET, FILM COATED ORAL 2 TIMES DAILY
Qty: 20 TABLET | Refills: 0 | Status: SHIPPED | OUTPATIENT
Start: 2025-01-08 | End: 2025-01-18

## 2025-01-08 NOTE — PATIENT INSTRUCTIONS
Take amoxicillin as prescribed  Replace toothbrush after 2-3 days of treatment  Fluids and rest  Salt water gargles and chloraseptic spray  Warm tea with honey  Wash hands frequently  Don't share drinks  Tylenol/Ibuprofen for pain/fever    Follow up with PCP in 3-5 days.  Proceed to the ER with worsening symptoms.     Strep Throat   AMBULATORY CARE:   Strep throat  is a throat infection caused by bacteria. It is easily spread from person to person.  Common symptoms include the following:   Sore, red, and swollen throat    Fever and headache     Upset stomach, abdominal pain, or vomiting    White or yellow patches or blisters in the back of your throat    Tender, swollen lumps on the sides of your neck or jaw    Throat pain when you swallow    Call 911 for any of the following:   You have trouble breathing.      Seek care immediately if:   You have new symptoms like a bad headache, stiff neck, chest pain, or vomiting.    You are drooling because you cannot swallow your spit.    Contact your healthcare provider if:   You have a fever.    You have a rash or ear pain.    You have green, yellow-brown, or bloody mucus when you cough or blow your nose.    You are unable to drink anything.    You have questions or concerns about your condition or care.    Treatment for strep throat  may include antibiotic medicine to treat your strep throat. You should feel better within 2 to 3 days after you start antibiotics. You may return to work or school 24 hours after you start antibiotics.  Manage strep throat:   Use lozenges, ice, soft foods, or popsicles  to soothe your throat.    Drink juice, milk shakes, or soup  if your throat is too sore to eat solid food. Drinking liquids can also help prevent dehydration.    Gargle with salt water.  Mix ¼ teaspoon salt in a glass of warm water and gargle. This may help reduce swelling in your throat.    Do not smoke.  Nicotine and other chemicals in cigarettes and cigars can cause lung  damage and make your symptoms worse. Ask your healthcare provider for information if you currently smoke and need help to quit. E-cigarettes or smokeless tobacco still contain nicotine. Talk to your healthcare provider before you use these products.    Prevent the spread of strep throat:   Wash your hands often.  Use soap and water. Wash your hands after you use the bathroom, change a child's diapers, or sneeze. Wash your hands before you prepare or eat food.     Do not share food or drinks.  Replace your toothbrush after you have taken antibiotics for 24 hours.    Follow up with your doctor as directed:  Write down your questions so you remember to ask them during your visits.   © Copyright [employer] 2022 Information is for End User's use only and may not be sold, redistributed or otherwise used for commercial purposes. All illustrations and images included in CareNotes® are the copyrighted property of [employer], [employer] or [employer]  The above information is an  only. It is not intended as medical advice for individual conditions or treatments. Talk to your doctor, nurse or pharmacist before following any medical regimen to see if it is safe and effective for you.

## 2025-01-08 NOTE — PROGRESS NOTES
[facility]        NAME: Paris Campbell is a 29 y.o. female  : 1995    MRN: [id number]  DATE: 2025  TIME: 10:54 AM    Assessment and Plan   Strep pharyngitis [J02.0]  1. Strep pharyngitis  amoxicillin (AMOXIL) 500 MG tablet      2. Sore throat  POCT rapid ANTIGEN strepA        Rapid strep positive.     Patient Instructions     Take amoxicillin as prescribed  Replace toothbrush after 2-3 days of treatment  Fluids and rest  Salt water gargles and chloraseptic spray  Warm tea with honey  Wash hands frequently  Don't share drinks  Tylenol/Ibuprofen for pain/fever    Follow up with PCP in 3-5 days.  Proceed to the ER with worsening symptoms.     Chief Complaint     Chief Complaint   Patient presents with    Sore Throat     C.o sore throat that started on the .  States resolved and turned into cough and congestion. But today sore throat returned. Taking all in one otc cold medication.          History of Present Illness       The patient presents today with complaints of sore throat that started on 24. That resolved and then started with nasal congestion and slight cough. This morning her sore throat returned and is also having radiating pain into her ears. Has been taking OTC mucinex and dayquil with minimal relief.         Review of Systems   Review of Systems   Constitutional:  Negative for chills and fever.   HENT:  Positive for congestion, ear pain and sore throat. Negative for postnasal drip, rhinorrhea, sinus pressure and sinus pain.    Respiratory:  Positive for cough. Negative for chest tightness, shortness of breath and wheezing.    Gastrointestinal:  Negative for abdominal pain, diarrhea, nausea and vomiting.   Musculoskeletal:  Negative for myalgias.   Skin:  Negative for rash.   Hematological:  Positive for adenopathy.         Current Medications       Current Outpatient Medications:     amoxicillin (AMOXIL) 500 MG tablet, Take 1 tablet (500 mg total) by mouth 2  (two) times a day for 10 days, Disp: 20 tablet, Rfl: 0    Current Allergies     Allergies as of 2025    (No Known Allergies)            The following portions of the patient's history were reviewed and updated as appropriate: allergies, current medications, past family history, past medical history, past social history, past surgical history and problem list.     Past Medical History:   Diagnosis Date    Mononucleosis     Pleurisy     Varicella     disease       Past Surgical History:   Procedure Laterality Date     SECTION      [place]  DELIVERY ONLY N/A 2022    Procedure:  SECTION ();  Surgeon: Michelle Gramajo MD;  Location: [facility] ;  Service: Obstetrics    WISDOM TOOTH EXTRACTION         Family History   Problem Relation Age of Onset    No Known Problems Mother     Diabetes Father     Hypertension Father     No Known Problems Brother     No Known Problems Daughter     No Known Problems Maternal Grandmother     No Known Problems Maternal Grandfather     No Known Problems Paternal Grandmother     Bone cancer Paternal Grandfather          Medications have been verified.        Objective   /82   Pulse 86   Temp 98.6 °F (37 °C)   Resp 18   SpO2 97%        Physical Exam     Physical Exam  Vitals and nursing note reviewed.   Constitutional:       General: She is not in acute distress.     Appearance: Normal appearance. She is not ill-appearing.   HENT:      Head: Normocephalic and atraumatic.      Right Ear: Tympanic membrane, ear canal and external ear normal.      Left Ear: Tympanic membrane, ear canal and external ear normal.      Nose: Congestion present. No rhinorrhea.      Mouth/Throat:      Lips: Pink.      Mouth: Mucous membranes are moist.      Pharynx: Posterior oropharyngeal erythema present. No oropharyngeal exudate.      Tonsils: No tonsillar exudate.   Eyes:      General: Vision grossly intact.      Extraocular Movements: Extraocular movements intact.       Pupils: Pupils are equal, round, and reactive to light.   Cardiovascular:      Rate and Rhythm: Normal rate and regular rhythm.      Heart sounds: Normal heart sounds. No murmur heard.  Pulmonary:      Effort: Pulmonary effort is normal. No respiratory distress.      Breath sounds: Normal breath sounds. No decreased air movement. No decreased breath sounds, wheezing, rhonchi or rales.   Musculoskeletal:         General: Normal range of motion.      Cervical back: Normal range of motion.   Lymphadenopathy:      Cervical: Cervical adenopathy present.   Skin:     General: Skin is warm.      Findings: No rash.   Neurological:      Mental Status: She is alert and oriented to person, place, and time.      Motor: Motor function is intact.      Gait: Gait is intact.   Psychiatric:         Attention and Perception: Attention normal.         Mood and Affect: Mood normal.

## (undated) DEVICE — SKIN MARKER DUAL TIP WITH RULER CAP, FLEXIBLE RULER AND LABELS: Brand: DEVON

## (undated) DEVICE — SUT PLAIN 3-0 CT-1 27 IN 842H

## (undated) DEVICE — SUT VICRYL 0 CTX 36 IN J978H

## (undated) DEVICE — TELFA NON-ADHERENT ABSORBENT DRESSING: Brand: TELFA

## (undated) DEVICE — PACK C-SECTION PBDS

## (undated) DEVICE — GAUZE SPONGES,16 PLY: Brand: CURITY

## (undated) DEVICE — GLOVE PI ULTRA TOUCH SZ.7.0

## (undated) DEVICE — SUT VICRYL 0 CT-1 27 IN J260H

## (undated) DEVICE — GAUZE SPONGES,USP TYPE VII GAUZE, 12 PLY: Brand: CURITY

## (undated) DEVICE — SUT MONOCRYL 4-0 PS-2 27 IN Y426H

## (undated) DEVICE — SUT VICRYL 2-0 CT-1 36 IN VR945

## (undated) DEVICE — ABDOMINAL PAD: Brand: DERMACEA

## (undated) DEVICE — CHLORAPREP HI-LITE 10.5ML ORANGE

## (undated) DEVICE — Device